# Patient Record
Sex: FEMALE | Race: WHITE | NOT HISPANIC OR LATINO | Employment: FULL TIME | URBAN - METROPOLITAN AREA
[De-identification: names, ages, dates, MRNs, and addresses within clinical notes are randomized per-mention and may not be internally consistent; named-entity substitution may affect disease eponyms.]

---

## 2017-07-19 ENCOUNTER — ALLSCRIPTS OFFICE VISIT (OUTPATIENT)
Dept: OTHER | Facility: OTHER | Age: 42
End: 2017-07-19

## 2017-07-19 DIAGNOSIS — Z12.31 ENCOUNTER FOR SCREENING MAMMOGRAM FOR MALIGNANT NEOPLASM OF BREAST: ICD-10-CM

## 2017-07-19 DIAGNOSIS — K21.9 GASTRO-ESOPHAGEAL REFLUX DISEASE WITHOUT ESOPHAGITIS: ICD-10-CM

## 2017-07-19 DIAGNOSIS — R53.83 OTHER FATIGUE: ICD-10-CM

## 2017-07-19 DIAGNOSIS — Z13.220 ENCOUNTER FOR SCREENING FOR LIPOID DISORDERS: ICD-10-CM

## 2017-07-19 DIAGNOSIS — Z00.00 ENCOUNTER FOR GENERAL ADULT MEDICAL EXAMINATION WITHOUT ABNORMAL FINDINGS: ICD-10-CM

## 2017-07-19 LAB
A/G RATIO (HISTORICAL): 1.9 (CALC) (ref 1–2.5)
ALBUMIN SERPL BCP-MCNC: 4.3 G/DL (ref 3.6–5.1)
ALP SERPL-CCNC: 54 U/L (ref 33–115)
ALT SERPL W P-5'-P-CCNC: 9 U/L (ref 6–29)
AST SERPL W P-5'-P-CCNC: 12 U/L (ref 10–30)
BASOPHILS # BLD AUTO: 1.3 %
BASOPHILS # BLD AUTO: 81 CELLS/UL (ref 0–200)
BILIRUB SERPL-MCNC: 0.6 MG/DL (ref 0.2–1.2)
BUN SERPL-MCNC: 13 MG/DL (ref 7–25)
BUN/CREA RATIO (HISTORICAL): NORMAL (CALC) (ref 6–22)
CALCIUM SERPL-MCNC: 9.8 MG/DL (ref 8.6–10.2)
CHLORIDE SERPL-SCNC: 104 MMOL/L (ref 98–110)
CHOLEST SERPL-MCNC: 226 MG/DL (ref 125–200)
CHOLEST/HDLC SERPL: 2.7 (CALC)
CO2 SERPL-SCNC: 27 MMOL/L (ref 20–31)
CREAT SERPL-MCNC: 0.89 MG/DL (ref 0.5–1.1)
DEPRECATED RDW RBC AUTO: 12 % (ref 11–15)
EGFR AFRICAN AMERICAN (HISTORICAL): 91 ML/MIN/1.73M2
EGFR-AMERICAN CALC (HISTORICAL): 79 ML/MIN/1.73M2
EOSINOPHIL # BLD AUTO: 353 CELLS/UL (ref 15–500)
EOSINOPHIL # BLD AUTO: 5.7 %
GAMMA GLOBULIN (HISTORICAL): 2.3 G/DL (CALC) (ref 1.9–3.7)
GLUCOSE (HISTORICAL): 83 MG/DL (ref 65–99)
HCT VFR BLD AUTO: 41 % (ref 35–45)
HDLC SERPL-MCNC: 85 MG/DL
HGB BLD-MCNC: 13.6 G/DL (ref 11.7–15.5)
LDL CHOLESTEROL (HISTORICAL): 120 MG/DL (CALC)
LYMPHOCYTES # BLD AUTO: 2375 CELLS/UL (ref 850–3900)
LYMPHOCYTES # BLD AUTO: 38.3 %
MCH RBC QN AUTO: 31.4 PG (ref 27–33)
MCHC RBC AUTO-ENTMCNC: 33.2 G/DL (ref 32–36)
MCV RBC AUTO: 94.7 FL (ref 80–100)
MONOCYTES # BLD AUTO: 440 CELLS/UL (ref 200–950)
MONOCYTES (HISTORICAL): 7.1 %
NEUTROPHILS # BLD AUTO: 2951 CELLS/UL (ref 1500–7800)
NEUTROPHILS # BLD AUTO: 47.6 %
NON-HDL-CHOL (CHOL-HDL) (HISTORICAL): 141 MG/DL (CALC)
PLATELET # BLD AUTO: 306 THOUSAND/UL (ref 140–400)
PMV BLD AUTO: 9.7 FL (ref 7.5–12.5)
POTASSIUM SERPL-SCNC: 4.4 MMOL/L (ref 3.5–5.3)
RBC # BLD AUTO: 4.33 MILLION/UL (ref 3.8–5.1)
SODIUM SERPL-SCNC: 139 MMOL/L (ref 135–146)
TOTAL PROTEIN (HISTORICAL): 6.6 G/DL (ref 6.1–8.1)
TRIGL SERPL-MCNC: 104 MG/DL
WBC # BLD AUTO: 6.2 THOUSAND/UL (ref 3.8–10.8)

## 2017-07-20 ENCOUNTER — LAB CONVERSION - ENCOUNTER (OUTPATIENT)
Dept: OTHER | Facility: OTHER | Age: 42
End: 2017-07-20

## 2017-07-20 LAB — TSH SERPL DL<=0.05 MIU/L-ACNC: 0.92 MIU/L

## 2017-08-01 ENCOUNTER — ALLSCRIPTS OFFICE VISIT (OUTPATIENT)
Dept: OTHER | Facility: OTHER | Age: 42
End: 2017-08-01

## 2017-08-09 ENCOUNTER — LAB CONVERSION - ENCOUNTER (OUTPATIENT)
Dept: OTHER | Facility: OTHER | Age: 42
End: 2017-08-09

## 2017-08-09 LAB
ADDITIONAL INFORMATION (HISTORICAL): NORMAL
ADEQUACY: (HISTORICAL): NORMAL
COMMENT (HISTORICAL): NORMAL
CYTOTECHNOLOGIST: (HISTORICAL): NORMAL
HPV MRNA E6/E7 (HISTORICAL): NOT DETECTED
INTERPRETATION (HISTORICAL): NORMAL
LMP (HISTORICAL): NORMAL
PREV. BX: (HISTORICAL): NORMAL
PREV. PAP (HISTORICAL): NORMAL
SOURCE (HISTORICAL): NORMAL

## 2017-10-04 ENCOUNTER — HOSPITAL ENCOUNTER (OUTPATIENT)
Dept: RADIOLOGY | Age: 42
Discharge: HOME | End: 2017-10-04
Payer: COMMERCIAL

## 2017-10-04 DIAGNOSIS — Z12.31 ENCOUNTER FOR SCREENING MAMMOGRAM FOR MALIGNANT NEOPLASM OF BREAST: ICD-10-CM

## 2017-10-04 PROCEDURE — G0202 SCR MAMMO BI INCL CAD: HCPCS

## 2017-10-04 PROCEDURE — 77063 BREAST TOMOSYNTHESIS BI: CPT

## 2018-01-12 VITALS
BODY MASS INDEX: 22.71 KG/M2 | DIASTOLIC BLOOD PRESSURE: 70 MMHG | WEIGHT: 133 LBS | HEIGHT: 64 IN | SYSTOLIC BLOOD PRESSURE: 110 MMHG

## 2018-01-16 NOTE — PROGRESS NOTES
Assessment    1  Gastroesophageal reflux disease (530 81) (K21 9)   2  Encounter to establish care (V65 8) (Z76 89)   3  Encounter for preventive health examination (V70 0) (Z00 00)    Plan   Fatigue, Gastroesophageal reflux disease    · (1) TSH WITH FT4 REFLEX; Status:Active; Requested for:77Kpp9117;   Gastroesophageal reflux disease    · Pantoprazole Sodium 40 MG Oral Tablet Delayed Release (Protonix); TAKE 1  TABLET DAILY  Health Maintenance    · Begin or continue regular aerobic exercise  Gradually work up to at least 3 sessions of 30  minutes of exercise a week ; Status:Complete;   Done: 19TNP9432   · Eat a low fat and low cholesterol diet ; Status:Complete;   Done: 32VWC7924    (1) LIPID PANEL, FASTING; Status:Resulted - Requires Verification;   Done: 45HSY6065 12:00AM  Due:96Bqx1954; Ordered;    For:Health Maintenance, Gastroesophageal reflux disease, Lipid screening; Ordered By:Sushma Martinez;   (1) COMPREHENSIVE METABOLIC PANEL; Status:Resulted - Requires Verification;   Done: 08SMT5708 12:00AM  Due:85Lsn9954; Ordered; For:Gastroesophageal reflux disease; Ordered By:Sushma Martinez;   (1) CBC/PLT/DIFF; Status:Resulted - Requires Verification;   Done: 05KTR1160 12:00AM  Due:23Mih5877; Ordered; For:Gastroesophageal reflux disease; Ordered By:Sushma Martinez;      Discussion/Summary  health maintenance visit Currently, she eats a healthy diet and has an adequate exercise regimen  cervical cancer screening is current Breast cancer screening: mammogram is current  Colorectal cancer screening: colorectal cancer screening is not indicated  Osteoporosis screening: bone mineral density testing is not indicated  Screening lab work includes hemoglobin, glucose, lipid profile and thyroid function testing  The TdaP 2013 with last pregnancy, due 2023 Td  Advice and education were given regarding nutrition, aerobic exercise and weight bearing exercise  Patient discussion: discussed with the patient       HM see above  GERD: d/c otc prilosec prn use and trial pantoprazole 40 mg po q day for 8-12 weeks then plan to wean  Education: reflux, DDX, diet, TLC and meds  Follow up 6-12 weeks  Possible side effects of new medications were reviewed with the patient/guardian today  The treatment plan was reviewed with the patient/guardian  The patient/guardian understands and agrees with the treatment plan      Chief Complaint  here to establish PCP and HM physical      History of Present Illness  HM, Adult Female: The patient is being seen for a health maintenance evaluation  The last health maintenance visit was >2 year(s) ago  Social History: Household members include spouse, 1 daughter(s) and 2 son(s)  She is   Work status: working part-time and [employer] [facility] [address]  The patient is a former cigarette smoker  She quit age 25  She reports drinking 7 drinks per week  The patient has no concerns about alcohol abuse  She has never used illicit drugs  General Health:   Reproductive health: the patient is premenopausal   she reports abnormal menses  Menstrual history: Menstrual Problems: dysmenorrhea, primary  she is sexually active  pregnancy history: G 3P 3, 3  Screening: Cervical cancer screening includes a pap smear performed 2016  Breast cancer screening includes a mammogram performed 2016  She hasn't been previously screened for colorectal cancer  HPI: HM see above  c/o reflux for past month, occurring several times per/week  OFten occurs after breakfast with symptoms of fullness gastric and throat symptoms  Using otc prilosec with improvement  non smoker  Denies wt loss/abdominal pain, change in stool/bloody stool, difficulty swallowing  Review of Systems    Constitutional: no fever, not feeling poorly, no chills and not feeling tired  Eyes: no eye pain, no eyesight problems, eyes not red and no purulent discharge from the eyes  ENT: no hearing loss and no nasal discharge     Cardiovascular: no chest pain, no intermittent leg claudication, no palpitations and no lower extremity edema  Respiratory: no shortness of breath, no cough and no wheezing  Gastrointestinal: no abdominal pain, no nausea, no constipation and no diarrhea  Musculoskeletal: no arthralgias, no limb pain and no myalgias  Integumentary: no rashes and no itching  Neurological: no headache, no numbness, no tingling, no dizziness and no fainting  Psychiatric: no anxiety, no sleep disturbances and no depression  Endocrine: no muscle weakness  Hematologic/Lymphatic: no swollen glands and no tendency for easy bleeding  Active Problems    1  Contraception (V25 9) (Z30 9)   2  Encounter to establish care (V65 8) (Z76 89)   3  Fatigue (780 79) (R53 83)   4  Gastroesophageal reflux disease (530 81) (K21 9)   5  Lipid screening (V77 91) (Z13 220)    Past Medical History    · History of Screening for colon cancer (V76 51) (Z12 11)    Surgical History    · History of Cervix Cryosurgery   · History of Cholecystectomy   · History of Oral Surgery Tooth Extraction [facility]    Family History  Mother    · Family history of malignant neoplasm (V16 9) (Z80 9)   · Family history of neuropathy (V17 2) (Z82 0)   · Family history of seizure disorder (V17 2) (Z82 0)  Father    · Family history of atrial fibrillation (V17 49) (Z82 49)   · Family history of cerebrovascular accident (CVA) (V17 1) (Z82 3)  Sister    · Family history of thyroid disease (V18 19) (Z83 49)    Social History    · Does not use illicit drugs (G91 90) (Z78 9)   · Employed   · Former smoker (V15 82) (Y65 839)   · Social alcohol use (Z78 9)    Current Meds   1  Tri-Sprintec 0 18/0 215/0 25 MG-35 MCG Oral Tablet; Take 1 tablet daily; Therapy: 19Apr2017 to (Last Rx:19Apr2017)  Requested for: 19Apr2017 Ordered    Allergies    1   No Known Drug Allergies    Vitals   Recorded: 59VZU0581 10:15AM   Temperature 98 5 F   Heart Rate 76   Respiration 18   Systolic 227 Diastolic 80   Height 5 ft 3 5 in   Weight 131 lb    BMI Calculated 22 84   BSA Calculated 1 62     Physical Exam    Constitutional   General appearance: No acute distress, well appearing and well nourished  Head and Face   Head and face: Normal     Palpation of the face and sinuses: No sinus tenderness  Eyes   Conjunctiva and lids: No swelling, erythema or discharge  Pupils and irises: Equal, round, reactive to light  Ears, Nose, Mouth, and Throat   External inspection of ears and nose: Normal     Otoscopic examination: Tympanic membranes translucent with normal light reflex  Canals patent without erythema  Hearing: Normal     Nasal mucosa, septum, and turbinates: Normal without edema or erythema  Lips, teeth, and gums: Normal, good dentition  Oropharynx: Normal with no erythema, edema, exudate or lesions  Neck   Neck: Supple, symmetric, trachea midline, no masses  Thyroid: Normal, no thyromegaly  Pulmonary   Respiratory effort: No increased work of breathing or signs of respiratory distress  Auscultation of lungs: Clear to auscultation  Cardiovascular   Palpation of heart: Normal PMI, no thrills  Auscultation of heart: Normal rate and rhythm, normal S1 and S2, no murmurs  Pedal pulses: 2+ bilaterally  Peripheral vascular exam: Normal     Examination of extremities for edema and/or varicosities: Normal     Abdomen   Abdomen: Non-tender, no masses  Liver and spleen: No hepatomegaly or splenomegaly  Lymphatic   Palpation of lymph nodes in neck: No lymphadenopathy  Musculoskeletal   Gait and station: Normal     Digits and nails: Normal without clubbing or cyanosis  Joints, bones, and muscles: Normal     Range of motion: Normal     Stability: Normal     Muscle strength/tone: Normal     Skin   Skin and subcutaneous tissue: Normal without rashes or lesions  Neurologic   Cortical function: Normal mental status  Reflexes: 2+ and symmetric      Sensation: No sensory loss  Psychiatric   Orientation to person, place, and time: Normal     Mood and affect: Normal        Results/Data  PHQ-2 Adult Depression Screening 20LAU8199 02:14PM User, Ahs     Test Name Result Flag Reference   PHQ-2 Adult Depression Score 0     Over the last two weeks, how often have you been bothered by any of the following problems? Little interest or pleasure in doing things: Not at all - 0  Feeling down, depressed, or hopeless: Not at all - 0   PHQ-2 Adult Depression Screening Negative         Attending Note  Collaborating Physician Note: Collaborating Physician: I discussed the case with the Advanced Practitioner and reviewed the note, I supervised the Advanced Practitioner and I agree with the Advanced Practitioner note        Future Appointments    Date/Time Provider Specialty Site   08/01/2017 02:30 PM Zach Crespo DO Obstetrics/Gynecology [facility]     Signatures   Electronically signed by : Meenu Britton ; Jul 23 2017  5:34PM EST                       (Author)    Electronically signed by : ISMA Walker ; Jul 23 2017  7:59PM EST                       (Author)

## 2018-01-22 VITALS
RESPIRATION RATE: 18 BRPM | DIASTOLIC BLOOD PRESSURE: 80 MMHG | BODY MASS INDEX: 22.36 KG/M2 | WEIGHT: 131 LBS | HEIGHT: 64 IN | HEART RATE: 76 BPM | TEMPERATURE: 98.5 F | SYSTOLIC BLOOD PRESSURE: 110 MMHG

## 2018-08-08 ENCOUNTER — ANNUAL EXAM (OUTPATIENT)
Dept: OBGYN CLINIC | Facility: CLINIC | Age: 43
End: 2018-08-08
Payer: COMMERCIAL

## 2018-08-08 ENCOUNTER — TELEPHONE (OUTPATIENT)
Dept: OBGYN CLINIC | Facility: CLINIC | Age: 43
End: 2018-08-08

## 2018-08-08 VITALS
BODY MASS INDEX: 23.25 KG/M2 | HEIGHT: 64 IN | WEIGHT: 136.2 LBS | DIASTOLIC BLOOD PRESSURE: 76 MMHG | SYSTOLIC BLOOD PRESSURE: 114 MMHG

## 2018-08-08 DIAGNOSIS — Z12.39 BREAST CANCER SCREENING: ICD-10-CM

## 2018-08-08 DIAGNOSIS — Z30.41 ENCOUNTER FOR SURVEILLANCE OF CONTRACEPTIVE PILLS: ICD-10-CM

## 2018-08-08 DIAGNOSIS — Z01.419 ENCOUNTER FOR ANNUAL ROUTINE GYNECOLOGICAL EXAMINATION: Primary | ICD-10-CM

## 2018-08-08 PROCEDURE — S0612 ANNUAL GYNECOLOGICAL EXAMINA: HCPCS | Performed by: OBSTETRICS & GYNECOLOGY

## 2018-08-08 RX ORDER — NORGESTIMATE AND ETHINYL ESTRADIOL 7DAYSX3 28
1 KIT ORAL DAILY
Qty: 28 TABLET | Refills: 11 | Status: SHIPPED | OUTPATIENT
Start: 2018-08-08 | End: 2019-07-10 | Stop reason: SDUPTHER

## 2018-08-08 RX ORDER — TRIAMCINOLONE ACETONIDE 1 MG/G
CREAM TOPICAL 2 TIMES DAILY
Qty: 30 G | Refills: 0 | Status: SHIPPED | OUTPATIENT
Start: 2018-08-08 | End: 2018-08-08 | Stop reason: CLARIF

## 2018-08-08 RX ORDER — NORGESTIMATE AND ETHINYL ESTRADIOL 7DAYSX3 28
KIT ORAL
COMMUNITY
Start: 2018-07-11 | End: 2018-08-08 | Stop reason: CLARIF

## 2018-08-08 NOTE — PROGRESS NOTES
Assessment/Plan:    Pap smear deferred due to low risk status  Encouraged self-breast examination as well as calcium supplementation  Continue annual mammogram, discussed 3D mammography  She will check see if this is covered by her insurance  Continue Ortho-Tri-Cyclen x1 year  Return to office in 1 year  She is instructed to keep a menstrual diary and call if her cycles are less than 21 day cycle  No problem-specific Assessment & Plan notes found for this encounter  Diagnoses and all orders for this visit:    Encounter for annual routine gynecological examination    Breast cancer screening    Encounter for surveillance of contraceptive pills  -     norgestimate-ethinyl estradiol (ORTHO TRI-CYCLEN,TRINESSA) 0 18/0 215/0 25 MG-35 MCG per tablet; Take 1 tablet by mouth daily    Other orders  -     Cancel: Mammo screening bilateral w 3d & cad; Future  -     Discontinue: TRI-LINYAH 0 18/0 215/0 25 MG-35 MCG per tablet;   -     Discontinue: triamcinolone (KENALOG) 0 1 % cream; Apply topically 2 (two) times a day          Subjective:      Patient ID: Merle Jerez is a 37 y o  female  HPI     This is a 22-year-old female  ( x3, age 21, 10, 4) presents for her annual gyn exam   She states her menstrual cycles are fairly regular every 4 weeks lasting 3-4 days  She does state that she had breakthrough bleeding on her last cycle  She denies any hot flashes or night sweats  She does diet exercise regularly however has noticed a 5 lb weight gain in the course of the year  She denies any changes in bowel or bladder function  She is sexually active and has been in a monogamous relationship for over 9 years  All her Pap smears have been normal   Last Pap smear 2017 within normal limits      The following portions of the patient's history were reviewed and updated as appropriate: allergies, current medications, past family history, past medical history, past social history, past surgical history and problem list     Review of Systems   Constitutional: Negative for fatigue, fever and unexpected weight change  Respiratory: Negative for cough, chest tightness, shortness of breath and wheezing  Cardiovascular: Negative  Negative for chest pain and palpitations  Gastrointestinal: Negative  Negative for abdominal distention, abdominal pain, blood in stool, constipation, diarrhea, nausea and vomiting  Genitourinary: Negative  Negative for difficulty urinating, dyspareunia, dysuria, flank pain, frequency, genital sores, hematuria, pelvic pain, urgency, vaginal bleeding, vaginal discharge and vaginal pain  Skin: Negative for rash  Objective:      /76   Ht 5' 3 5" (1 613 m)   Wt 61 8 kg (136 lb 3 2 oz)   LMP 07/08/2018 (Approximate)   Breastfeeding? No   BMI 23 75 kg/m²          Physical Exam   Constitutional: She appears well-developed and well-nourished  Cardiovascular: Normal rate and regular rhythm  Pulmonary/Chest: Effort normal and breath sounds normal  Right breast exhibits no inverted nipple, no mass, no nipple discharge, no skin change and no tenderness  Left breast exhibits no inverted nipple, no mass, no nipple discharge, no skin change and no tenderness  Abdominal: Soft  Bowel sounds are normal  She exhibits no distension  There is no tenderness  There is no rebound and no guarding  Genitourinary: Vagina normal and uterus normal  There is no lesion on the right labia  There is no lesion on the left labia  Cervix exhibits no discharge and no friability  Right adnexum displays no mass, no tenderness and no fullness  Left adnexum displays no mass, no tenderness and no fullness  No vaginal discharge found

## 2018-10-10 ENCOUNTER — HOSPITAL ENCOUNTER (OUTPATIENT)
Dept: RADIOLOGY | Age: 43
Discharge: HOME | End: 2018-10-10
Payer: COMMERCIAL

## 2018-10-10 ENCOUNTER — TRANSCRIBE ORDERS (OUTPATIENT)
Dept: ADMINISTRATIVE | Age: 43
End: 2018-10-10

## 2018-10-10 DIAGNOSIS — Z12.39 SCREENING FOR BREAST CANCER: Primary | ICD-10-CM

## 2018-10-10 DIAGNOSIS — Z12.39 SCREENING FOR BREAST CANCER: ICD-10-CM

## 2018-10-10 PROCEDURE — 77067 SCR MAMMO BI INCL CAD: CPT

## 2018-10-10 PROCEDURE — 77063 BREAST TOMOSYNTHESIS BI: CPT

## 2019-07-10 DIAGNOSIS — Z30.41 ENCOUNTER FOR SURVEILLANCE OF CONTRACEPTIVE PILLS: ICD-10-CM

## 2019-07-10 RX ORDER — NORGESTIMATE AND ETHINYL ESTRADIOL 7DAYSX3 28
1 KIT ORAL DAILY
Qty: 28 TABLET | Refills: 1 | Status: SHIPPED | OUTPATIENT
Start: 2019-07-10 | End: 2019-08-20 | Stop reason: SDUPTHER

## 2019-07-10 NOTE — TELEPHONE ENCOUNTER
rec'd rf req for ortho triCyclen from [facility]) - yearly due 8/2019 - called pt & sched yearly appt    Please sign off on presc to [facility])

## 2019-08-20 ENCOUNTER — ANNUAL EXAM (OUTPATIENT)
Dept: OBGYN CLINIC | Facility: CLINIC | Age: 44
End: 2019-08-20
Payer: COMMERCIAL

## 2019-08-20 VITALS
BODY MASS INDEX: 23.22 KG/M2 | HEIGHT: 64 IN | DIASTOLIC BLOOD PRESSURE: 62 MMHG | SYSTOLIC BLOOD PRESSURE: 114 MMHG | WEIGHT: 136 LBS

## 2019-08-20 DIAGNOSIS — Z30.41 ENCOUNTER FOR SURVEILLANCE OF CONTRACEPTIVE PILLS: ICD-10-CM

## 2019-08-20 DIAGNOSIS — Z01.419 ENCOUNTER FOR ANNUAL ROUTINE GYNECOLOGICAL EXAMINATION: Primary | ICD-10-CM

## 2019-08-20 DIAGNOSIS — Z12.39 BREAST CANCER SCREENING: ICD-10-CM

## 2019-08-20 PROCEDURE — S0612 ANNUAL GYNECOLOGICAL EXAMINA: HCPCS | Performed by: OBSTETRICS & GYNECOLOGY

## 2019-08-20 RX ORDER — NORGESTIMATE AND ETHINYL ESTRADIOL 7DAYSX3 28
1 KIT ORAL DAILY
Qty: 28 TABLET | Refills: 11 | Status: SHIPPED | OUTPATIENT
Start: 2019-08-20 | End: 2020-08-11 | Stop reason: SDUPTHER

## 2019-08-20 NOTE — PROGRESS NOTES
Assessment/Plan:  Pap smear done as well as annual   Encouraged self-breast examination as well as calcium supplementation  Continue annual mammogram   Continue Ortho-Tri-Cyclen x1 year  Return to office in 1 year or p r n  No problem-specific Assessment & Plan notes found for this encounter  Diagnoses and all orders for this visit:    Encounter for annual routine gynecological examination  -     Thinprep Tis and HPV mRNA E6/E7    Breast cancer screening  -     Mammo screening bilateral w 3d & cad; Future    Encounter for surveillance of contraceptive pills  -     norgestimate-ethinyl estradiol (ORTHO TRI-CYCLEN,TRINESSA) 0 18/0 215/0 25 MG-35 MCG per tablet; Take 1 tablet by mouth daily          Subjective:      Patient ID: Lior Johansen is a 53 y o  female  HPI     This is a 64-year-old female  ( x3, age 19, 10, 3) presents for her annual gyn exam   Patient offers no complaints today  Her menstrual cycles are regular every 4 weeks lasting 4-5 days with no breakthrough bleeding  She denies any changes in bowel or bladder function  Patient has been in a monogamous relationship for over 10 years  Her Pap smears have been normal     The following portions of the patient's history were reviewed and updated as appropriate: allergies, current medications, past family history, past medical history, past social history, past surgical history and problem list     Review of Systems   Constitutional: Negative for fatigue, fever and unexpected weight change  Respiratory: Negative for cough, chest tightness, shortness of breath and wheezing  Cardiovascular: Negative  Negative for chest pain and palpitations  Gastrointestinal: Negative  Negative for abdominal distention, abdominal pain, blood in stool, constipation, diarrhea, nausea and vomiting  Genitourinary: Negative    Negative for difficulty urinating, dyspareunia, dysuria, flank pain, frequency, genital sores, hematuria, pelvic pain, urgency, vaginal bleeding, vaginal discharge and vaginal pain  Skin: Negative for rash  Objective:      /62   Ht 5' 3 5" (1 613 m)   Wt 61 7 kg (136 lb)   LMP 08/08/2019 (Exact Date)   Breastfeeding? No   BMI 23 71 kg/m²          Physical Exam   Constitutional: She appears well-developed and well-nourished  Cardiovascular: Normal rate and regular rhythm  Pulmonary/Chest: Effort normal and breath sounds normal  Right breast exhibits no inverted nipple, no mass, no nipple discharge, no skin change and no tenderness  Left breast exhibits no inverted nipple, no mass, no nipple discharge, no skin change and no tenderness  Abdominal: Soft  Bowel sounds are normal  She exhibits no distension  There is no tenderness  There is no rebound and no guarding  Genitourinary: Vagina normal and uterus normal  There is no lesion on the right labia  There is no lesion on the left labia  Cervix exhibits no discharge and no friability  Right adnexum displays no mass, no tenderness and no fullness  Left adnexum displays no mass, no tenderness and no fullness  No vaginal discharge found

## 2019-08-25 LAB
CLINICAL INFO: NORMAL
CYTO CVX: NORMAL
CYTOLOGY CMNT CVX/VAG CYTO-IMP: NORMAL
DATE PREVIOUS BX: NORMAL
HPV E6+E7 MRNA CVX QL NAA+PROBE: NOT DETECTED
LMP START DATE: NORMAL
SL AMB PREV. PAP:: NORMAL
SPECIMEN SOURCE CVX/VAG CYTO: NORMAL

## 2019-10-16 ENCOUNTER — HOSPITAL ENCOUNTER (OUTPATIENT)
Dept: RADIOLOGY | Age: 44
Discharge: HOME | End: 2019-10-16
Payer: COMMERCIAL

## 2019-10-16 VITALS — BODY MASS INDEX: 23.22 KG/M2 | HEIGHT: 64 IN | WEIGHT: 136 LBS

## 2019-10-16 DIAGNOSIS — Z12.31 ENCOUNTER FOR SCREENING MAMMOGRAM FOR MALIGNANT NEOPLASM OF BREAST: ICD-10-CM

## 2019-10-16 DIAGNOSIS — Z12.39 BREAST CANCER SCREENING: ICD-10-CM

## 2019-10-16 PROCEDURE — 77067 SCR MAMMO BI INCL CAD: CPT

## 2019-10-16 PROCEDURE — 77063 BREAST TOMOSYNTHESIS BI: CPT

## 2020-02-13 ENCOUNTER — OFFICE VISIT (OUTPATIENT)
Dept: FAMILY MEDICINE CLINIC | Facility: CLINIC | Age: 45
End: 2020-02-13

## 2020-02-13 VITALS
TEMPERATURE: 98 F | HEART RATE: 76 BPM | BODY MASS INDEX: 24.59 KG/M2 | RESPIRATION RATE: 16 BRPM | SYSTOLIC BLOOD PRESSURE: 120 MMHG | DIASTOLIC BLOOD PRESSURE: 70 MMHG | WEIGHT: 144 LBS | HEIGHT: 64 IN

## 2020-02-13 DIAGNOSIS — H10.33 ACUTE BACTERIAL CONJUNCTIVITIS OF BOTH EYES: ICD-10-CM

## 2020-02-13 DIAGNOSIS — J01.10 ACUTE NON-RECURRENT FRONTAL SINUSITIS: Primary | ICD-10-CM

## 2020-02-13 PROCEDURE — 99213 OFFICE O/P EST LOW 20 MIN: CPT | Performed by: FAMILY MEDICINE

## 2020-02-13 PROCEDURE — 1036F TOBACCO NON-USER: CPT | Performed by: FAMILY MEDICINE

## 2020-02-13 PROCEDURE — 3008F BODY MASS INDEX DOCD: CPT | Performed by: FAMILY MEDICINE

## 2020-02-13 RX ORDER — TOBRAMYCIN 3 MG/ML
1 SOLUTION/ DROPS OPHTHALMIC
Qty: 5 ML | Refills: 0 | Status: SHIPPED | OUTPATIENT
Start: 2020-02-13 | End: 2021-01-28

## 2020-02-13 RX ORDER — AMOXICILLIN 500 MG/1
500 TABLET, FILM COATED ORAL 2 TIMES DAILY
Qty: 14 TABLET | Refills: 0 | Status: SHIPPED | OUTPATIENT
Start: 2020-02-13 | End: 2020-02-20

## 2020-02-13 NOTE — ASSESSMENT & PLAN NOTE
In the past 2 days she started having conjunctivitis in her eyes with pus and crusting, after her daughter had been diagnosed with bacterial conjunctivitis a couple days prior  On physical exam she did have bilateral conjunctivitis  Will prescribe tobramycin eye drops

## 2020-02-13 NOTE — PROGRESS NOTES
Assessment/Plan:    Acute non-recurrent frontal sinusitis  Patient has been experiencing sinus pain/pressure, chills, chest congestion, cough, and sore throat that has not improved at all for over a week  On physical exam she has wwollen lymph nodes in her neck and sinus tenderness  Since a week has passed and she has not had any improvement in her symptoms, there is reasonable concern for bacterial infection  Will prescribe amoxicillin for 7 days  She may supplement with DayQuil/NyQuil as needed  Also discussed some ways to relieve sinus pressure  Acute bacterial conjunctivitis of both eyes   In the past 2 days she started having conjunctivitis in her eyes with pus and crusting, after her daughter had been diagnosed with bacterial conjunctivitis a couple days prior  On physical exam she did have bilateral conjunctivitis  Will prescribe tobramycin eye drops  Diagnoses and all orders for this visit:    Acute non-recurrent frontal sinusitis  -     amoxicillin (AMOXIL) 500 MG tablet; Take 1 tablet (500 mg total) by mouth 2 (two) times a day for 7 days    Acute bacterial conjunctivitis of both eyes  -     tobramycin (TOBREX) 0 3 % SOLN; Administer 1 drop to both eyes every 4 (four) hours while awake          Subjective:      Patient ID: Shay Huynh is a 53 y o  female  Patient had a URI about 3 weeks ago and she said it cleaned up in a week, however 2 weeks later she got sick again  Last Wednesday night she got a sore throat and felt achy  She is also having sinus pressure, chest congestion, cough, and chills  No fevers  Her left eye felt like it was "pinching" 2 days ago, but it was not painful like when she had pink eye in the past   Today her right eye became affected and had pus this morning  She notes her daughter had a cold and bacterial conjunctivitis which was diagnosed last Monday and she was given Tobramycin Dexamethophth eye drops   She tried to use her daughter's eye drops yesterday in her left eye  Her throat has been sore non-stop for the past week  She hasn't been able to sleep well for the past week  Yesterday she took Advil and it did not help her sore throat at all  She has never gotten a flu shot  No recent travel  Review of Systems   Constitutional: Positive for chills and fatigue  Negative for activity change, appetite change, fever and unexpected weight change  HENT: Positive for congestion, ear pain, postnasal drip, rhinorrhea, sinus pressure, sinus pain and sore throat  Negative for ear discharge and sneezing  Eyes: Positive for pain, discharge and redness  Negative for itching and visual disturbance  Pus and crusting, eye "pinching" and soreness   Respiratory: Positive for cough and chest tightness  Cardiovascular: Positive for palpitations  Negative for chest pain  Gastrointestinal: Negative for abdominal pain, blood in stool, constipation, diarrhea, nausea and vomiting  Genitourinary: Negative for difficulty urinating, dysuria and urgency  Musculoskeletal: Negative for arthralgias, back pain, joint swelling, myalgias and neck pain  Skin: Negative for rash  Allergic/Immunologic: Negative for environmental allergies and food allergies  Neurological: Positive for light-headedness and headaches  Negative for dizziness, syncope and weakness  Psychiatric/Behavioral: Positive for sleep disturbance  Objective:      /70   Pulse 76   Temp 98 °F (36 7 °C)   Resp 16   Ht 5' 3 5" (1 613 m)   Wt 65 3 kg (144 lb)   BMI 25 11 kg/m²          Physical Exam   Constitutional: She is oriented to person, place, and time  She appears well-developed and well-nourished  No distress  HENT:   Head: Normocephalic and atraumatic  Right Ear: Tympanic membrane, external ear and ear canal normal  No drainage  Tympanic membrane is not bulging  Left Ear: Tympanic membrane, external ear and ear canal normal  No drainage  Tympanic membrane is not bulging     Nose: Right sinus exhibits maxillary sinus tenderness and frontal sinus tenderness  Left sinus exhibits maxillary sinus tenderness and frontal sinus tenderness  Mouth/Throat: No oropharyngeal exudate  Ulcers in the back of the mouth   Eyes: Pupils are equal, round, and reactive to light  Right eye exhibits no discharge  Left eye exhibits no discharge  Right conjunctiva is injected  Left conjunctiva is injected  Neck: Normal range of motion  Neck supple  Cardiovascular: Normal rate, regular rhythm and normal heart sounds  Pulmonary/Chest: Effort normal and breath sounds normal    Abdominal: Soft  Bowel sounds are normal  She exhibits no distension  There is no tenderness  Lymphadenopathy:     She has cervical adenopathy  Neurological: She is alert and oriented to person, place, and time  Skin: Skin is warm and dry  No rash noted  Psychiatric: She has a normal mood and affect  Her behavior is normal  Judgment and thought content normal    Nursing note and vitals reviewed

## 2020-02-13 NOTE — ASSESSMENT & PLAN NOTE
Patient has been experiencing sinus pain/pressure, chills, chest congestion, cough, and sore throat that has not improved at all for over a week  On physical exam she has wwollen lymph nodes in her neck and sinus tenderness  Since a week has passed and she has not had any improvement in her symptoms, there is reasonable concern for bacterial infection  Will prescribe amoxicillin for 7 days  She may supplement with DayQuil/NyQuil as needed  Also discussed some ways to relieve sinus pressure

## 2020-05-26 ENCOUNTER — TELEPHONE (OUTPATIENT)
Dept: FAMILY MEDICINE CLINIC | Facility: CLINIC | Age: 45
End: 2020-05-26

## 2020-05-27 ENCOUNTER — TELEMEDICINE (OUTPATIENT)
Dept: FAMILY MEDICINE CLINIC | Facility: CLINIC | Age: 45
End: 2020-05-27

## 2020-05-27 DIAGNOSIS — Z20.828 EXPOSURE TO SARS-ASSOCIATED CORONAVIRUS: Primary | ICD-10-CM

## 2020-05-27 PROCEDURE — 99214 OFFICE O/P EST MOD 30 MIN: CPT | Performed by: FAMILY MEDICINE

## 2020-05-28 ENCOUNTER — APPOINTMENT (OUTPATIENT)
Dept: LAB | Age: 45
End: 2020-05-28
Payer: COMMERCIAL

## 2020-05-28 DIAGNOSIS — Z20.828 EXPOSURE TO SARS-ASSOCIATED CORONAVIRUS: ICD-10-CM

## 2020-05-28 PROCEDURE — 86769 SARS-COV-2 COVID-19 ANTIBODY: CPT

## 2020-05-28 PROCEDURE — 36415 COLL VENOUS BLD VENIPUNCTURE: CPT

## 2020-05-30 LAB — SARS-COV-2 IGG SERPL QL IA: NEGATIVE

## 2020-08-11 DIAGNOSIS — Z30.41 ENCOUNTER FOR SURVEILLANCE OF CONTRACEPTIVE PILLS: ICD-10-CM

## 2020-08-11 RX ORDER — NORGESTIMATE AND ETHINYL ESTRADIOL 7DAYSX3 28
1 KIT ORAL DAILY
Qty: 28 TABLET | Refills: 2 | Status: SHIPPED | OUTPATIENT
Start: 2020-08-11 | End: 2021-01-12 | Stop reason: HOSPADM

## 2020-10-08 ENCOUNTER — ANNUAL EXAM (OUTPATIENT)
Dept: OBGYN CLINIC | Facility: CLINIC | Age: 45
End: 2020-10-08
Payer: COMMERCIAL

## 2020-10-08 VITALS
HEIGHT: 63 IN | WEIGHT: 136 LBS | BODY MASS INDEX: 24.1 KG/M2 | DIASTOLIC BLOOD PRESSURE: 78 MMHG | TEMPERATURE: 97 F | SYSTOLIC BLOOD PRESSURE: 122 MMHG

## 2020-10-08 DIAGNOSIS — N92.6 IRREGULAR MENSES: ICD-10-CM

## 2020-10-08 DIAGNOSIS — Z12.31 ENCOUNTER FOR SCREENING MAMMOGRAM FOR MALIGNANT NEOPLASM OF BREAST: ICD-10-CM

## 2020-10-08 DIAGNOSIS — Z01.419 ENCOUNTER FOR ANNUAL ROUTINE GYNECOLOGICAL EXAMINATION: Primary | ICD-10-CM

## 2020-10-08 PROCEDURE — S0612 ANNUAL GYNECOLOGICAL EXAMINA: HCPCS | Performed by: OBSTETRICS & GYNECOLOGY

## 2020-10-08 PROCEDURE — 87624 HPV HI-RISK TYP POOLED RSLT: CPT | Performed by: OBSTETRICS & GYNECOLOGY

## 2020-10-08 PROCEDURE — G0124 SCREEN C/V THIN LAYER BY MD: HCPCS | Performed by: PATHOLOGY

## 2020-10-08 PROCEDURE — G0145 SCR C/V CYTO,THINLAYER,RESCR: HCPCS | Performed by: PATHOLOGY

## 2020-10-12 LAB
HPV HR 12 DNA CVX QL NAA+PROBE: NEGATIVE
HPV16 DNA CVX QL NAA+PROBE: NEGATIVE
HPV18 DNA CVX QL NAA+PROBE: NEGATIVE

## 2020-10-14 ENCOUNTER — HOSPITAL ENCOUNTER (OUTPATIENT)
Dept: RADIOLOGY | Facility: HOSPITAL | Age: 45
Discharge: HOME | End: 2020-10-14
Attending: OBSTETRICS & GYNECOLOGY
Payer: COMMERCIAL

## 2020-10-14 DIAGNOSIS — N92.6 IRREGULAR MENSES: ICD-10-CM

## 2020-10-14 PROCEDURE — 76830 TRANSVAGINAL US NON-OB: CPT

## 2020-10-14 PROCEDURE — 76856 US EXAM PELVIC COMPLETE: CPT

## 2020-10-16 LAB
LAB AP GYN PRIMARY INTERPRETATION: ABNORMAL
Lab: ABNORMAL
PATH INTERP SPEC-IMP: ABNORMAL

## 2020-10-19 ENCOUNTER — TELEPHONE (OUTPATIENT)
Dept: OBGYN CLINIC | Facility: CLINIC | Age: 45
End: 2020-10-19

## 2020-10-19 DIAGNOSIS — B96.89 BV (BACTERIAL VAGINOSIS): Primary | ICD-10-CM

## 2020-10-19 DIAGNOSIS — N83.201 CYSTS OF BOTH OVARIES: ICD-10-CM

## 2020-10-19 DIAGNOSIS — N76.0 BV (BACTERIAL VAGINOSIS): Primary | ICD-10-CM

## 2020-10-19 DIAGNOSIS — N83.202 CYSTS OF BOTH OVARIES: ICD-10-CM

## 2020-10-20 RX ORDER — METRONIDAZOLE 500 MG/1
500 TABLET ORAL EVERY 12 HOURS SCHEDULED
Qty: 14 TABLET | Refills: 0 | Status: SHIPPED | OUTPATIENT
Start: 2020-10-20 | End: 2020-10-27

## 2020-10-21 ENCOUNTER — HOSPITAL ENCOUNTER (OUTPATIENT)
Dept: RADIOLOGY | Age: 45
Discharge: HOME | End: 2020-10-21
Payer: COMMERCIAL

## 2020-10-21 VITALS — BODY MASS INDEX: 23.74 KG/M2 | HEIGHT: 63 IN | WEIGHT: 134 LBS

## 2020-10-21 DIAGNOSIS — Z12.31 ENCOUNTER FOR SCREENING MAMMOGRAM FOR MALIGNANT NEOPLASM OF BREAST: ICD-10-CM

## 2020-10-21 PROCEDURE — 77067 SCR MAMMO BI INCL CAD: CPT

## 2020-10-21 PROCEDURE — 77063 BREAST TOMOSYNTHESIS BI: CPT

## 2020-12-21 ENCOUNTER — TRANSCRIBE ORDERS (OUTPATIENT)
Dept: RADIOLOGY | Facility: HOSPITAL | Age: 45
End: 2020-12-21

## 2020-12-21 ENCOUNTER — HOSPITAL ENCOUNTER (OUTPATIENT)
Dept: RADIOLOGY | Facility: HOSPITAL | Age: 45
Discharge: HOME | End: 2020-12-21
Attending: OBSTETRICS & GYNECOLOGY
Payer: COMMERCIAL

## 2020-12-21 DIAGNOSIS — N83.201 CYSTS OF BOTH OVARIES: ICD-10-CM

## 2020-12-21 DIAGNOSIS — N83.202 CYSTS OF BOTH OVARIES: ICD-10-CM

## 2020-12-21 PROCEDURE — 76830 TRANSVAGINAL US NON-OB: CPT

## 2020-12-21 PROCEDURE — 76856 US EXAM PELVIC COMPLETE: CPT

## 2020-12-23 ENCOUNTER — TELEPHONE (OUTPATIENT)
Dept: OBGYN CLINIC | Facility: CLINIC | Age: 45
End: 2020-12-23

## 2020-12-23 DIAGNOSIS — N83.201 CYST OF RIGHT OVARY: Primary | ICD-10-CM

## 2021-01-05 LAB — CANCER AG125 SERPL-ACNC: 13 U/ML

## 2021-01-08 ENCOUNTER — TELEPHONE (OUTPATIENT)
Dept: OBGYN CLINIC | Facility: CLINIC | Age: 46
End: 2021-01-08

## 2021-01-08 NOTE — TELEPHONE ENCOUNTER
Pt calling for pelvic MRI results done 1/7/2021 - results in chart &  = 13 (1/4/2021)  Pt informed  wnl  Please review MRI results & informed pt would recall her on 1/11/2021

## 2021-01-11 ENCOUNTER — TELEPHONE (OUTPATIENT)
Dept: HEMATOLOGY ONCOLOGY | Facility: CLINIC | Age: 46
End: 2021-01-11

## 2021-01-11 DIAGNOSIS — D49.59 OVARIAN NEOPLASM: Primary | ICD-10-CM

## 2021-01-11 NOTE — TELEPHONE ENCOUNTER
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

## 2021-01-11 NOTE — TELEPHONE ENCOUNTER
New Patient Encounter    New Patient Intake Form   Patient Details: Milady Quezada  1975  [id number]    Background Information:  [address] starts by opening a telephone encounter and gathering the following information   Who is calling to schedule? If not self, relationship to patient? 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 Yes   When was the diagnosis? 12/2020   Is there a confirmed diagnosis from a biopsy/tissue reviewed by pathology? Were outside slides requested? NA   Is patient aware of diagnosis? Yes   Is there a personal history and what kind? No  (had pre cancer cervix at age 23)   Is there a family history and what kind? H&N ca   Reason for visit? New Diagnosis   Have you had any imaging or labs done? If so: when, where? yes  [facility] and open MRI   Are records in [facility]? yes   If patient has a prior history of breast cancer were old records obtained? NA   Was the patient told to bring a disk? yes   Does the patient smoke or Vape? no   If yes, how many packs or cartridges per day? Scheduling Information:   Preferred [place]:  [place]     Are there any dates/time the patient cannot be seen? Miscellaneous: ok to schedule per Bucky Rich   After completing the above information, please route to Financial Counselor and the appropriate Nurse Navigator for review

## 2021-01-12 ENCOUNTER — TELEPHONE (OUTPATIENT)
Dept: OBGYN CLINIC | Facility: CLINIC | Age: 46
End: 2021-01-12

## 2021-01-12 ENCOUNTER — CONSULT (OUTPATIENT)
Dept: GYNECOLOGIC ONCOLOGY | Facility: CLINIC | Age: 46
End: 2021-01-12
Payer: COMMERCIAL

## 2021-01-12 VITALS
HEIGHT: 63 IN | SYSTOLIC BLOOD PRESSURE: 112 MMHG | WEIGHT: 141 LBS | HEART RATE: 68 BPM | RESPIRATION RATE: 16 BRPM | BODY MASS INDEX: 24.98 KG/M2 | DIASTOLIC BLOOD PRESSURE: 82 MMHG | TEMPERATURE: 100 F

## 2021-01-12 DIAGNOSIS — D49.59 OVARIAN NEOPLASM: ICD-10-CM

## 2021-01-12 DIAGNOSIS — N83.201 BILATERAL OVARIAN CYSTS: Primary | ICD-10-CM

## 2021-01-12 DIAGNOSIS — N83.202 BILATERAL OVARIAN CYSTS: Primary | ICD-10-CM

## 2021-01-12 DIAGNOSIS — N94.6 DYSMENORRHEA: ICD-10-CM

## 2021-01-12 PROCEDURE — 3008F BODY MASS INDEX DOCD: CPT | Performed by: OBSTETRICS & GYNECOLOGY

## 2021-01-12 PROCEDURE — 1036F TOBACCO NON-USER: CPT | Performed by: OBSTETRICS & GYNECOLOGY

## 2021-01-12 PROCEDURE — 99243 OFF/OP CNSLTJ NEW/EST LOW 30: CPT | Performed by: OBSTETRICS & GYNECOLOGY

## 2021-01-12 RX ORDER — ACETAMINOPHEN 325 MG/1
975 TABLET ORAL ONCE
Status: CANCELLED | OUTPATIENT
Start: 2021-01-12 | End: 2021-01-12

## 2021-01-12 RX ORDER — GABAPENTIN 100 MG/1
100 CAPSULE ORAL ONCE
Status: CANCELLED | OUTPATIENT
Start: 2021-01-12 | End: 2021-01-12

## 2021-01-12 RX ORDER — LORATADINE 10 MG/1
10 TABLET ORAL
COMMUNITY

## 2021-01-12 RX ORDER — GUAIFENESIN, PSEUDOEPHEDRINE HYDROCHLORIDE 600; 60 MG/1; MG/1
1 TABLET, EXTENDED RELEASE ORAL AS NEEDED
COMMUNITY

## 2021-01-12 RX ORDER — CEFAZOLIN SODIUM 1 G/50ML
1000 SOLUTION INTRAVENOUS ONCE
Status: CANCELLED | OUTPATIENT
Start: 2021-01-12 | End: 2021-01-12

## 2021-01-12 RX ORDER — HEPARIN SODIUM 5000 [USP'U]/ML
5000 INJECTION, SOLUTION INTRAVENOUS; SUBCUTANEOUS
Status: CANCELLED | OUTPATIENT
Start: 2021-01-12 | End: 2021-01-13

## 2021-01-12 NOTE — H&P (VIEW-ONLY)
Assessment/Plan:    Problem List Items Addressed This Visit        Endocrine    Bilateral ovarian cysts - Primary     Healthy 43VC presents with imaging findings of bilateral ovarian cysts for consutlation  Pelvic us 112/21/2020  RIGHT ovary:  Redemonstrated within the right ovary is a cystic lesion containing low-level internal echoes measuring 6 1 x 4 8 x 5 1 cm, previously 6 8 x 4 9 x 5 3 cm  There is possible mural nodularity  Ovarian size:  6 1 x 4 8 x 5 1 cm = 77 3 mL   Doppler flow within normal limits  LEFT ovary:  Redemonstrated in the left ovary is an anechoic cyst measuring 4 7 x 4 0 x 4 1 cm, previously 4 7 x 3 8 x 3 4 cm  I have discussed in detail with the patient the results of her diagnostic testing, her differential diagnosis and treatment options, and the benefits and risks of these  She has a good understanding and has agreed to proceed  I have personally reviewed her records and imaging  I have reviewed differential diagnosis of a complex ovarian cyst including benign ovarian neoplasm such as mucinous or serous cystadenoma, hemorrhagic cyst; borderline tumor; or overt ovarian malignancy  The left ovarian cyst appears simple but MRI showing some concern forh emorrhagic  I have further discussed options of approaching laparoscopically vs open laparotomy  We also discussed the differences between laparotomy and laparoscopic methods  We discussed the advantage of a laparoscopic method in terms of length of hospitalization and overall recovery being reduced by a factor of 2 or 3 and the reduced amount of post-operative incision pain  She understands that the risks of major complications are approximately 5% and include but are not limited to hemorrhage requiring transfusion, intestinal/urinary tract injury, wound healing impairment, infection, thrombo-embolic complications, cardio-pulmonary and renal complications   [facility] pathology would determine whether further staging procedure necessary  Peritoneal biopsies, lymphadenectomy, omentectomy, staging was discussed  We discussed at length removal of both ovaries vs cystectomy  It would be reasonable to perform RSO and left cystectomy given imaging findings understanding that the cancer risk is not zero  Pt is reluctant to undergo cystectomy as she reports h/o many ovarian cysts and is afraid this will persist as a problem when she is post menopausal  We discussed BSO with post op HRT vs leaving one ovary in situ if feasible  This would require combo estrogen/progesterone for uterine protection and increases her risk of breast cancer  Given the recurrent nature as well as her ongoing pelvic pain/menorrhagia/dysmenorrhia, it is also reasonable to proceed with TLH/BSO with HRT estrogen alone  This would not increase risk of breast cancer and treat all of her symptoms  Pt agrees to proceed with RA-TLH/BSO at this time   Trend:  Lab Results   Component Value Date     13 01/04/2021       PATs  No clearances                Relevant Orders    Case request operating room: HYSTERECTOMY LAPAROSCOPIC TOTAL ([address]) W/ ROBOTICS (Completed)    Type and screen    Comprehensive metabolic panel    CBC and differential    HEMOGLOBIN A1C W/ EAG ESTIMATION      Other Visit Diagnoses     Ovarian neoplasm        Dysmenorrhea        Relevant Orders    Case request operating room: HYSTERECTOMY LAPAROSCOPIC TOTAL ([address]) W/ ROBOTICS (Completed)              CHIEF COMPLAINT: ovarian cyst    Oncology Problem:  Cancer Staging  No matching staging information was found for the patient  Previous therapy:  Oncology History    No history exists  Most recent imaging:  US pelvis complete w transvaginal  Narrative: PELVIC ULTRASOUND, COMPLETE    INDICATION:  50years old  N83 201: Unspecified ovarian cyst, right side  N83 202: Unspecified ovarian cyst, left side      COMPARISON:  Pelvic ultrasound 10/14/2020    TECHNIQUE:  Transabdominal pelvic ultrasound was performed in sagittal and transverse planes with a curvilinear transducer  Additional transvaginal imaging was performed to better evaluate the endometrium and ovaries  Imaging included volumetric sweeps   as well as traditional still imaging technique  FINDINGS:    UTERUS:   The uterus is anteverted in position, measuring 9 0 x 3 6 x 5 1 cm  The myometrium is again noted to be mildly heterogeneous which may be due to adenomyosis  3 mm anechoic focus may be due to a subendometrial cyst     ENDOMETRIUM:    Normal caliber of 4 mm  Homogenous and normal in appearance  OVARIES/ADNEXA:  RIGHT ovary:  Redemonstrated within the right ovary is a cystic lesion containing low-level internal echoes measuring 6 1 x 4 8 x 5 1 cm, previously 6 8 x 4 9 x 5 3 cm  There is possible mural nodularity  Ovarian size:  6 1 x 4 8 x 5 1 cm = 77 3 mL   Doppler flow within normal limits  LEFT ovary:  Redemonstrated in the left ovary is an anechoic cyst measuring 4 7 x 4 0 x 4 1 cm, previously 4 7 x 3 8 x 3 4 cm  Ovarian size:  6 0 x 4 6 x 5 1 cm = 73 8 mL   Doppler flow within normal limits  There is a trace volume of free fluid in the pelvis, likely physiologic  OTHER: On image 4, there appears to be an intraluminal echogenic focus along the right bladder wall measuring 1 7 cm  Impression:    1  Similar appearance of 6 1 cm right ovarian cystic lesion with low-level internal echoes and possible mural nodularity  Contrast-enhanced pelvic MR is recommended for further characterization  Simple-appearing left ovarian cyst measuring 4 7 cm,   similar to the prior study  2   Redemonstrated mild myometrial heterogeneity which may be due to adenomyosis  3   Apparent 1 7 cm echogenic focus within the bladder, which can be reassessed on follow-up MR or dedicated bladder ultrasound to evaluate for bladder lesion  The study was marked in EPIC for significant notification      Workstation performed: [id number]        Patient ID: Solitario Newton is a 50 y o  female  Healthy 53yo presents with ongoing/enlarging bilateral ovarian cysts for consultation  Pt reports long standing h/o ovarian cysts  Most recently iomaged in October after reported intermenstrual bleedinga nd worsening dysmenorrhia  She then had f/u ultrasound last month that showed growth of the lesions  She reports intermittent bloating/discomfort relieved by tylenol/motrin  She reports improvement in her menses since stopping OCPs  No changes in BM/urination  No early satiety/Appetite adequate  Denies significant weight loss/weight gain  Review of Systems   Constitutional: Negative for appetite change, chills, fatigue and fever  Respiratory: Negative for chest tightness and shortness of breath  Cardiovascular: Negative for chest pain  Gastrointestinal: Positive for abdominal distention  Negative for abdominal pain, constipation, diarrhea and nausea  Genitourinary: Positive for menstrual problem and pelvic pain  Negative for difficulty urinating, flank pain, frequency, urgency, vaginal bleeding, vaginal discharge and vaginal pain  Musculoskeletal: Negative for back pain, joint swelling and myalgias  Skin: Negative for rash  Neurological: Negative for dizziness, light-headedness, numbness and headaches  Hematological: Negative for adenopathy  Psychiatric/Behavioral: The patient is not nervous/anxious  Current Outpatient Medications   Medication Sig Dispense Refill    loratadine (CLARITIN) 10 mg tablet Take 10 mg by mouth daily      pseudoephedrine-guaifenesin (MUCINEX D)  MG per tablet Take 1 tablet by mouth every 12 (twelve) hours      tobramycin (TOBREX) 0 3 % SOLN Administer 1 drop to both eyes every 4 (four) hours while awake (Patient not taking: Reported on 10/8/2020) 5 mL 0     No current facility-administered medications for this visit          Allergies   Allergen Reactions    Soybean Oil GI Intolerance       History reviewed  No pertinent past medical history  Past Surgical History:   Procedure Laterality Date    LAPAROSCOPIC CHOLECYSTECTOMY      WISDOM TOOTH EXTRACTION         OB History        3    Para   3    Term   0            AB        Living   3       SAB        TAB        Ectopic        Multiple        Live Births   3                 Family History   Problem Relation Age of Onset    Tongue cancer Mother 76    No Known Problems Father     No Known Problems Sister     No Known Problems Daughter     No Known Problems Maternal Grandmother     No Known Problems Maternal Grandfather     No Known Problems Paternal Grandmother     No Known Problems Paternal Grandfather     Ovarian cancer Cousin 48    No Known Problems Son     No Known Problems Son     No Known Problems Sister     No Known Problems Paternal Aunt     No Known Problems Paternal Aunt     No Known Problems Paternal Aunt     No Known Problems Paternal Aunt     No Known Problems Paternal Aunt        The following portions of the patient's history were reviewed and updated as appropriate: allergies, current medications, past family history, past medical history, past social history, past surgical history and problem list       Objective:    Blood pressure 112/82, pulse 68, temperature 100 °F (37 8 °C), resp  rate 16, height 5' 3" (1 6 m), weight 64 kg (141 lb), not currently breastfeeding  Body mass index is 24 98 kg/m²  Physical Exam  HENT:      Head: Normocephalic and atraumatic  Neck:      Musculoskeletal: Normal range of motion  Cardiovascular:      Rate and Rhythm: Normal rate and regular rhythm  Pulmonary:      Effort: Pulmonary effort is normal    Abdominal:      Palpations: Abdomen is soft  Genitourinary:     Comments: The external female genitalia is normal  The bartholin's, uretheral and skenes glands are normal  The urethral meatus is normal (midline with no lesions)   Anus without fissure or lesion  Speculum exam reveals vagina without lesion or discharge  Cervix is normal appearing without visible lesions  No bleeding  No significant cystocele or rectocele noted  Bimanual exam notes a uterus with normal contour, mobility  Rigth adnexa anteriorly with posteriorly displaced uterus present  +mild tenderness  Bladder is without fullness, mass or tenderness  Musculoskeletal: Normal range of motion  Skin:     General: Skin is warm and dry  Neurological:      Mental Status: She is alert and oriented to person, place, and time             Lab Results   Component Value Date     13 01/04/2021     Lab Results   Component Value Date    WBC 6 2 07/19/2017    HGB 13 6 07/19/2017    HCT 41 0 07/19/2017    MCV 94 7 07/19/2017     07/19/2017     Lab Results   Component Value Date     07/19/2017    K 4 4 07/19/2017     07/19/2017    CO2 27 07/19/2017    BUN 13 07/19/2017    CREATININE 0 89 07/19/2017    CALCIUM 9 8 07/19/2017    AST 12 07/19/2017    ALT 9 07/19/2017    ALKPHOS 54 07/19/2017    PROT 6 6 07/19/2017    BILITOT 0 6 07/19/2017        Trend:  Lab Results   Component Value Date     13 01/04/2021

## 2021-01-12 NOTE — ASSESSMENT & PLAN NOTE
Healthy 46yo presents with imaging findings of bilateral ovarian cysts for consutlation  Pelvic us 112/21/2020  RIGHT ovary:  Redemonstrated within the right ovary is a cystic lesion containing low-level internal echoes measuring 6 1 x 4 8 x 5 1 cm, previously 6 8 x 4 9 x 5 3 cm  There is possible mural nodularity  Ovarian size:  6 1 x 4 8 x 5 1 cm = 77 3 mL   Doppler flow within normal limits  LEFT ovary:  Redemonstrated in the left ovary is an anechoic cyst measuring 4 7 x 4 0 x 4 1 cm, previously 4 7 x 3 8 x 3 4 cm  I have discussed in detail with the patient the results of her diagnostic testing, her differential diagnosis and treatment options, and the benefits and risks of these  She has a good understanding and has agreed to proceed  I have personally reviewed her records and imaging  I have reviewed differential diagnosis of a complex ovarian cyst including benign ovarian neoplasm such as mucinous or serous cystadenoma, hemorrhagic cyst; borderline tumor; or overt ovarian malignancy  The left ovarian cyst appears simple but MRI showing some concern forh emorrhagic  I have further discussed options of approaching laparoscopically vs open laparotomy  We also discussed the differences between laparotomy and laparoscopic methods  We discussed the advantage of a laparoscopic method in terms of length of hospitalization and overall recovery being reduced by a factor of 2 or 3 and the reduced amount of post-operative incision pain  She understands that the risks of major complications are approximately 5% and include but are not limited to hemorrhage requiring transfusion, intestinal/urinary tract injury, wound healing impairment, infection, thrombo-embolic complications, cardio-pulmonary and renal complications  [facility] pathology would determine whether further staging procedure necessary  Peritoneal biopsies, lymphadenectomy, omentectomy, staging was discussed    We discussed at length removal of both ovaries vs cystectomy  It would be reasonable to perform RSO and left cystectomy given imaging findings understanding that the cancer risk is not zero  Pt is reluctant to undergo cystectomy as she reports h/o many ovarian cysts and is afraid this will persist as a problem when she is post menopausal  We discussed BSO with post op HRT vs leaving one ovary in situ if feasible  This would require combo estrogen/progesterone for uterine protection and increases her risk of breast cancer  Given the recurrent nature as well as her ongoing pelvic pain/menorrhagia/dysmenorrhia, it is also reasonable to proceed with TLH/BSO with HRT estrogen alone  This would not increase risk of breast cancer and treat all of her symptoms  Pt agrees to proceed with RA-TLH/BSO at this time     Trend:  Lab Results   Component Value Date     13 01/04/2021       PATs  No clearances

## 2021-01-12 NOTE — PROGRESS NOTES
Assessment/Plan:    Problem List Items Addressed This Visit        Endocrine    Bilateral ovarian cysts - Primary     Healthy 46YN presents with imaging findings of bilateral ovarian cysts for consutlation  Pelvic us 112/21/2020  RIGHT ovary:  Redemonstrated within the right ovary is a cystic lesion containing low-level internal echoes measuring 6 1 x 4 8 x 5 1 cm, previously 6 8 x 4 9 x 5 3 cm  There is possible mural nodularity  Ovarian size:  6 1 x 4 8 x 5 1 cm = 77 3 mL   Doppler flow within normal limits  LEFT ovary:  Redemonstrated in the left ovary is an anechoic cyst measuring 4 7 x 4 0 x 4 1 cm, previously 4 7 x 3 8 x 3 4 cm  I have discussed in detail with the patient the results of her diagnostic testing, her differential diagnosis and treatment options, and the benefits and risks of these  She has a good understanding and has agreed to proceed  I have personally reviewed her records and imaging  I have reviewed differential diagnosis of a complex ovarian cyst including benign ovarian neoplasm such as mucinous or serous cystadenoma, hemorrhagic cyst; borderline tumor; or overt ovarian malignancy  The left ovarian cyst appears simple but MRI showing some concern forh emorrhagic  I have further discussed options of approaching laparoscopically vs open laparotomy  We also discussed the differences between laparotomy and laparoscopic methods  We discussed the advantage of a laparoscopic method in terms of length of hospitalization and overall recovery being reduced by a factor of 2 or 3 and the reduced amount of post-operative incision pain  She understands that the risks of major complications are approximately 5% and include but are not limited to hemorrhage requiring transfusion, intestinal/urinary tract injury, wound healing impairment, infection, thrombo-embolic complications, cardio-pulmonary and renal complications   Frozen pathology would determine whether further staging procedure necessary  Peritoneal biopsies, lymphadenectomy, omentectomy, staging was discussed  We discussed at length removal of both ovaries vs cystectomy  It would be reasonable to perform RSO and left cystectomy given imaging findings understanding that the cancer risk is not zero  Pt is reluctant to undergo cystectomy as she reports h/o many ovarian cysts and is afraid this will persist as a problem when she is post menopausal  We discussed BSO with post op HRT vs leaving one ovary in situ if feasible  This would require combo estrogen/progesterone for uterine protection and increases her risk of breast cancer  Given the recurrent nature as well as her ongoing pelvic pain/menorrhagia/dysmenorrhia, it is also reasonable to proceed with TLH/BSO with HRT estrogen alone  This would not increase risk of breast cancer and treat all of her symptoms  Pt agrees to proceed with RA-TLH/BSO at this time   Trend:  Lab Results   Component Value Date     13 01/04/2021       PATs  No clearances                Relevant Orders    Case request operating room: HYSTERECTOMY LAPAROSCOPIC TOTAL ([address]) W/ ROBOTICS (Completed)    Type and screen    Comprehensive metabolic panel    CBC and differential    HEMOGLOBIN A1C W/ EAG ESTIMATION      Other Visit Diagnoses     Ovarian neoplasm        Dysmenorrhea        Relevant Orders    Case request operating room: HYSTERECTOMY LAPAROSCOPIC TOTAL ([address]) W/ ROBOTICS (Completed)              CHIEF COMPLAINT: ovarian cyst    Oncology Problem:  Cancer Staging  No matching staging information was found for the patient  Previous therapy:  Oncology History    No history exists  Most recent imaging:  US pelvis complete w transvaginal  Narrative: PELVIC ULTRASOUND, COMPLETE    INDICATION:  50years old  N83 201: Unspecified ovarian cyst, right side  N83 202: Unspecified ovarian cyst, left side      COMPARISON:  Pelvic ultrasound 10/14/2020    TECHNIQUE:  Transabdominal pelvic ultrasound was performed in sagittal and transverse planes with a curvilinear transducer  Additional transvaginal imaging was performed to better evaluate the endometrium and ovaries  Imaging included volumetric sweeps   as well as traditional still imaging technique  FINDINGS:    UTERUS:   The uterus is anteverted in position, measuring 9 0 x 3 6 x 5 1 cm  The myometrium is again noted to be mildly heterogeneous which may be due to adenomyosis  3 mm anechoic focus may be due to a subendometrial cyst     ENDOMETRIUM:    Normal caliber of 4 mm  Homogenous and normal in appearance  OVARIES/ADNEXA:  RIGHT ovary:  Redemonstrated within the right ovary is a cystic lesion containing low-level internal echoes measuring 6 1 x 4 8 x 5 1 cm, previously 6 8 x 4 9 x 5 3 cm  There is possible mural nodularity  Ovarian size:  6 1 x 4 8 x 5 1 cm = 77 3 mL   Doppler flow within normal limits  LEFT ovary:  Redemonstrated in the left ovary is an anechoic cyst measuring 4 7 x 4 0 x 4 1 cm, previously 4 7 x 3 8 x 3 4 cm  Ovarian size:  6 0 x 4 6 x 5 1 cm = 73 8 mL   Doppler flow within normal limits  There is a trace volume of free fluid in the pelvis, likely physiologic  OTHER: On image 4, there appears to be an intraluminal echogenic focus along the right bladder wall measuring 1 7 cm  Impression:    1  Similar appearance of 6 1 cm right ovarian cystic lesion with low-level internal echoes and possible mural nodularity  Contrast-enhanced pelvic MR is recommended for further characterization  Simple-appearing left ovarian cyst measuring 4 7 cm,   similar to the prior study  2   Redemonstrated mild myometrial heterogeneity which may be due to adenomyosis  3   Apparent 1 7 cm echogenic focus within the bladder, which can be reassessed on follow-up MR or dedicated bladder ultrasound to evaluate for bladder lesion  The study was marked in EPIC for significant notification      Workstation performed: [id number]        Patient ID: Milady Quezada is a 50 y o  female  Healthy 53yo presents with ongoing/enlarging bilateral ovarian cysts for consultation  Pt reports long standing h/o ovarian cysts  Most recently iomaged in October after reported intermenstrual bleedinga nd worsening dysmenorrhia  She then had f/u ultrasound last month that showed growth of the lesions  She reports intermittent bloating/discomfort relieved by tylenol/motrin  She reports improvement in her menses since stopping OCPs  No changes in BM/urination  No early satiety/Appetite adequate  Denies significant weight loss/weight gain  Review of Systems   Constitutional: Negative for appetite change, chills, fatigue and fever  Respiratory: Negative for chest tightness and shortness of breath  Cardiovascular: Negative for chest pain  Gastrointestinal: Positive for abdominal distention  Negative for abdominal pain, constipation, diarrhea and nausea  Genitourinary: Positive for menstrual problem and pelvic pain  Negative for difficulty urinating, flank pain, frequency, urgency, vaginal bleeding, vaginal discharge and vaginal pain  Musculoskeletal: Negative for back pain, joint swelling and myalgias  Skin: Negative for rash  Neurological: Negative for dizziness, light-headedness, numbness and headaches  Hematological: Negative for adenopathy  Psychiatric/Behavioral: The patient is not nervous/anxious  Current Outpatient Medications   Medication Sig Dispense Refill    loratadine (CLARITIN) 10 mg tablet Take 10 mg by mouth daily      pseudoephedrine-guaifenesin (MUCINEX D)  MG per tablet Take 1 tablet by mouth every 12 (twelve) hours      tobramycin (TOBREX) 0 3 % SOLN Administer 1 drop to both eyes every 4 (four) hours while awake (Patient not taking: Reported on 10/8/2020) 5 mL 0     No current facility-administered medications for this visit          Allergies   Allergen Reactions    Soybean Oil GI Intolerance       History reviewed  No pertinent past medical history  Past Surgical History:   Procedure Laterality Date    LAPAROSCOPIC CHOLECYSTECTOMY      WISDOM TOOTH EXTRACTION         OB History        3    Para   3    Term   0            AB        Living   3       SAB        TAB        Ectopic        Multiple        Live Births   3                 Family History   Problem Relation Age of Onset    Tongue cancer Mother 76    No Known Problems Father     No Known Problems Sister     No Known Problems Daughter     No Known Problems Maternal Grandmother     No Known Problems Maternal Grandfather     No Known Problems Paternal Grandmother     No Known Problems Paternal Grandfather     Ovarian cancer Cousin 48    No Known Problems Son     No Known Problems Son     No Known Problems Sister     No Known Problems Paternal Aunt     No Known Problems Paternal Aunt     No Known Problems Paternal Aunt     No Known Problems Paternal Aunt     No Known Problems Paternal Aunt        The following portions of the patient's history were reviewed and updated as appropriate: allergies, current medications, past family history, past medical history, past social history, past surgical history and problem list       Objective:    Blood pressure 112/82, pulse 68, temperature 100 °F (37 8 °C), resp  rate 16, height 5' 3" (1 6 m), weight 64 kg (141 lb), not currently breastfeeding  Body mass index is 24 98 kg/m²  Physical Exam  HENT:      Head: Normocephalic and atraumatic  Neck:      Musculoskeletal: Normal range of motion  Cardiovascular:      Rate and Rhythm: Normal rate and regular rhythm  Pulmonary:      Effort: Pulmonary effort is normal    Abdominal:      Palpations: Abdomen is soft  Genitourinary:     Comments: The external female genitalia is normal  The bartholin's, uretheral and skenes glands are normal  The urethral meatus is normal (midline with no lesions)   Anus without fissure or lesion  Speculum exam reveals vagina without lesion or discharge  Cervix is normal appearing without visible lesions  No bleeding  No significant cystocele or rectocele noted  Bimanual exam notes a uterus with normal contour, mobility  Rigth adnexa anteriorly with posteriorly displaced uterus present  +mild tenderness  Bladder is without fullness, mass or tenderness  Musculoskeletal: Normal range of motion  Skin:     General: Skin is warm and dry  Neurological:      Mental Status: She is alert and oriented to person, place, and time             Lab Results   Component Value Date     13 01/04/2021     Lab Results   Component Value Date    WBC 6 2 07/19/2017    HGB 13 6 07/19/2017    HCT 41 0 07/19/2017    MCV 94 7 07/19/2017     07/19/2017     Lab Results   Component Value Date     07/19/2017    K 4 4 07/19/2017     07/19/2017    CO2 27 07/19/2017    BUN 13 07/19/2017    CREATININE 0 89 07/19/2017    CALCIUM 9 8 07/19/2017    AST 12 07/19/2017    ALT 9 07/19/2017    ALKPHOS 54 07/19/2017    PROT 6 6 07/19/2017    BILITOT 0 6 07/19/2017        Trend:  Lab Results   Component Value Date     13 01/04/2021

## 2021-01-13 DIAGNOSIS — Z11.59 SCREENING FOR VIRAL DISEASE: Primary | ICD-10-CM

## 2021-01-27 ENCOUNTER — LAB (OUTPATIENT)
Dept: LAB | Age: 46
End: 2021-01-27
Payer: COMMERCIAL

## 2021-01-27 DIAGNOSIS — N83.201 BILATERAL OVARIAN CYSTS: Primary | ICD-10-CM

## 2021-01-27 DIAGNOSIS — Z11.59 SCREENING FOR VIRAL DISEASE: ICD-10-CM

## 2021-01-27 DIAGNOSIS — N83.202 BILATERAL OVARIAN CYSTS: Primary | ICD-10-CM

## 2021-01-27 LAB
ABO GROUP BLD: NORMAL
ALBUMIN SERPL BCP-MCNC: 4.1 G/DL (ref 3.5–5)
ALP SERPL-CCNC: 55 U/L (ref 46–116)
ALT SERPL W P-5'-P-CCNC: 22 U/L (ref 12–78)
ANION GAP SERPL CALCULATED.3IONS-SCNC: 3 MMOL/L (ref 4–13)
AST SERPL W P-5'-P-CCNC: 18 U/L (ref 5–45)
BASOPHILS # BLD AUTO: 0.08 THOUSANDS/ΜL (ref 0–0.1)
BASOPHILS NFR BLD AUTO: 1 % (ref 0–1)
BILIRUB SERPL-MCNC: 0.52 MG/DL (ref 0.2–1)
BLD GP AB SCN SERPL QL: NEGATIVE
BUN SERPL-MCNC: 13 MG/DL (ref 5–25)
CALCIUM SERPL-MCNC: 9.6 MG/DL (ref 8.3–10.1)
CHLORIDE SERPL-SCNC: 105 MMOL/L (ref 100–108)
CO2 SERPL-SCNC: 29 MMOL/L (ref 21–32)
CREAT SERPL-MCNC: 0.86 MG/DL (ref 0.6–1.3)
EOSINOPHIL # BLD AUTO: 0.43 THOUSAND/ΜL (ref 0–0.61)
EOSINOPHIL NFR BLD AUTO: 7 % (ref 0–6)
ERYTHROCYTE [DISTWIDTH] IN BLOOD BY AUTOMATED COUNT: 12.5 % (ref 11.6–15.1)
EST. AVERAGE GLUCOSE BLD GHB EST-MCNC: 88 MG/DL
GFR SERPL CREATININE-BSD FRML MDRD: 81 ML/MIN/1.73SQ M
GLUCOSE P FAST SERPL-MCNC: 84 MG/DL (ref 65–99)
HBA1C MFR BLD: 4.7 %
HCT VFR BLD AUTO: 41.8 % (ref 34.8–46.1)
HGB BLD-MCNC: 13.9 G/DL (ref 11.5–15.4)
IMM GRANULOCYTES # BLD AUTO: 0 THOUSAND/UL (ref 0–0.2)
IMM GRANULOCYTES NFR BLD AUTO: 0 % (ref 0–2)
LYMPHOCYTES # BLD AUTO: 2.31 THOUSANDS/ΜL (ref 0.6–4.47)
LYMPHOCYTES NFR BLD AUTO: 38 % (ref 14–44)
MCH RBC QN AUTO: 31.8 PG (ref 26.8–34.3)
MCHC RBC AUTO-ENTMCNC: 33.3 G/DL (ref 31.4–37.4)
MCV RBC AUTO: 96 FL (ref 82–98)
MONOCYTES # BLD AUTO: 0.53 THOUSAND/ΜL (ref 0.17–1.22)
MONOCYTES NFR BLD AUTO: 9 % (ref 4–12)
NEUTROPHILS # BLD AUTO: 2.69 THOUSANDS/ΜL (ref 1.85–7.62)
NEUTS SEG NFR BLD AUTO: 45 % (ref 43–75)
NRBC BLD AUTO-RTO: 0 /100 WBCS
PLATELET # BLD AUTO: 265 THOUSANDS/UL (ref 149–390)
PMV BLD AUTO: 9.5 FL (ref 8.9–12.7)
POTASSIUM SERPL-SCNC: 4.4 MMOL/L (ref 3.5–5.3)
PROT SERPL-MCNC: 7.1 G/DL (ref 6.4–8.2)
RBC # BLD AUTO: 4.37 MILLION/UL (ref 3.81–5.12)
RH BLD: POSITIVE
SARS-COV-2 N GENE RESP QL NAA+PROBE: NEGATIVE
SODIUM SERPL-SCNC: 137 MMOL/L (ref 136–145)
SPECIMEN EXPIRATION DATE: NORMAL
WBC # BLD AUTO: 6.04 THOUSAND/UL (ref 4.31–10.16)

## 2021-01-27 PROCEDURE — 86850 RBC ANTIBODY SCREEN: CPT

## 2021-01-27 PROCEDURE — 36415 COLL VENOUS BLD VENIPUNCTURE: CPT

## 2021-01-27 PROCEDURE — 86901 BLOOD TYPING SEROLOGIC RH(D): CPT

## 2021-01-27 PROCEDURE — 83036 HEMOGLOBIN GLYCOSYLATED A1C: CPT

## 2021-01-27 PROCEDURE — 85025 COMPLETE CBC W/AUTO DIFF WBC: CPT

## 2021-01-27 PROCEDURE — 86900 BLOOD TYPING SEROLOGIC ABO: CPT

## 2021-01-27 PROCEDURE — 80053 COMPREHEN METABOLIC PANEL: CPT

## 2021-01-27 PROCEDURE — U0003 INFECTIOUS AGENT DETECTION BY NUCLEIC ACID (DNA OR RNA); SEVERE ACUTE RESPIRATORY SYNDROME CORONAVIRUS 2 (SARS-COV-2) (CORONAVIRUS DISEASE [COVID-19]), AMPLIFIED PROBE TECHNIQUE, MAKING USE OF HIGH THROUGHPUT TECHNOLOGIES AS DESCRIBED BY CMS-2020-01-R: HCPCS

## 2021-01-27 PROCEDURE — U0005 INFEC AGEN DETEC AMPLI PROBE: HCPCS

## 2021-01-28 NOTE — PRE-PROCEDURE INSTRUCTIONS
Pre-Surgery Instructions:   Medication Instructions    BIOTIN PO Instructed patient per Anesthesia Guidelines   Cholecalciferol (VITAMIN D3 PO) Instructed patient per Anesthesia Guidelines   FOLIC ACID PO Instructed patient per Anesthesia Guidelines   loratadine (CLARITIN) 10 mg tablet Instructed to take per normal schedule including DOS with sips water    MILK THISTLE PO Instructed patient per Anesthesia Guidelines   pseudoephedrine-guaifenesin (MUCINEX D)  MG per tablet Instructed to take as needed including DOS with sips water    Pre op instructions per [facility] protocol,medications per anesthesia guidelines and showering instructions per [facility] protocol reviewed-Patient has hibiclens  Pt  Verbalized understanding of current visitor restrictions  Instructed to avoid all ASA and OTC Vit/Supp 1 week prior to surgery and to avoid NSAIDs 3 days prior to surgery  Tylenol ok to take prn  Reviewed Carb Drink Ensure) Instructions per Surgeon/Anes  Guidelines   Pt  Verbalized an understanding of all instructions reviewed and offers no concerns at this time

## 2021-02-04 ENCOUNTER — ANESTHESIA EVENT (OUTPATIENT)
Dept: PERIOP | Facility: HOSPITAL | Age: 46
End: 2021-02-04
Payer: COMMERCIAL

## 2021-02-04 ENCOUNTER — ANESTHESIA (OUTPATIENT)
Dept: PERIOP | Facility: HOSPITAL | Age: 46
End: 2021-02-04
Payer: COMMERCIAL

## 2021-02-04 ENCOUNTER — HOSPITAL ENCOUNTER (OUTPATIENT)
Facility: HOSPITAL | Age: 46
Setting detail: OUTPATIENT SURGERY
Discharge: HOME | End: 2021-02-04
Attending: OBSTETRICS & GYNECOLOGY | Admitting: OBSTETRICS & GYNECOLOGY
Payer: COMMERCIAL

## 2021-02-04 VITALS
WEIGHT: 138 LBS | HEIGHT: 63 IN | SYSTOLIC BLOOD PRESSURE: 103 MMHG | OXYGEN SATURATION: 100 % | BODY MASS INDEX: 24.45 KG/M2 | RESPIRATION RATE: 15 BRPM | HEART RATE: 58 BPM | TEMPERATURE: 97.9 F | DIASTOLIC BLOOD PRESSURE: 59 MMHG

## 2021-02-04 VITALS — HEART RATE: 81 BPM

## 2021-02-04 DIAGNOSIS — N83.201 BILATERAL OVARIAN CYSTS: Primary | ICD-10-CM

## 2021-02-04 DIAGNOSIS — N94.6 DYSMENORRHEA: ICD-10-CM

## 2021-02-04 DIAGNOSIS — N83.202 BILATERAL OVARIAN CYSTS: Primary | ICD-10-CM

## 2021-02-04 LAB
EXT PREGNANCY TEST URINE: NEGATIVE
EXT. CONTROL: NORMAL

## 2021-02-04 PROCEDURE — 88332 PATH CONSLTJ SURG EA ADD BLK: CPT | Performed by: PATHOLOGY

## 2021-02-04 PROCEDURE — 88309 TISSUE EXAM BY PATHOLOGIST: CPT | Performed by: PATHOLOGY

## 2021-02-04 PROCEDURE — 88331 PATH CONSLTJ SURG 1 BLK 1SPC: CPT | Performed by: PATHOLOGY

## 2021-02-04 PROCEDURE — 88112 CYTOPATH CELL ENHANCE TECH: CPT | Performed by: PATHOLOGY

## 2021-02-04 PROCEDURE — NC001 PR NO CHARGE: Performed by: OBSTETRICS & GYNECOLOGY

## 2021-02-04 PROCEDURE — 81025 URINE PREGNANCY TEST: CPT | Performed by: OBSTETRICS & GYNECOLOGY

## 2021-02-04 PROCEDURE — 58573 TLH W/T/O UTERUS OVER 250 G: CPT | Performed by: OBSTETRICS & GYNECOLOGY

## 2021-02-04 RX ORDER — MAGNESIUM HYDROXIDE 1200 MG/15ML
LIQUID ORAL AS NEEDED
Status: DISCONTINUED | OUTPATIENT
Start: 2021-02-04 | End: 2021-02-04 | Stop reason: HOSPADM

## 2021-02-04 RX ORDER — BUPIVACAINE HYDROCHLORIDE 5 MG/ML
INJECTION, SOLUTION PERINEURAL AS NEEDED
Status: DISCONTINUED | OUTPATIENT
Start: 2021-02-04 | End: 2021-02-04 | Stop reason: HOSPADM

## 2021-02-04 RX ORDER — EPHEDRINE SULFATE 50 MG/ML
INJECTION INTRAVENOUS AS NEEDED
Status: DISCONTINUED | OUTPATIENT
Start: 2021-02-04 | End: 2021-02-04

## 2021-02-04 RX ORDER — PROPOFOL 10 MG/ML
INJECTION, EMULSION INTRAVENOUS CONTINUOUS PRN
Status: DISCONTINUED | OUTPATIENT
Start: 2021-02-04 | End: 2021-02-04

## 2021-02-04 RX ORDER — NEOSTIGMINE METHYLSULFATE 1 MG/ML
INJECTION INTRAVENOUS AS NEEDED
Status: DISCONTINUED | OUTPATIENT
Start: 2021-02-04 | End: 2021-02-04

## 2021-02-04 RX ORDER — CEFAZOLIN SODIUM 1 G/50ML
1000 SOLUTION INTRAVENOUS ONCE
Status: COMPLETED | OUTPATIENT
Start: 2021-02-04 | End: 2021-02-04

## 2021-02-04 RX ORDER — OXYCODONE HYDROCHLORIDE 5 MG/1
5 TABLET ORAL EVERY 4 HOURS PRN
Status: DISCONTINUED | OUTPATIENT
Start: 2021-02-04 | End: 2021-02-04 | Stop reason: HOSPADM

## 2021-02-04 RX ORDER — HEPARIN SODIUM 5000 [USP'U]/ML
5000 INJECTION, SOLUTION INTRAVENOUS; SUBCUTANEOUS
Status: COMPLETED | OUTPATIENT
Start: 2021-02-04 | End: 2021-02-04

## 2021-02-04 RX ORDER — FENTANYL CITRATE 50 UG/ML
INJECTION, SOLUTION INTRAMUSCULAR; INTRAVENOUS AS NEEDED
Status: DISCONTINUED | OUTPATIENT
Start: 2021-02-04 | End: 2021-02-04

## 2021-02-04 RX ORDER — KETOROLAC TROMETHAMINE 30 MG/ML
INJECTION, SOLUTION INTRAMUSCULAR; INTRAVENOUS AS NEEDED
Status: DISCONTINUED | OUTPATIENT
Start: 2021-02-04 | End: 2021-02-04

## 2021-02-04 RX ORDER — HYDROMORPHONE HCL/PF 1 MG/ML
0.4 SYRINGE (ML) INJECTION
Status: DISCONTINUED | OUTPATIENT
Start: 2021-02-04 | End: 2021-02-04 | Stop reason: HOSPADM

## 2021-02-04 RX ORDER — ONDANSETRON 2 MG/ML
4 INJECTION INTRAMUSCULAR; INTRAVENOUS ONCE AS NEEDED
Status: DISCONTINUED | OUTPATIENT
Start: 2021-02-04 | End: 2021-02-04 | Stop reason: HOSPADM

## 2021-02-04 RX ORDER — FENTANYL CITRATE/PF 50 MCG/ML
25 SYRINGE (ML) INJECTION
Status: DISCONTINUED | OUTPATIENT
Start: 2021-02-04 | End: 2021-02-04 | Stop reason: HOSPADM

## 2021-02-04 RX ORDER — GLYCOPYRROLATE 0.2 MG/ML
INJECTION INTRAMUSCULAR; INTRAVENOUS AS NEEDED
Status: DISCONTINUED | OUTPATIENT
Start: 2021-02-04 | End: 2021-02-04

## 2021-02-04 RX ORDER — MIDAZOLAM HYDROCHLORIDE 2 MG/2ML
INJECTION, SOLUTION INTRAMUSCULAR; INTRAVENOUS AS NEEDED
Status: DISCONTINUED | OUTPATIENT
Start: 2021-02-04 | End: 2021-02-04

## 2021-02-04 RX ORDER — SODIUM CHLORIDE 9 MG/ML
INJECTION, SOLUTION INTRAVENOUS CONTINUOUS PRN
Status: DISCONTINUED | OUTPATIENT
Start: 2021-02-04 | End: 2021-02-04

## 2021-02-04 RX ORDER — ROCURONIUM BROMIDE 10 MG/ML
INJECTION, SOLUTION INTRAVENOUS AS NEEDED
Status: DISCONTINUED | OUTPATIENT
Start: 2021-02-04 | End: 2021-02-04

## 2021-02-04 RX ORDER — ONDANSETRON 2 MG/ML
4 INJECTION INTRAMUSCULAR; INTRAVENOUS EVERY 6 HOURS PRN
Status: CANCELLED | OUTPATIENT
Start: 2021-02-04

## 2021-02-04 RX ORDER — DEXMEDETOMIDINE HYDROCHLORIDE 100 UG/ML
INJECTION, SOLUTION INTRAVENOUS AS NEEDED
Status: DISCONTINUED | OUTPATIENT
Start: 2021-02-04 | End: 2021-02-04

## 2021-02-04 RX ORDER — IBUPROFEN 600 MG/1
600 TABLET ORAL EVERY 6 HOURS PRN
Status: DISCONTINUED | OUTPATIENT
Start: 2021-02-04 | End: 2021-02-04 | Stop reason: HOSPADM

## 2021-02-04 RX ORDER — SODIUM CHLORIDE 9 MG/ML
INJECTION, SOLUTION INTRAVENOUS AS NEEDED
Status: DISCONTINUED | OUTPATIENT
Start: 2021-02-04 | End: 2021-02-04 | Stop reason: HOSPADM

## 2021-02-04 RX ORDER — DEXAMETHASONE SODIUM PHOSPHATE 4 MG/ML
INJECTION, SOLUTION INTRA-ARTICULAR; INTRALESIONAL; INTRAMUSCULAR; INTRAVENOUS; SOFT TISSUE AS NEEDED
Status: DISCONTINUED | OUTPATIENT
Start: 2021-02-04 | End: 2021-02-04

## 2021-02-04 RX ORDER — PROPOFOL 10 MG/ML
INJECTION, EMULSION INTRAVENOUS AS NEEDED
Status: DISCONTINUED | OUTPATIENT
Start: 2021-02-04 | End: 2021-02-04

## 2021-02-04 RX ORDER — ONDANSETRON 2 MG/ML
INJECTION INTRAMUSCULAR; INTRAVENOUS AS NEEDED
Status: DISCONTINUED | OUTPATIENT
Start: 2021-02-04 | End: 2021-02-04

## 2021-02-04 RX ORDER — GABAPENTIN 100 MG/1
100 CAPSULE ORAL ONCE
Status: COMPLETED | OUTPATIENT
Start: 2021-02-04 | End: 2021-02-04

## 2021-02-04 RX ORDER — ESTRADIOL 0.07 MG/D
1 FILM, EXTENDED RELEASE TRANSDERMAL 2 TIMES WEEKLY
Qty: 24 PATCH | Refills: 3 | Status: SHIPPED | OUTPATIENT
Start: 2021-02-04 | End: 2021-12-27 | Stop reason: SDUPTHER

## 2021-02-04 RX ORDER — MEPERIDINE HYDROCHLORIDE 25 MG/ML
12.5 INJECTION INTRAMUSCULAR; INTRAVENOUS; SUBCUTANEOUS
Status: DISCONTINUED | OUTPATIENT
Start: 2021-02-04 | End: 2021-02-04 | Stop reason: HOSPADM

## 2021-02-04 RX ORDER — OXYCODONE HYDROCHLORIDE 5 MG/1
10 TABLET ORAL EVERY 4 HOURS PRN
Status: DISCONTINUED | OUTPATIENT
Start: 2021-02-04 | End: 2021-02-04 | Stop reason: HOSPADM

## 2021-02-04 RX ORDER — SODIUM CHLORIDE, SODIUM LACTATE, POTASSIUM CHLORIDE, CALCIUM CHLORIDE 600; 310; 30; 20 MG/100ML; MG/100ML; MG/100ML; MG/100ML
INJECTION, SOLUTION INTRAVENOUS CONTINUOUS PRN
Status: DISCONTINUED | OUTPATIENT
Start: 2021-02-04 | End: 2021-02-04

## 2021-02-04 RX ORDER — OXYCODONE HYDROCHLORIDE 5 MG/1
5 TABLET ORAL EVERY 4 HOURS PRN
Qty: 15 TABLET | Refills: 0 | Status: SHIPPED | OUTPATIENT
Start: 2021-02-04 | End: 2021-02-14

## 2021-02-04 RX ORDER — ACETAMINOPHEN 325 MG/1
975 TABLET ORAL ONCE
Status: COMPLETED | OUTPATIENT
Start: 2021-02-04 | End: 2021-02-04

## 2021-02-04 RX ORDER — LIDOCAINE HYDROCHLORIDE 10 MG/ML
INJECTION, SOLUTION EPIDURAL; INFILTRATION; INTRACAUDAL; PERINEURAL AS NEEDED
Status: DISCONTINUED | OUTPATIENT
Start: 2021-02-04 | End: 2021-02-04

## 2021-02-04 RX ORDER — PROMETHAZINE HYDROCHLORIDE 25 MG/ML
25 INJECTION, SOLUTION INTRAMUSCULAR; INTRAVENOUS ONCE AS NEEDED
Status: DISCONTINUED | OUTPATIENT
Start: 2021-02-04 | End: 2021-02-04 | Stop reason: HOSPADM

## 2021-02-04 RX ADMIN — FENTANYL CITRATE 25 MCG: 50 INJECTION, SOLUTION INTRAMUSCULAR; INTRAVENOUS at 14:45

## 2021-02-04 RX ADMIN — GLYCOPYRROLATE 0.4 MG: 0.2 INJECTION, SOLUTION INTRAMUSCULAR; INTRAVENOUS at 14:32

## 2021-02-04 RX ADMIN — CEFAZOLIN SODIUM 1000 MG: 1 SOLUTION INTRAVENOUS at 12:00

## 2021-02-04 RX ADMIN — ROCURONIUM BROMIDE 10 MG: 10 INJECTION, SOLUTION INTRAVENOUS at 13:57

## 2021-02-04 RX ADMIN — PROPOFOL 200 MG: 10 INJECTION, EMULSION INTRAVENOUS at 12:09

## 2021-02-04 RX ADMIN — GABAPENTIN 100 MG: 100 CAPSULE ORAL at 11:41

## 2021-02-04 RX ADMIN — ROCURONIUM BROMIDE 20 MG: 10 INJECTION, SOLUTION INTRAVENOUS at 12:40

## 2021-02-04 RX ADMIN — SODIUM CHLORIDE, SODIUM LACTATE, POTASSIUM CHLORIDE, AND CALCIUM CHLORIDE: .6; .31; .03; .02 INJECTION, SOLUTION INTRAVENOUS at 12:07

## 2021-02-04 RX ADMIN — FENTANYL CITRATE 25 MCG: 50 INJECTION, SOLUTION INTRAMUSCULAR; INTRAVENOUS at 14:50

## 2021-02-04 RX ADMIN — LIDOCAINE HYDROCHLORIDE 50 MG: 10 INJECTION, SOLUTION EPIDURAL; INFILTRATION; INTRACAUDAL at 12:09

## 2021-02-04 RX ADMIN — DEXMEDETOMIDINE HYDROCHLORIDE 8 MCG: 100 INJECTION, SOLUTION INTRAVENOUS at 13:43

## 2021-02-04 RX ADMIN — FENTANYL CITRATE 25 MCG: 50 INJECTION, SOLUTION INTRAMUSCULAR; INTRAVENOUS at 14:41

## 2021-02-04 RX ADMIN — PROPOFOL 20 MCG/KG/MIN: 10 INJECTION, EMULSION INTRAVENOUS at 12:11

## 2021-02-04 RX ADMIN — EPHEDRINE SULFATE 5 MG: 50 INJECTION, SOLUTION INTRAVENOUS at 13:49

## 2021-02-04 RX ADMIN — PHENYLEPHRINE HYDROCHLORIDE 100 MCG: 10 INJECTION INTRAVENOUS at 13:49

## 2021-02-04 RX ADMIN — HEPARIN SODIUM 5000 UNITS: 5000 INJECTION INTRAVENOUS; SUBCUTANEOUS at 11:44

## 2021-02-04 RX ADMIN — DEXAMETHASONE SODIUM PHOSPHATE 4 MG: 4 INJECTION INTRA-ARTICULAR; INTRALESIONAL; INTRAMUSCULAR; INTRAVENOUS; SOFT TISSUE at 12:28

## 2021-02-04 RX ADMIN — KETOROLAC TROMETHAMINE 15 MG: 30 INJECTION, SOLUTION INTRAMUSCULAR at 14:18

## 2021-02-04 RX ADMIN — ACETAMINOPHEN 975 MG: 325 TABLET, FILM COATED ORAL at 11:41

## 2021-02-04 RX ADMIN — ONDANSETRON 4 MG: 2 INJECTION INTRAMUSCULAR; INTRAVENOUS at 14:09

## 2021-02-04 RX ADMIN — MIDAZOLAM HYDROCHLORIDE 1 MG: 1 INJECTION, SOLUTION INTRAMUSCULAR; INTRAVENOUS at 12:08

## 2021-02-04 RX ADMIN — FENTANYL CITRATE 100 MCG: 50 INJECTION, SOLUTION INTRAMUSCULAR; INTRAVENOUS at 12:09

## 2021-02-04 RX ADMIN — ROCURONIUM BROMIDE 50 MG: 10 INJECTION, SOLUTION INTRAVENOUS at 12:10

## 2021-02-04 RX ADMIN — DEXMEDETOMIDINE HYDROCHLORIDE 8 MCG: 100 INJECTION, SOLUTION INTRAVENOUS at 12:31

## 2021-02-04 RX ADMIN — EPHEDRINE SULFATE 5 MG: 50 INJECTION, SOLUTION INTRAVENOUS at 13:37

## 2021-02-04 RX ADMIN — MIDAZOLAM HYDROCHLORIDE 1 MG: 1 INJECTION, SOLUTION INTRAMUSCULAR; INTRAVENOUS at 12:02

## 2021-02-04 RX ADMIN — NEOSTIGMINE METHYLSULFATE 3 MG: 1 INJECTION INTRAVENOUS at 14:32

## 2021-02-04 RX ADMIN — ROCURONIUM BROMIDE 20 MG: 10 INJECTION, SOLUTION INTRAVENOUS at 13:13

## 2021-02-04 RX ADMIN — FENTANYL CITRATE 25 MCG: 50 INJECTION, SOLUTION INTRAMUSCULAR; INTRAVENOUS at 14:14

## 2021-02-04 RX ADMIN — PHENYLEPHRINE HYDROCHLORIDE 100 MCG: 10 INJECTION INTRAVENOUS at 13:37

## 2021-02-04 RX ADMIN — SODIUM CHLORIDE: 0.9 INJECTION, SOLUTION INTRAVENOUS at 12:13

## 2021-02-04 NOTE — INTERVAL H&P NOTE
H&P reviewed  After examining the patient I find no changes in the patients condition since the H&P had been written      Vitals:    02/04/21 1124   BP: 98/56   Pulse: 60   Resp: 20   Temp: 98 9 °F (37 2 °C)   SpO2: 100%

## 2021-02-04 NOTE — DISCHARGE INSTRUCTIONS
Robot Assisted Laparoscopic Hysterectomy   WHAT YOU SHOULD KNOW:   Robot-assisted laparoscopic hysterectomy (RH) is surgery to remove your uterus and cervix using a machine controlled by your surgeon  Your ovaries, fallopian tubes, supporting tissues, some lymph nodes, and the top of your vagina may also be removed  After RH, you will not be able to become pregnant  You will go through menopause if your ovaries are removed  AFTER YOU LEAVE:   Medicines:  · Medicines  may be given to decrease pain or prevent a bacterial infection  You may also need to take hormone medicine such as estrogen  Ask your healthcare provider how to take this medicine safely  · Take your medicine as directed  Call your healthcare provider if you think your medicine is not helping or if you have side effects  Tell him if you are allergic to any medicine  Keep a list of the medicines, vitamins, and herbs you take  Include the amounts, and when and why you take them  Bring the list or the pill bottles to follow-up visits  Carry your medicine list with you in case of an emergency  Activity guidelines:   · You may feel like resting more after surgery  Slowly start to do more each day  Rest when you feel it is needed  · Ask when you can start having sexual intercourse again  What to expect after surgery: It is normal to bleed from your vagina after your uterus and cervix are removed  Change the sanitary pad often to prevent infection  Ask your gynecologist or healthcare provider how much bleeding to expect  Contact your healthcare provider if:   · You have a fever  · Your pain is getting worse, even after you take medicine  · Your incisions look red and swollen, or they have bad-smelling drainage coming from them  · You see new or an increased amount of bright red blood coming from your vagina or your incisions  · You have yellow, green, or bad-smelling discharge coming from your vagina      · You feel pain when you urinate or you need to urinate more often than usual     · You have trouble having a bowel movement  · Your skin is itchy, swollen, or has a rash  · You have questions or concerns about your condition or care  Seek care immediately or call 911 if:   · You feel lightheaded, short of breath, and have chest pain  · You cough up blood  · Your arm or leg feels warm, tender, and painful  It may look swollen and red  · You have more bleeding from your vagina than you were told to expect  © 2014 [address] [employer] is for End User's use only and may not be sold, redistributed or otherwise used for commercial purposes  All illustrations and images included in CareNotes® are the copyrighted property of [employer] , [email or web address]  or Ashok Verma  The above information is an  only  It is not intended as medical advice for individual conditions or treatments  Talk to your doctor, nurse or pharmacist before following any medical regimen to see if it is safe and effective for you

## 2021-02-04 NOTE — ANESTHESIA POSTPROCEDURE EVALUATION
Post-Op Assessment Note    CV Status:  Stable  Pain Score: 4    Pain management: adequate     Mental Status:  Alert and awake   Hydration Status:  Euvolemic   PONV Controlled:  Controlled   Airway Patency:  Patent      Post Op Vitals Reviewed: Yes      Staff: CRNA         No complications documented      /60 (02/04/21 1442)    Temp (!) 97 4 °F (36 3 °C) (02/04/21 1442)    Pulse 62 (02/04/21 1442)   Resp 15 (02/04/21 1442)    SpO2 100 % (02/04/21 1442)

## 2021-02-04 NOTE — OP NOTE
OPERATIVE REPORT  PATIENT NAME: Jarett Calvillo    :  1975  MRN: [id number]  Pt Location: AN OR ROOM 04    SURGERY DATE: 2021    Surgeon(s) and Role:     * Augusto Lewis MD - Primary     * Royer Holley MD - Assisting     * GREG Mora-C - Assisting    Preop Diagnosis:  Bilateral ovarian cysts [N83 201, N83 202]  Dysmenorrhea [N94 6]    Post-Op Diagnosis Codes:     * Bilateral ovarian cysts [N83 201, N83 202]     * Dysmenorrhea [N94 6]    Procedure(s) (LRB):  ROBOTIC TOTAL LAPAROSCOPIC HYSTERECTOMY, BILATERAL SALPINGO-OOPHORECTOMY (N/A)    Specimen(s):  ID Type Source Tests Collected by Time Destination   1 :  Washing Pelvic Washing NON-GYNECOLOGIC CYTOLOGY Augusto Lewis MD 2021 12:55 PM    2 :  Tissue Uterus w/Bilateral Ovaries and Fallopian Tubes TISSUE EXAM Augusto Lweis MD 2021  1:13 PM        Estimated Blood Loss:   Minimal    Drains:  Urethral Catheter Non-latex 16 Fr  (Active)   Number of days: 0       Anesthesia Type:   General    Operative Indications:  Bilateral ovarian cysts [N83 201, N83 202]  Dysmenorrhea [N94 6]  The patient is a 50y o  year-old with a history of chronic pelvic pain and recurrent ovarian cysts  The results of her diagnostic testing, her differential diagnosis and treatment options, and the benefits and risks of these were reviewed  She opted to proceed with surgical management  Operative Findings:  Normal bowel, omentum, liver  Normal appearing upper abdomen  Normal appearing uterus, bilateral fallopian tubes  Bialterally ovaries enlarged to aprpoxiamtely 4-5cm with smooth walled complex cysts  Frozen c/w benign cysts    Complications:   None    Procedure and Technique:  The patient was brought to the Operating Room where general anesthesia was induced  The abdomen, vagina and perineum were prepped and draped  Atraumatic vaginal retractors were placed to identify the cervix and complete the examination   The cervix was grasped with a single tooth tenaculum  The cervix was easily dilated and the uterus sounded to 8 cm  A medium Koh ring was placed around the cervix  Then an 8 cm REKHA cannula was placed in the uterine cavity without difficulty and the vaginal balloon inflated  A Song catheter was placed in a sterile fashion  A point incision was made in the umbilicus, and with the abdomen under anterior traction with two marleni-umbilical towel clamps, a Veress needle was inserted into the abdominal cavity with the carbon dioxide on low flow  Immediate pressure drop to 0 mm and diffuse abdominal distention indicated intraperitoneal placement  The peritoneal cavity was then insufflated with carbon dioxide on high flow to maintain a pressure of 15 mm Hg throughout the case  An incision was made 25 cm above the pubic symphysis, through which the robotic trocar was introduced into the abdominal cavity  The laparoscope was then inserted and the peritoneal cavity explored with the above findings  There was no evidence of visceral injury  Additional robotic ports and a 8mm assistant port were placed under direct visualization  With the patient in steep Trendelenburg, the robot was docked to the robotic ports and the instruments were placed under direct visualization  With the uterus on upward traction, the peritoneum was incised in planes lateral and parallel to the ovarian vessels on both sides  The ureters were then identified retroperitoneally, coursing well posterior to the ovarian vessels  The ovarian vessels were coagulated proximally using the bipolar current then divided using the unipolar scissors  The round ligaments were isolated, cauterized and cut  The posterior peritoneum was dropped to the level of the koh ring  The vesico-uterine peritoneum was incised, and the bladder dissected from the cervix and upper vagina in a meticulous fashion to the level of the koh ring   The uterine vessels were then skeletonized bilaterally and coagulated at the level of the koh ring using bipolar current, then divided  With the bladder mobilized anteriorally and the rectum well away posteriorally, using the monopolar device, an incision was made in the anterior upper vagina at the level of the cervical-vaginal junction  The incision was continued circumferentially around the upper vagina, controlling upper vaginal blood supply laterally using the biopolar  This allowed completely amputation of the specimen  The vaginal balloon was removed  The uterus, cervix, bilateral fallopian tubes and ovaries were then removed en bloc transvaginally  The upper vagina were then closed laparoscopically with 2-0 stratafix, taking care to avoid bladder injury  The upper vagina was intact and hemostatic  The vaginal balloon was removed  The robotic instruments were removed, and the robot undocked  The laparoscopic skin incisions were irrigated and made hemostatic using electrocoagulation  They were closed with subcuticular stitches of 4-0 monocryl  All sponge, needle and instrument counts were correct x2  Anesthesia was reversed and the patient was taken to the PACU in a stable condition      I was present for the entire procedure    Patient Disposition:  PACU     SIGNATURE: Tc Pierre MD  DATE: February 4, 2021  TIME: 1:47 PM

## 2021-02-08 NOTE — ANESTHESIA PREPROCEDURE EVALUATION
Procedure:  ROBOTIC TOTAL LAPAROSCOPIC HYSTERECTOMY, BILATERAL SALPINGO-OOPHORECTOMY (N/A Abdomen)    Relevant Problems   No relevant active problems             Anesthesia Plan  ASA Score- 2     Anesthesia Type-     Plan Factors-    Induction-     Postoperative Plan-     Informed Consent-

## 2021-02-17 ENCOUNTER — OFFICE VISIT (OUTPATIENT)
Dept: GYNECOLOGIC ONCOLOGY | Facility: CLINIC | Age: 46
End: 2021-02-17

## 2021-02-17 VITALS
HEIGHT: 63 IN | SYSTOLIC BLOOD PRESSURE: 110 MMHG | BODY MASS INDEX: 24.45 KG/M2 | RESPIRATION RATE: 16 BRPM | HEART RATE: 76 BPM | WEIGHT: 138 LBS | TEMPERATURE: 97.9 F | DIASTOLIC BLOOD PRESSURE: 64 MMHG | OXYGEN SATURATION: 100 %

## 2021-02-17 DIAGNOSIS — N83.201 BILATERAL OVARIAN CYSTS: Primary | ICD-10-CM

## 2021-02-17 DIAGNOSIS — E89.40 SURGICAL MENOPAUSE: ICD-10-CM

## 2021-02-17 DIAGNOSIS — N83.202 BILATERAL OVARIAN CYSTS: Primary | ICD-10-CM

## 2021-02-17 PROCEDURE — 99024 POSTOP FOLLOW-UP VISIT: CPT | Performed by: OBSTETRICS & GYNECOLOGY

## 2021-02-17 NOTE — ASSESSMENT & PLAN NOTE
53yo s/p RA-TLH/BSO for persistent ovarian cysts presents for post op  Pathology c/w benign findings  Recovering well post op  No need for further oncologic surveillance  Continue routine gyn exam/mammo

## 2021-02-17 NOTE — PROGRESS NOTES
Assessment/Plan:    Problem List Items Addressed This Visit        Endocrine    Bilateral ovarian cysts - Primary     51yo s/p RA-TLH/BSO for persistent ovarian cysts presents for post op  Pathology c/w benign findings  Recovering well post op  No need for further oncologic surveillance  Continue routine gyn exam/mammo  Other    Surgical menopause     Continue transdermal estrogen  CHIEF COMPLAINT: post op       Problem:  Cancer Staging  No matching staging information was found for the patient  Previous therapy:  Oncology History    No history exists  Patient ID: Weston Molina is a 50 y o  female  51yo with bilateral ovarian cysts s/p RA_TLH/BSO presents for post op visit  Recovering well post operative  Denies vaginal bleeding/discharge  Normal BM/urination  No pelvic pain/abdominal pain  Appetite adequate  Feels the patch is helping - by last day she feels "pablo" but otherwise without symptoms         The following portions of the patient's history were reviewed and updated as appropriate: allergies, current medications, past family history, past medical history, past social history, past surgical history and problem list     Review of Systems   Constitutional: Negative for appetite change, chills, fatigue and fever  Respiratory: Negative for chest tightness and shortness of breath  Gastrointestinal: Negative for abdominal distention, abdominal pain, constipation, diarrhea and nausea  Genitourinary: Negative for difficulty urinating, flank pain, frequency, urgency, vaginal bleeding, vaginal discharge and vaginal pain  Musculoskeletal: Negative for back pain, joint swelling and myalgias  Skin: Negative for rash  Neurological: Negative for dizziness, light-headedness, numbness and headaches           Current Outpatient Medications:     BIOTIN PO, Take by mouth daily, Disp: , Rfl:     Cholecalciferol (VITAMIN D3 PO), Take by mouth daily, Disp: , Rfl:     estradiol (VIVELLE-DOT) 0 075 MG/24HR, Place 1 patch on the skin 2 (two) times a week, Disp: 24 patch, Rfl: 3    FOLIC ACID PO, Take by mouth daily, Disp: , Rfl:     loratadine (CLARITIN) 10 mg tablet, Take 10 mg by mouth daily in the early morning , Disp: , Rfl:     MILK THISTLE PO, Take by mouth daily, Disp: , Rfl:     pseudoephedrine-guaifenesin (MUCINEX D)  MG per tablet, Take 1 tablet by mouth as needed , Disp: , Rfl:     Objective:    Blood pressure 110/64, pulse 76, temperature 97 9 °F (36 6 °C), temperature source Temporal, resp  rate 16, height 5' 3" (1 6 m), weight 62 6 kg (138 lb), SpO2 100 %, not currently breastfeeding  Body mass index is 24 45 kg/m²  Body surface area is 1 65 meters squared  Physical Exam  HENT:      Head: Normocephalic and atraumatic  Neck:      Musculoskeletal: Normal range of motion  Cardiovascular:      Rate and Rhythm: Normal rate and regular rhythm  Pulmonary:      Effort: Pulmonary effort is normal    Abdominal:      General: There is no distension  Palpations: Abdomen is soft  There is no mass  Comments: Incisions c/d/i   Genitourinary:     Comments: The external female genitalia is normal  The bartholin's, uretheral and skenes glands are normal  The urethral meatus is normal (midline with no lesions)  Anus without fissure or lesion  Speculum exam reveals a grossly normal vagina cuff that is healing well  No masses, lesions,discharge or bleeding  Bimanual exam notes a surgical absent cervix, uterus and adnexal structures  No masses or fullness  Bladder is without fullness, mass or tenderness  Musculoskeletal: Normal range of motion  Skin:     General: Skin is warm and dry  Neurological:      Mental Status: She is alert and oriented to person, place, and time

## 2021-04-08 DIAGNOSIS — Z23 ENCOUNTER FOR IMMUNIZATION: ICD-10-CM

## 2021-05-08 ENCOUNTER — OFFICE VISIT (OUTPATIENT)
Dept: URGENT CARE | Facility: CLINIC | Age: 46
End: 2021-05-08
Payer: COMMERCIAL

## 2021-05-08 VITALS
SYSTOLIC BLOOD PRESSURE: 108 MMHG | TEMPERATURE: 97.8 F | HEART RATE: 69 BPM | BODY MASS INDEX: 24.1 KG/M2 | WEIGHT: 136 LBS | DIASTOLIC BLOOD PRESSURE: 68 MMHG | RESPIRATION RATE: 18 BRPM | OXYGEN SATURATION: 100 % | HEIGHT: 63 IN

## 2021-05-08 DIAGNOSIS — N39.0 URINARY TRACT INFECTION WITH HEMATURIA, SITE UNSPECIFIED: Primary | ICD-10-CM

## 2021-05-08 DIAGNOSIS — R31.9 URINARY TRACT INFECTION WITH HEMATURIA, SITE UNSPECIFIED: Primary | ICD-10-CM

## 2021-05-08 LAB
SL AMB  POCT GLUCOSE, UA: NEGATIVE
SL AMB LEUKOCYTE ESTERASE,UA: ABNORMAL
SL AMB POCT BILIRUBIN,UA: NEGATIVE
SL AMB POCT BLOOD,UA: ABNORMAL
SL AMB POCT CLARITY,UA: CLEAR
SL AMB POCT COLOR,UA: YELLOW
SL AMB POCT KETONES,UA: ABNORMAL
SL AMB POCT NITRITE,UA: NEGATIVE
SL AMB POCT PH,UA: 7.5
SL AMB POCT SPECIFIC GRAVITY,UA: 1
SL AMB POCT URINE PROTEIN: NEGATIVE
SL AMB POCT UROBILINOGEN: 0.2

## 2021-05-08 PROCEDURE — 81002 URINALYSIS NONAUTO W/O SCOPE: CPT | Performed by: PHYSICIAN ASSISTANT

## 2021-05-08 PROCEDURE — 87186 SC STD MICRODIL/AGAR DIL: CPT | Performed by: PHYSICIAN ASSISTANT

## 2021-05-08 PROCEDURE — G0382 LEV 3 HOSP TYPE B ED VISIT: HCPCS | Performed by: PHYSICIAN ASSISTANT

## 2021-05-08 PROCEDURE — 87086 URINE CULTURE/COLONY COUNT: CPT | Performed by: PHYSICIAN ASSISTANT

## 2021-05-08 PROCEDURE — S9083 URGENT CARE CENTER GLOBAL: HCPCS | Performed by: PHYSICIAN ASSISTANT

## 2021-05-08 PROCEDURE — 87077 CULTURE AEROBIC IDENTIFY: CPT | Performed by: PHYSICIAN ASSISTANT

## 2021-05-08 RX ORDER — PHENAZOPYRIDINE HYDROCHLORIDE 100 MG/1
100 TABLET, FILM COATED ORAL 3 TIMES DAILY PRN
Qty: 15 TABLET | Refills: 0 | Status: SHIPPED | OUTPATIENT
Start: 2021-05-08 | End: 2021-09-21

## 2021-05-08 RX ORDER — SULFAMETHOXAZOLE AND TRIMETHOPRIM 800; 160 MG/1; MG/1
1 TABLET ORAL EVERY 12 HOURS SCHEDULED
Qty: 6 TABLET | Refills: 0 | Status: SHIPPED | OUTPATIENT
Start: 2021-05-08 | End: 2021-05-11

## 2021-05-08 NOTE — PROGRESS NOTES
[facility]        NAME: Boubacar Dang is a 48 y o  female  : 1975    MRN: [id number]  DATE: May 8, 2021  TIME: 11:01 AM    Assessment and Plan   Urinary tract infection with hematuria, site unspecified [N39 0, R31 9]  1  Urinary tract infection with hematuria, site unspecified  POCT urine dip    Urine culture    phenazopyridine (PYRIDIUM) 100 mg tablet    sulfamethoxazole-trimethoprim (BACTRIM DS) 800-160 mg per tablet         Patient Instructions     Urine dip positive, will culture   Take pyridium as directed   Take antibiotic as directed with food  Recommend probiotics for side effects  Continue with over-the-counter symptom relief  Monitor for worsening symptoms    Follow up with PCP in 3-5 days  Proceed to  ER if symptoms worsen  Chief Complaint     Chief Complaint   Patient presents with    Urinary Tract Infection     started 2 days ago, urinary frequency, burning with urination, abdominal pressure  no flank pain at this time, OTC urinary meds used yesterday (AZO), no fevers, no cold symptoms          History of Present Illness       Patient presents with complaint of dysuria since yesterday  She reports associated increased urinary urgency, frequency, and suprapubic pressure  She states that last night she had some general malaise, feeling somewhat feverish/chills  She denies known fever and states that she is otherwise well today apart from the urinary symptoms  She denies known antibiotic allergies and recent antibiotic use  She reports having UTIs in her 25s and tolerated Bactrim well then  She denies back pain, flank pain, nausea, vomiting, and diarrhea  Review of Systems   Review of Systems   Constitutional: Negative for chills and fever  HENT: Negative for ear pain and sore throat  Eyes: Negative for pain and visual disturbance  Respiratory: Negative for cough and shortness of breath  Cardiovascular: Negative for chest pain and palpitations  Gastrointestinal: Negative for abdominal pain and vomiting  Genitourinary: Positive for dysuria, frequency, pelvic pain and urgency  Negative for hematuria  Musculoskeletal: Negative for arthralgias and back pain  Skin: Negative for color change and rash  Neurological: Negative for seizures and syncope  All other systems reviewed and are negative          Current Medications       Current Outpatient Medications:     BIOTIN PO, Take by mouth daily, Disp: , Rfl:     Cholecalciferol (VITAMIN D3 PO), Take by mouth daily, Disp: , Rfl:     estradiol (VIVELLE-DOT) 0 075 MG/24HR, Place 1 patch on the skin 2 (two) times a week, Disp: 24 patch, Rfl: 3    FOLIC ACID PO, Take by mouth daily, Disp: , Rfl:     loratadine (CLARITIN) 10 mg tablet, Take 10 mg by mouth daily in the early morning , Disp: , Rfl:     MILK THISTLE PO, Take by mouth daily, Disp: , Rfl:     pseudoephedrine-guaifenesin (MUCINEX D)  MG per tablet, Take 1 tablet by mouth as needed , Disp: , Rfl:     phenazopyridine (PYRIDIUM) 100 mg tablet, Take 1 tablet (100 mg total) by mouth 3 (three) times a day as needed for bladder spasms, Disp: 15 tablet, Rfl: 0    sulfamethoxazole-trimethoprim (BACTRIM DS) 800-160 mg per tablet, Take 1 tablet by mouth every 12 (twelve) hours for 3 days, Disp: 6 tablet, Rfl: 0    Current Allergies     Allergies as of 05/08/2021 - Reviewed 05/08/2021   Allergen Reaction Noted    Soybean oil - food allergy GI Intolerance 01/12/2021            The following portions of the patient's history were reviewed and updated as appropriate: allergies, current medications, past family history, past medical history, past social history, past surgical history and problem list      Past Medical History:   Diagnosis Date    Gastritis     Age 19    GERD (gastroesophageal reflux disease)     Heart murmur        Past Surgical History:   Procedure Laterality Date    EGD      LAPAROSCOPIC CHOLECYSTECTOMY  1992    AL LAPAROSCOPY W TOT HYSTERECTUTERUS <=250 GRAM  W TUBE/OVARY N/A 2/4/2021    Procedure: ROBOTIC TOTAL LAPAROSCOPIC HYSTERECTOMY, BILATERAL SALPINGO-OOPHORECTOMY;  Surgeon: Cloyde Castleman, MD;  [facility];  Service: Gynecology Oncology    WISDOM TOOTH EXTRACTION  1991       Family History   Problem Relation Age of Onset    Tongue cancer Mother 76    No Known Problems Father     No Known Problems Sister     No Known Problems Daughter     No Known Problems Maternal Grandmother     No Known Problems Maternal Grandfather     No Known Problems Paternal Grandmother     No Known Problems Paternal Grandfather     Ovarian cancer Cousin 48    No Known Problems Son     No Known Problems Son     No Known Problems Sister     No Known Problems Paternal Aunt     No Known Problems Paternal Aunt     No Known Problems Paternal Aunt     No Known Problems Paternal Aunt     No Known Problems Paternal Aunt          Medications have been verified  Objective   /68   Pulse 69   Temp 97 8 °F (36 6 °C) (Tympanic)   Resp 18   Ht 5' 3" (1 6 m)   Wt 61 7 kg (136 lb)   SpO2 100%   BMI 24 09 kg/m²   No LMP recorded  Physical Exam     Physical Exam  Vitals signs and nursing note reviewed  Constitutional:       General: She is not in acute distress  Appearance: Normal appearance  She is well-developed  She is not ill-appearing or diaphoretic  HENT:      Head: Normocephalic and atraumatic  Eyes:      Conjunctiva/sclera: Conjunctivae normal       Pupils: Pupils are equal, round, and reactive to light  Neck:      Musculoskeletal: Normal range of motion and neck supple  Cardiovascular:      Rate and Rhythm: Normal rate and regular rhythm  Heart sounds: Normal heart sounds  Pulmonary:      Effort: Pulmonary effort is normal  No respiratory distress  Breath sounds: Normal breath sounds  No stridor  Abdominal:      Tenderness:  There is no right CVA tenderness or left CVA tenderness  Lymphadenopathy:      Cervical: No cervical adenopathy  Skin:     General: Skin is warm and dry  Capillary Refill: Capillary refill takes less than 2 seconds  Findings: No rash  Neurological:      Mental Status: She is alert and oriented to person, place, and time  Cranial Nerves: No cranial nerve deficit  Sensory: No sensory deficit  Psychiatric:         Behavior: Behavior normal          Thought Content:  Thought content normal

## 2021-05-11 LAB — BACTERIA UR CULT: ABNORMAL

## 2021-07-29 ENCOUNTER — OFFICE VISIT (OUTPATIENT)
Dept: URGENT CARE | Facility: CLINIC | Age: 46
End: 2021-07-29
Payer: COMMERCIAL

## 2021-07-29 VITALS
OXYGEN SATURATION: 98 % | HEART RATE: 61 BPM | TEMPERATURE: 96.8 F | DIASTOLIC BLOOD PRESSURE: 70 MMHG | BODY MASS INDEX: 23.9 KG/M2 | HEIGHT: 64 IN | SYSTOLIC BLOOD PRESSURE: 110 MMHG | WEIGHT: 140 LBS | RESPIRATION RATE: 20 BRPM

## 2021-07-29 DIAGNOSIS — R30.0 DYSURIA: Primary | ICD-10-CM

## 2021-07-29 LAB
SL AMB  POCT GLUCOSE, UA: ABNORMAL
SL AMB LEUKOCYTE ESTERASE,UA: ABNORMAL
SL AMB POCT BILIRUBIN,UA: ABNORMAL
SL AMB POCT BLOOD,UA: ABNORMAL
SL AMB POCT CLARITY,UA: ABNORMAL
SL AMB POCT COLOR,UA: ABNORMAL
SL AMB POCT KETONES,UA: ABNORMAL
SL AMB POCT NITRITE,UA: ABNORMAL
SL AMB POCT PH,UA: 5
SL AMB POCT SPECIFIC GRAVITY,UA: 1.01
SL AMB POCT URINE PROTEIN: ABNORMAL
SL AMB POCT UROBILINOGEN: 0.2

## 2021-07-29 PROCEDURE — 81002 URINALYSIS NONAUTO W/O SCOPE: CPT | Performed by: PHYSICIAN ASSISTANT

## 2021-07-29 PROCEDURE — G0382 LEV 3 HOSP TYPE B ED VISIT: HCPCS | Performed by: PHYSICIAN ASSISTANT

## 2021-07-29 PROCEDURE — S9083 URGENT CARE CENTER GLOBAL: HCPCS | Performed by: PHYSICIAN ASSISTANT

## 2021-07-29 PROCEDURE — 87086 URINE CULTURE/COLONY COUNT: CPT | Performed by: PHYSICIAN ASSISTANT

## 2021-07-29 RX ORDER — CIPROFLOXACIN 500 MG/1
500 TABLET, FILM COATED ORAL EVERY 12 HOURS SCHEDULED
Qty: 14 TABLET | Refills: 0 | Status: SHIPPED | OUTPATIENT
Start: 2021-07-29 | End: 2021-08-05

## 2021-07-29 NOTE — PROGRESS NOTES
[facility]        NAME: Idalia Johnson is a 48 y o  female  : 1975    MRN: [id number]  DATE:  2021  TIME: 9:29 AM    Assessment and Plan   Dysuria [R30 0]  1  Dysuria  POCT urine dip    Urine culture    ciprofloxacin (CIPRO) 500 mg tablet         Patient Instructions     Discussed symptoms and UA results with pt  I suspect an acute uncomplicated UTI  Will start pt on an oral abx and send out sample for culture to further evaluate  I rec increased hydration, rest, and observation  Should be re-evaluated if condition persists or worsens  Follow up with PCP in 3-5 days  Proceed to  ER if symptoms worsen  Chief Complaint     Chief Complaint   Patient presents with    Possible UTI     dysuria since last night, frequency          History of Present Illness         Patient presents with 1 day history of dysuria, frequency, urgency  Denies flank pain, fever, chills, N/ V, vaginal discharge  She has been hydrating  Review of Systems   Review of Systems   Constitutional: Negative  Respiratory: Negative  Cardiovascular: Negative  Gastrointestinal: Negative  Genitourinary: Positive for dysuria, frequency and urgency  Negative for flank pain, hematuria and vaginal discharge           Current Medications       Current Outpatient Medications:     BIOTIN PO, Take by mouth daily, Disp: , Rfl:     Cholecalciferol (VITAMIN D3 PO), Take by mouth daily, Disp: , Rfl:     Cranberry 1000 MG CAPS, Take by mouth daily, Disp: , Rfl:     FOLIC ACID PO, Take by mouth daily, Disp: , Rfl:     loratadine (CLARITIN) 10 mg tablet, Take 10 mg by mouth daily in the early morning , Disp: , Rfl:     MILK THISTLE PO, Take by mouth daily, Disp: , Rfl:     phenazopyridine (PYRIDIUM) 100 mg tablet, Take 1 tablet (100 mg total) by mouth 3 (three) times a day as needed for bladder spasms, Disp: 15 tablet, Rfl: 0    ciprofloxacin (CIPRO) 500 mg tablet, Take 1 tablet (500 mg total) by mouth every 12 (twelve) hours for 7 days, Disp: 14 tablet, Rfl: 0    estradiol (VIVELLE-DOT) 0 075 MG/24HR, Place 1 patch on the skin 2 (two) times a week, Disp: 24 patch, Rfl: 3    pseudoephedrine-guaifenesin (MUCINEX D)  MG per tablet, Take 1 tablet by mouth as needed  (Patient not taking: Reported on 7/29/2021), Disp: , Rfl:     Current Allergies     Allergies as of 07/29/2021 - Reviewed 07/29/2021   Allergen Reaction Noted    Soybean oil - food allergy GI Intolerance 01/12/2021            The following portions of the patient's history were reviewed and updated as appropriate: allergies, current medications, past family history, past medical history, past social history, past surgical history and problem list      Past Medical History:   Diagnosis Date    Gastritis     Age 19    GERD (gastroesophageal reflux disease)     Heart murmur        Past Surgical History:   Procedure Laterality Date    EGD      LAPAROSCOPIC CHOLECYSTECTOMY  1992    NC LAPAROSCOPY W TOT HYSTERECTUTERUS <=250 GRAM  W TUBE/OVARY N/A 2/4/2021    Procedure: ROBOTIC TOTAL LAPAROSCOPIC HYSTERECTOMY, BILATERAL SALPINGO-OOPHORECTOMY;  Surgeon: Usha Elena MD;  [facility];  Service: Gynecology Oncology    WISDOM TOOTH EXTRACTION  1991       Family History   Problem Relation Age of Onset    Tongue cancer Mother 76    No Known Problems Father     No Known Problems Sister     No Known Problems Daughter     No Known Problems Maternal Grandmother     No Known Problems Maternal Grandfather     No Known Problems Paternal Grandmother     No Known Problems Paternal Grandfather     Ovarian cancer Cousin 48    No Known Problems Son     No Known Problems Son     No Known Problems Sister     No Known Problems Paternal Aunt     No Known Problems Paternal Aunt     No Known Problems Paternal Aunt     No Known Problems Paternal Aunt     No Known Problems Paternal Aunt          Medications have been verified          Objective   BP 110/70   Pulse 61   Temp (!) 96 8 °F (36 °C) (Tympanic)   Resp 20   Ht 5' 4" (1 626 m)   Wt 63 5 kg (140 lb)   SpO2 98%   BMI 24 03 kg/m²   No LMP recorded  Physical Exam     Physical Exam  Vitals reviewed  Constitutional:       General: She is not in acute distress  Appearance: She is well-developed  HENT:      Mouth/Throat:      Mouth: Mucous membranes are moist       Pharynx: Oropharynx is clear  Cardiovascular:      Rate and Rhythm: Normal rate and regular rhythm  Pulses: Normal pulses  Heart sounds: Normal heart sounds  No murmur heard  Pulmonary:      Effort: Pulmonary effort is normal  No respiratory distress  Breath sounds: Normal breath sounds  Abdominal:      Tenderness: There is no right CVA tenderness or left CVA tenderness  Neurological:      Mental Status: She is alert and oriented to person, place, and time

## 2021-07-29 NOTE — PATIENT INSTRUCTIONS
Urinary Tract Infection in Women   WHAT YOU NEED TO KNOW:   A urinary tract infection (UTI) is caused by bacteria that get inside your urinary tract  Most bacteria that enter your urinary tract come out when you urinate  If the bacteria stay in your urinary tract, you may get an infection  Your urinary tract includes your kidneys, ureters, bladder, and urethra  Urine is made in your kidneys, and it flows from the ureters to the bladder  Urine leaves the bladder through the urethra  A UTI is more common in your lower urinary tract, which includes your bladder and urethra  DISCHARGE INSTRUCTIONS:   Return to the emergency department if:   · You are urinating very little or not at all  · You have a high fever with shaking chills  · You have side or back pain that gets worse  Call your doctor if:   · You have a fever  · You do not feel better after 2 days of taking antibiotics  · You are vomiting  · You have questions or concerns about your condition or care  Medicines:   · Antibiotics  help fight a bacterial infection  If you have UTIs often (called recurrent UTIs), you may be given antibiotics to take regularly  You will be given directions for when and how to use antibiotics  The goal is to prevent UTIs but not cause antibiotic resistance by using antibiotics too often  · Medicines  may be given to decrease pain and burning when you urinate  They will also help decrease the feeling that you need to urinate often  These medicines will make your urine orange or red  · Take your medicine as directed  Contact your healthcare provider if you think your medicine is not helping or if you have side effects  Tell him or her if you are allergic to any medicine  Keep a list of the medicines, vitamins, and herbs you take  Include the amounts, and when and why you take them  Bring the list or the pill bottles to follow-up visits   Carry your medicine list with you in case of an emergency  Follow up with your healthcare provider as directed:  Write down your questions so you remember to ask them during your visits  Prevent another UTI:   · Empty your bladder often  Urinate and empty your bladder as soon as you feel the need  Do not hold your urine for long periods of time  · Wipe from front to back after you urinate or have a bowel movement  This will help prevent germs from getting into your urinary tract through your urethra  · Drink liquids as directed  Ask how much liquid to drink each day and which liquids are best for you  You may need to drink more liquids than usual to help flush out the bacteria  Do not drink alcohol, caffeine, or citrus juices  These can irritate your bladder and increase your symptoms  Your healthcare provider may recommend cranberry juice to help prevent a UTI  · Urinate after you have sex  This can help flush out bacteria passed during sex  · Do not douche or use feminine deodorants  These can change the chemical balance in your vagina  · Change sanitary pads or tampons often  This will help prevent germs from getting into your urinary tract  · Talk to your healthcare provider about your birth control method  You may need to change your method if it is increasing your risk for UTIs  · Wear cotton underwear and clothes that are loose  Tight pants and nylon underwear can trap moisture and cause bacteria to grow  · Vaginal estrogen may be recommended  This medicine helps prevent UTIs in women who have gone through menopause or are in marleni-menopause  · Do pelvic muscle exercises often  Pelvic muscle exercises may help you start and stop urinating  Strong pelvic muscles may help you empty your bladder easier  Squeeze these muscles tightly for 5 seconds like you are trying to hold back urine  Then relax for 5 seconds  Gradually work up to squeezing for 10 seconds  Do 3 sets of 15 repetitions a day, or as directed      © [facility] 2021 Information is for End User's use only and may not be sold, redistributed or otherwise used for commercial purposes  All illustrations and images included in CareNotes® are the copyrighted property of A D A M , Inc  or [employer]   The above information is an  only  It is not intended as medical advice for individual conditions or treatments  Talk to your doctor, nurse or pharmacist before following any medical regimen to see if it is safe and effective for you

## 2021-07-31 ENCOUNTER — HOSPITAL ENCOUNTER (EMERGENCY)
Facility: HOSPITAL | Age: 46
Discharge: HOME | End: 2021-07-31
Attending: EMERGENCY MEDICINE | Admitting: EMERGENCY MEDICINE
Payer: COMMERCIAL

## 2021-07-31 ENCOUNTER — APPOINTMENT (EMERGENCY)
Dept: RADIOLOGY | Facility: HOSPITAL | Age: 46
End: 2021-07-31
Payer: COMMERCIAL

## 2021-07-31 VITALS
BODY MASS INDEX: 23.17 KG/M2 | RESPIRATION RATE: 20 BRPM | TEMPERATURE: 98.1 F | DIASTOLIC BLOOD PRESSURE: 73 MMHG | HEART RATE: 59 BPM | WEIGHT: 135 LBS | SYSTOLIC BLOOD PRESSURE: 132 MMHG | OXYGEN SATURATION: 100 %

## 2021-07-31 DIAGNOSIS — R00.2 PALPITATIONS: Primary | ICD-10-CM

## 2021-07-31 LAB
ALBUMIN SERPL BCP-MCNC: 3.7 G/DL (ref 3.5–5)
ALP SERPL-CCNC: 69 U/L (ref 46–116)
ALT SERPL W P-5'-P-CCNC: 23 U/L (ref 12–78)
ANION GAP SERPL CALCULATED.3IONS-SCNC: 4 MMOL/L (ref 4–13)
AST SERPL W P-5'-P-CCNC: 13 U/L (ref 5–45)
BACTERIA UR CULT: NORMAL
BASOPHILS # BLD AUTO: 0.06 THOUSANDS/ΜL (ref 0–0.1)
BASOPHILS NFR BLD AUTO: 1 % (ref 0–1)
BILIRUB SERPL-MCNC: 0.5 MG/DL (ref 0.2–1)
BUN SERPL-MCNC: 11 MG/DL (ref 5–25)
CALCIUM SERPL-MCNC: 9 MG/DL (ref 8.3–10.1)
CHLORIDE SERPL-SCNC: 108 MMOL/L (ref 100–108)
CO2 SERPL-SCNC: 26 MMOL/L (ref 21–32)
CREAT SERPL-MCNC: 0.67 MG/DL (ref 0.6–1.3)
EOSINOPHIL # BLD AUTO: 0.32 THOUSAND/ΜL (ref 0–0.61)
EOSINOPHIL NFR BLD AUTO: 4 % (ref 0–6)
ERYTHROCYTE [DISTWIDTH] IN BLOOD BY AUTOMATED COUNT: 12.5 % (ref 11.6–15.1)
GFR SERPL CREATININE-BSD FRML MDRD: 104 ML/MIN/1.73SQ M
GLUCOSE SERPL-MCNC: 88 MG/DL (ref 65–140)
HCT VFR BLD AUTO: 39.7 % (ref 34.8–46.1)
HGB BLD-MCNC: 13.4 G/DL (ref 11.5–15.4)
IMM GRANULOCYTES # BLD AUTO: 0.02 THOUSAND/UL (ref 0–0.2)
IMM GRANULOCYTES NFR BLD AUTO: 0 % (ref 0–2)
LYMPHOCYTES # BLD AUTO: 2.35 THOUSANDS/ΜL (ref 0.6–4.47)
LYMPHOCYTES NFR BLD AUTO: 31 % (ref 14–44)
MAGNESIUM SERPL-MCNC: 2.1 MG/DL (ref 1.6–2.6)
MCH RBC QN AUTO: 32.2 PG (ref 26.8–34.3)
MCHC RBC AUTO-ENTMCNC: 33.8 G/DL (ref 31.4–37.4)
MCV RBC AUTO: 95 FL (ref 82–98)
MONOCYTES # BLD AUTO: 0.64 THOUSAND/ΜL (ref 0.17–1.22)
MONOCYTES NFR BLD AUTO: 9 % (ref 4–12)
NEUTROPHILS # BLD AUTO: 4.12 THOUSANDS/ΜL (ref 1.85–7.62)
NEUTS SEG NFR BLD AUTO: 55 % (ref 43–75)
NRBC BLD AUTO-RTO: 0 /100 WBCS
PLATELET # BLD AUTO: 245 THOUSANDS/UL (ref 149–390)
PMV BLD AUTO: 9.6 FL (ref 8.9–12.7)
POTASSIUM SERPL-SCNC: 4.1 MMOL/L (ref 3.5–5.3)
PROT SERPL-MCNC: 7.2 G/DL (ref 6.4–8.2)
RBC # BLD AUTO: 4.16 MILLION/UL (ref 3.81–5.12)
SODIUM SERPL-SCNC: 138 MMOL/L (ref 136–145)
TROPONIN I SERPL-MCNC: <0.02 NG/ML
TSH SERPL DL<=0.05 MIU/L-ACNC: 1.4 UIU/ML (ref 0.36–3.74)
WBC # BLD AUTO: 7.51 THOUSAND/UL (ref 4.31–10.16)

## 2021-07-31 PROCEDURE — 84443 ASSAY THYROID STIM HORMONE: CPT | Performed by: EMERGENCY MEDICINE

## 2021-07-31 PROCEDURE — 36415 COLL VENOUS BLD VENIPUNCTURE: CPT | Performed by: EMERGENCY MEDICINE

## 2021-07-31 PROCEDURE — 99285 EMERGENCY DEPT VISIT HI MDM: CPT

## 2021-07-31 PROCEDURE — 93005 ELECTROCARDIOGRAM TRACING: CPT

## 2021-07-31 PROCEDURE — 84484 ASSAY OF TROPONIN QUANT: CPT | Performed by: EMERGENCY MEDICINE

## 2021-07-31 PROCEDURE — 83735 ASSAY OF MAGNESIUM: CPT | Performed by: EMERGENCY MEDICINE

## 2021-07-31 PROCEDURE — 71046 X-RAY EXAM CHEST 2 VIEWS: CPT

## 2021-07-31 PROCEDURE — 99285 EMERGENCY DEPT VISIT HI MDM: CPT | Performed by: EMERGENCY MEDICINE

## 2021-07-31 PROCEDURE — 80053 COMPREHEN METABOLIC PANEL: CPT | Performed by: EMERGENCY MEDICINE

## 2021-07-31 PROCEDURE — 85025 COMPLETE CBC W/AUTO DIFF WBC: CPT | Performed by: EMERGENCY MEDICINE

## 2021-07-31 NOTE — ED ATTENDING ATTESTATION
7/31/2021  Valdez Walsh DO, saw and evaluated the patient  I have discussed the patient with the resident/non-physician practitioner and agree with the resident's/non-physician practitioner's findings, Plan of Care, and MDM as documented in the resident's/non-physician practitioner's note, except where noted  All available labs and Radiology studies were reviewed  I was present for key portions of any procedure(s) performed by the resident/non-physician practitioner and I was immediately available to provide assistance  At this point I agree with the current assessment done in the Emergency Department  I have conducted an independent evaluation of this patient a history and physical is as follows:    ED Course     48 yof with intermittent palpitations for the last few weeks  Wakes her up sometimes  Patient reports being stressed from planning her vacation in the [facility]  Currently asymptomatic but she felt it again during the day a few days ago  Denies SOB, fever cough  Exam: NAD, normal WOB, RRR, abd non distended  Able to ambulate without difficulty  Grossly normal neuro exam     A/p: 48 yof with intermittent palpitations  Cardiac evaluation labs CXR  FU PCP         Critical Care Time  Procedures

## 2021-07-31 NOTE — Clinical Note
Apolol Richeysaul was seen and treated in our emergency department on 7/31/2021  Diagnosis:     Allison    She may return on this date: If you have any questions or concerns, please don't hesitate to call        Aminata Alston MD    ______________________________           _______________          _______________  Hospital Representative                              Date                                Time

## 2021-07-31 NOTE — Clinical Note
Mamadou Mohr was seen and treated in our emergency department on 7/31/2021  Diagnosis:     Allison    She may return on this date: If you have any questions or concerns, please don't hesitate to call        Poli Hadley MD    ______________________________           _______________          _______________  Hospital Representative                              Date                                Time

## 2021-07-31 NOTE — ED PROVIDER NOTES
History  Chief Complaint   Patient presents with    Irregular Heart Beat     for last 3 weeks at times has woken up at 0300 with pounding feeling in her chest, for the last year has had pain in left neck/shoulder pain  pt felt is was possible her nerves but wants to be eval  due to family hx of heart issues  49 yo F presenting for 3 weeks of "arrythmia", wakes her up from sleeping  Usually goes away but yesterday had it during the afternoon  Pounding and skipping beats  Pain in left side of her neck for year  At times feeling like she "cant breathe" and like it's chocking her  Never seen a doc about this before  Left lower breast/chest wall pain coincides with the palpitations  No SOB  Increased fatigue  Just one cup of coffee in the morning  No n/v/d  No abdominal pain  Dad had afib, son has SVT  No other pertinent FHx  History provided by:  Patient   used: No        Prior to Admission Medications   Prescriptions Last Dose Informant Patient Reported? Taking?    BIOTIN PO  Self Yes No   Sig: Take by mouth daily   Cholecalciferol (VITAMIN D3 PO)  Self Yes No   Sig: Take by mouth daily   Cranberry 1000 MG CAPS   Yes No   Sig: Take by mouth daily   FOLIC ACID PO  Self Yes No   Sig: Take by mouth daily   MILK THISTLE PO  Self Yes No   Sig: Take by mouth daily   ciprofloxacin (CIPRO) 500 mg tablet   No No   Sig: Take 1 tablet (500 mg total) by mouth every 12 (twelve) hours for 7 days   estradiol (VIVELLE-DOT) 0 075 MG/24HR  Self No No   Sig: Place 1 patch on the skin 2 (two) times a week   loratadine (CLARITIN) 10 mg tablet  Self Yes No   Sig: Take 10 mg by mouth daily in the early morning    phenazopyridine (PYRIDIUM) 100 mg tablet   No No   Sig: Take 1 tablet (100 mg total) by mouth 3 (three) times a day as needed for bladder spasms   pseudoephedrine-guaifenesin (MUCINEX D)  MG per tablet  Self Yes No   Sig: Take 1 tablet by mouth as needed    Patient not taking: Reported on 7/29/2021      Facility-Administered Medications: None       Past Medical History:   Diagnosis Date    Gastritis     Age 19    GERD (gastroesophageal reflux disease)     Heart murmur        Past Surgical History:   Procedure Laterality Date    EGD      LAPAROSCOPIC CHOLECYSTECTOMY  1992    IA LAPAROSCOPY W TOT HYSTERECTUTERUS <=250 GRAM  W TUBE/OVARY N/A 2/4/2021    Procedure: ROBOTIC TOTAL LAPAROSCOPIC HYSTERECTOMY, BILATERAL SALPINGO-OOPHORECTOMY;  Surgeon: Colonel Daria MD;  [facility];  Service: Gynecology Oncology    WISDOM TOOTH EXTRACTION  1991       Family History   Problem Relation Age of Onset    Tongue cancer Mother 76    No Known Problems Father     No Known Problems Sister     No Known Problems Daughter     No Known Problems Maternal Grandmother     No Known Problems Maternal Grandfather     No Known Problems Paternal Grandmother     No Known Problems Paternal Grandfather     Ovarian cancer Cousin 48    No Known Problems Son     No Known Problems Son     No Known Problems Sister     No Known Problems Paternal Aunt     No Known Problems Paternal Aunt     No Known Problems Paternal Aunt     No Known Problems Paternal Aunt     No Known Problems Paternal Aunt      I have reviewed and agree with the history as documented  E-Cigarette/Vaping    E-Cigarette Use Never User      E-Cigarette/Vaping Substances    Nicotine No     THC No     CBD No     Flavoring No     Other No     Unknown No      Social History     Tobacco Use    Smoking status: Former Smoker     Quit date: 2006     Years since quitting: 15 5    Smokeless tobacco: Never Used   Vaping Use    Vaping Use: Never used   Substance Use Topics    Alcohol use: Yes     Alcohol/week: 10 0 standard drinks     Types: 9 Glasses of wine, 1 Cans of beer per week     Comment: daily     Drug use: No        Review of Systems   Constitutional: Negative for chills and fever  HENT: Negative for ear pain and sore throat  Eyes: Negative for pain and visual disturbance  Respiratory: Negative for cough and shortness of breath  Cardiovascular: Positive for palpitations  Negative for chest pain  Gastrointestinal: Negative for abdominal pain, diarrhea and vomiting  Genitourinary: Negative for dysuria and hematuria  Musculoskeletal: Negative for arthralgias and back pain  Skin: Negative for color change and rash  Neurological: Negative for syncope and headaches  Psychiatric/Behavioral: The patient is nervous/anxious  All other systems reviewed and are negative  Physical Exam  ED Triage Vitals   Temperature Pulse Respirations Blood Pressure SpO2   07/31/21 1138 07/31/21 1138 07/31/21 1138 07/31/21 1138 07/31/21 1138   98 1 °F (36 7 °C) 75 18 125/95 100 %      Temp src Heart Rate Source Patient Position - Orthostatic VS BP Location FiO2 (%)   -- 07/31/21 1639 07/31/21 1639 07/31/21 1639 --    Monitor Sitting Right arm       Pain Score       07/31/21 1138       No Pain             Orthostatic Vital Signs  Vitals:    07/31/21 1138 07/31/21 1639   BP: 125/95 132/73   Pulse: 75 59   Patient Position - Orthostatic VS:  Sitting       Physical Exam  Vitals and nursing note reviewed  Constitutional:       General: She is not in acute distress  Appearance: Normal appearance  She is well-developed  She is not ill-appearing or diaphoretic  HENT:      Head: Normocephalic and atraumatic  Right Ear: External ear normal       Left Ear: External ear normal       Nose: Nose normal       Mouth/Throat:      Mouth: Mucous membranes are moist       Pharynx: Oropharynx is clear  Eyes:      Conjunctiva/sclera: Conjunctivae normal    Cardiovascular:      Rate and Rhythm: Normal rate and regular rhythm  Heart sounds: No murmur heard  Pulmonary:      Effort: Pulmonary effort is normal  No respiratory distress  Breath sounds: Normal breath sounds  No wheezing or rales  Abdominal:      General: Abdomen is flat  There is no distension  Palpations: Abdomen is soft  Tenderness: There is no abdominal tenderness  Musculoskeletal:         General: Normal range of motion  Cervical back: Normal range of motion and neck supple  Right lower leg: No edema  Left lower leg: No edema  Skin:     General: Skin is warm and dry  Neurological:      General: No focal deficit present  Mental Status: She is alert  Psychiatric:         Mood and Affect: Mood normal          ED Medications  Medications - No data to display    Diagnostic Studies  Results Reviewed     Procedure Component Value Units Date/Time    TSH, 3rd generation with Free T4 reflex [[id number]]  (Normal) Collected: 07/31/21 1544    Lab Status: Final result Specimen: Blood from Arm, Left Updated: 07/31/21 1622     TSH 3RD GENERATON 1 400 uIU/mL     Narrative:      Patients undergoing fluorescein dye angiography may retain small amounts of fluorescein in the body for 48-72 hours post procedure  Samples containing fluorescein can produce falsely depressed TSH values  If the patient had this procedure,a specimen should be resubmitted post fluorescein clearance        Comprehensive metabolic panel [[id number]] Collected: 07/31/21 1544    Lab Status: Final result Specimen: Blood from Arm, Left Updated: 07/31/21 1615     Sodium 138 mmol/L      Potassium 4 1 mmol/L      Chloride 108 mmol/L      CO2 26 mmol/L      ANION GAP 4 mmol/L      BUN 11 mg/dL      Creatinine 0 67 mg/dL      Glucose 88 mg/dL      Calcium 9 0 mg/dL      AST 13 U/L      ALT 23 U/L      Alkaline Phosphatase 69 U/L      Total Protein 7 2 g/dL      Albumin 3 7 g/dL      Total Bilirubin 0 50 mg/dL      eGFR 104 ml/min/1 73sq m     Narrative:      [place] guidelines for Chronic Kidney Disease (CKD):     Stage 1 with normal or high GFR (GFR > 90 mL/min/1 73 square meters)    Stage 2 Mild CKD (GFR = 60-89 mL/min/1 73 square meters)    Stage 3A Moderate CKD (GFR = 45-59 mL/min/1 73 square meters)    Stage 3B Moderate CKD (GFR = 30-44 mL/min/1 73 square meters)    Stage 4 Severe CKD (GFR = 15-29 mL/min/1 73 square meters)    Stage 5 End Stage CKD (GFR <15 mL/min/1 73 square meters)  Note: GFR calculation is accurate only with a steady state creatinine    Magnesium [[id number]]  (Normal) Collected: 07/31/21 1544    Lab Status: Final result Specimen: Blood from Arm, Left Updated: 07/31/21 1615     Magnesium 2 1 mg/dL     Troponin I [[id number]]  (Normal) Collected: 07/31/21 1544    Lab Status: Final result Specimen: Blood from Arm, Left Updated: 07/31/21 1613     Troponin I <0 02 ng/mL     CBC and differential [[id number]] Collected: 07/31/21 1544    Lab Status: Final result Specimen: Blood from Arm, Left Updated: 07/31/21 1551     WBC 7 51 Thousand/uL      RBC 4 16 Million/uL      Hemoglobin 13 4 g/dL      Hematocrit 39 7 %      MCV 95 fL      MCH 32 2 pg      MCHC 33 8 g/dL      RDW 12 5 %      MPV 9 6 fL      Platelets 400 Thousands/uL      nRBC 0 /100 WBCs      Neutrophils Relative 55 %      Immat GRANS % 0 %      Lymphocytes Relative 31 %      Monocytes Relative 9 %      Eosinophils Relative 4 %      Basophils Relative 1 %      Neutrophils Absolute 4 12 Thousands/µL      Immature Grans Absolute 0 02 Thousand/uL      Lymphocytes Absolute 2 35 Thousands/µL      Monocytes Absolute 0 64 Thousand/µL      Eosinophils Absolute 0 32 Thousand/µL      Basophils Absolute 0 06 Thousands/µL                  XR chest 2 views   ED Interpretation by Jason Mariscal MD (07/31 1628)   No acute CP process       Final Result by Sara Waddell MD (08/01 1428)      No acute cardiopulmonary disease                    Workstation performed: [id number]               Procedures  ECG 12 Lead Documentation Only    Date/Time: 7/31/2021 3:30 PM  Performed by: Jason Mariscal MD  Authorized by: Jason Mariscal MD     Indications / Diagnosis:  Palpitations  Patient location:  ED  Previous ECG: Previous ECG:  Compared to current    Similarity:  No change  Interpretation:     Interpretation: normal    Rate:     ECG rate assessment: bradycardic    Rhythm:     Rhythm: sinus rhythm    Ectopy:     Ectopy: none    QRS:     QRS axis:  Normal  Conduction:     Conduction: normal    ST segments:     ST segments:  Normal  T waves:     T waves: normal    Comments:      Sinus emily          ED Course                                       MDM  Number of Diagnoses or Management Options  Palpitations  Diagnosis management comments: 47 yo F presenting for evaluation of palpitations and feelings of skipping beats, mostly at rest/night  Workup today unremarkable  No clear source of her symptoms, suspect sinus arrhythmia vs pvcs  Recommended f/u with her PCP for further workup and evaluation as well as symptom management  Will avoid caffeine and stressors at home  Discharged  Disposition  Final diagnoses:   Palpitations     Time reflects when diagnosis was documented in both MDM as applicable and the Disposition within this note     Time User Action Codes Description Comment    7/31/2021  4:32 PM Estrella Brambila Add [R00 2] Palpitations       ED Disposition     ED Disposition Condition Date/Time Comment    Discharge Good Sat Jul 31, 2021  4:32 PM Lior Johansen discharge to home/self care  Follow-up Information     Follow up With Specialties Details Why Contact Info Additional Raheel Naz[address] Family Medicine Schedule an appointment as soon as possible for a visit in 1 week For reevaluation as we discussed   [facility]  [phone]       [address] [place] [address] Emergency Department Emergency Medicine Go to  As needed Jannette [phone]  [address] [facility] Emergency Department, [address], [place], [place], [address] [facility] [address] Marisol Bose Cardiology Schedule an appointment as soon as possible for a visit  As needed Mark [phone]  [address] [facility] [address] [facility] [address] Kimberlee Bangura, [address] [place] [address], [id number] [phone]          Discharge Medication List as of 7/31/2021  4:33 PM      CONTINUE these medications which have NOT CHANGED    Details   BIOTIN PO Take by mouth daily, Historical Med      Cholecalciferol (VITAMIN D3 PO) Take by mouth daily, Historical Med      ciprofloxacin (CIPRO) 500 mg tablet Take 1 tablet (500 mg total) by mouth every 12 (twelve) hours for 7 days, Starting Thu 7/29/2021, Until Thu 8/5/2021, Normal      Cranberry 1000 MG CAPS Take by mouth daily, Historical Med      estradiol (VIVELLE-DOT) 0 075 MG/24HR Place 1 patch on the skin 2 (two) times a week, Starting Thu 5/9/0170, Normal      FOLIC ACID PO Take by mouth daily, Historical Med      loratadine (CLARITIN) 10 mg tablet Take 10 mg by mouth daily in the early morning , Historical Med      MILK THISTLE PO Take by mouth daily, Historical Med      phenazopyridine (PYRIDIUM) 100 mg tablet Take 1 tablet (100 mg total) by mouth 3 (three) times a day as needed for bladder spasms, Starting Sat 5/8/2021, Normal      pseudoephedrine-guaifenesin (MUCINEX D)  MG per tablet Take 1 tablet by mouth as needed , Historical Med           No discharge procedures on file  PDMP Review       Value Time User    PDMP Reviewed  Yes 2/4/2021 11:53 AM Michelle Grayson MD           ED Provider  Attending physically available and evaluated Twan Patten  I managed the patient along with the ED Attending      Electronically Signed by         Isabell Prince MD  08/03/21 1161

## 2021-08-01 LAB
ATRIAL RATE: 59 BPM
P AXIS: 74 DEGREES
PR INTERVAL: 164 MS
QRS AXIS: 80 DEGREES
QRSD INTERVAL: 80 MS
QT INTERVAL: 456 MS
QTC INTERVAL: 451 MS
T WAVE AXIS: 71 DEGREES
VENTRICULAR RATE: 59 BPM

## 2021-08-01 PROCEDURE — 93010 ELECTROCARDIOGRAM REPORT: CPT | Performed by: INTERNAL MEDICINE

## 2021-08-03 ENCOUNTER — TELEPHONE (OUTPATIENT)
Dept: OBGYN CLINIC | Facility: CLINIC | Age: 46
End: 2021-08-03

## 2021-08-03 NOTE — TELEPHONE ENCOUNTER
Recommend follow-up with cardiology as requested  Doubt low-dose estrogen is causing these symptoms  She could potentially discontinue the patch for 4 weeks and see if she notices a difference, likely will have hot flashes  Please let me know which she would like to do    Thank you

## 2021-08-03 NOTE — TELEPHONE ENCOUNTER
Patient recently seen in ED for heart palpitations, she is questioning if the dosage of estrogen she is taking could possibly be affecting her heart and causing an arrhythmia?

## 2021-08-03 NOTE — TELEPHONE ENCOUNTER
Pt has been experiencing palpitations past month @ night, waking her up, initially thought stress related then was still experiencing on vacation,  Noticed same scenario during day on 7/30/2021 & seen in ED 7/3/1/2021 - workup negative, told PVCs  Was placed on Vivelle Dot 0 075 mg/24 hr by Dr Yolanda Esteban in 2/2021 & did not experience sx at that time  States it has decreased & occas feels irregularity or pounding with heart rate but has slept well past 2 nights  recom by ED was for her to f/u with cardiology

## 2021-08-16 ENCOUNTER — OFFICE VISIT (OUTPATIENT)
Dept: CARDIOLOGY CLINIC | Facility: CLINIC | Age: 46
End: 2021-08-16
Payer: COMMERCIAL

## 2021-08-16 VITALS
HEART RATE: 89 BPM | BODY MASS INDEX: 23.76 KG/M2 | OXYGEN SATURATION: 98 % | DIASTOLIC BLOOD PRESSURE: 72 MMHG | HEIGHT: 64 IN | WEIGHT: 139.2 LBS | SYSTOLIC BLOOD PRESSURE: 108 MMHG

## 2021-08-16 DIAGNOSIS — R01.1 HEART MURMUR: ICD-10-CM

## 2021-08-16 DIAGNOSIS — R00.2 PALPITATION: Primary | ICD-10-CM

## 2021-08-16 PROCEDURE — 3008F BODY MASS INDEX DOCD: CPT | Performed by: INTERNAL MEDICINE

## 2021-08-16 PROCEDURE — 1036F TOBACCO NON-USER: CPT | Performed by: INTERNAL MEDICINE

## 2021-08-16 PROCEDURE — 99204 OFFICE O/P NEW MOD 45 MIN: CPT | Performed by: INTERNAL MEDICINE

## 2021-08-16 NOTE — PROGRESS NOTES
Assessment/Plan:    Palpitation    The patient has symptoms of palpitation of unknown etiology  This is not associated with any symptoms of the dizziness or syncope  I will be arranging for the patient to have a one-week extended monitor as well as an echocardiogram and I will arrange for a regular exercise stress test     Heart murmur    Patient has a faint systolic murmur  I will arrange for echocardiogram as mentioned above  Diagnoses and all orders for this visit:    Palpitation  -     Lipid Panel with Direct LDL reflex; Future  -     Stress test only, exercise; Future  -     Echo complete with contrast if indicated; Future  -     AMB extended holter monitor; Future    Heart murmur  -     Lipid Panel with Direct LDL reflex; Future  -     Stress test only, exercise; Future  -     Echo complete with contrast if indicated; Future  -     AMB extended holter monitor; Future          Subjective:   Symptoms of palpitation  Patient ID: Corey Miller is a 48 y o  female  Patient presented to this office for the purpose cardiac evaluation consultation  Approximately weeks patient was experiencing symptoms skipping heartbeats generally she was quiet  This apparently not associated any symptoms chest pain shortness of symptoms lightheadedness she never experienced syncopal episodes  The patient is physically active able walk experiencing discomfort  Patient denies any history hypertension, diabetes or hyperlipidemia  The patient was told as a child that she has a simple murmur  She was smoking past 16 she has 2-3 drinks daily  Did have hysterectomy oophorectomy has been estrogen patch  There was a question as to whether the symptoms correlated the initiation this therapy but that time relationship is not clear family history significant for her father dying of complication cerebral bleed at the age of 68  Her mother  of complication of cancer at the age of 76        The following portions of the patient's history were reviewed and updated as appropriate: allergies, current medications, past family history, past medical history, past social history, past surgical history and problem list     Review of Systems   Respiratory: Negative for apnea, cough, chest tightness, shortness of breath and wheezing  Cardiovascular: Positive for palpitations  Negative for chest pain and leg swelling  Gastrointestinal: Negative for abdominal pain  Neurological: Negative for dizziness and light-headedness  Psychiatric/Behavioral: Negative  Objective:  Stable cardiac-wise  /72 (BP Location: Left arm, Patient Position: Sitting, Cuff Size: Adult)   Pulse 89   Ht 5' 4" (1 626 m)   Wt 63 1 kg (139 lb 3 2 oz)   SpO2 98%   BMI 23 89 kg/m²          Physical Exam  Vitals reviewed  Constitutional:       General: She is not in acute distress  Appearance: She is well-developed  She is not diaphoretic  HENT:      Head: Normocephalic and atraumatic  Neck:      Thyroid: No thyromegaly  Vascular: No JVD  Cardiovascular:      Rate and Rhythm: Normal rate and regular rhythm  Heart sounds: S1 normal and S2 normal  Murmur heard  Systolic murmur is present with a grade of 1/6  No friction rub  No gallop  Pulmonary:      Effort: Pulmonary effort is normal  No respiratory distress  Breath sounds: No wheezing or rales  Chest:      Chest wall: No tenderness  Abdominal:      Palpations: Abdomen is soft  Musculoskeletal:      Cervical back: Normal range of motion and neck supple  Skin:     General: Skin is warm and dry  Neurological:      Mental Status: She is oriented to person, place, and time     Psychiatric:         Mood and Affect: Mood normal          Behavior: Behavior normal

## 2021-08-16 NOTE — LETTER
August 16, 2021     Camille Wills, [address]  [place] [address]    Patient: Alicia Sood   YOB: 1975   Date of Visit: 8/16/2021       Dear Dr Brando Wills: Thank you for referring Ramses Bee to me for evaluation  Below are my notes for this consultation  If you have questions, please do not hesitate to call me  I look forward to following your patient along with you  Sincerely,        Puma Tamayo MD        CC: No Recipients  Puma Tamayo MD  8/16/2021  3:41 PM  Sign when Signing Visit  Assessment/Plan:    Palpitation    The patient has symptoms of palpitation of unknown etiology  This is not associated with any symptoms of the dizziness or syncope  I will be arranging for the patient to have a one-week extended monitor as well as an echocardiogram and I will arrange for a regular exercise stress test     Heart murmur    Patient has a faint systolic murmur  I will arrange for echocardiogram as mentioned above  Diagnoses and all orders for this visit:    Palpitation  -     Lipid Panel with Direct LDL reflex; Future  -     Stress test only, exercise; Future  -     Echo complete with contrast if indicated; Future  -     AMB extended holter monitor; Future    Heart murmur  -     Lipid Panel with Direct LDL reflex; Future  -     Stress test only, exercise; Future  -     Echo complete with contrast if indicated; Future  -     AMB extended holter monitor; Future          Subjective:   Symptoms of palpitation  Patient ID: Alicia Sood is a 48 y o  female  Patient presented to this office for the purpose cardiac evaluation consultation  Approximately weeks patient was experiencing symptoms skipping heartbeats generally she was quiet  This apparently not associated any symptoms chest pain shortness of symptoms lightheadedness she never experienced syncopal episodes  The patient is physically active able walk experiencing discomfort    Patient denies any history hypertension, diabetes or hyperlipidemia  The patient was told as a child that she has a simple murmur  She was smoking past 16 she has 2-3 drinks daily  Did have hysterectomy oophorectomy has been estrogen patch  There was a question as to whether the symptoms correlated the initiation this therapy but that time relationship is not clear family history significant for her father dying of complication cerebral bleed at the age of 68  Her mother  of complication of cancer at the age of 76  The following portions of the patient's history were reviewed and updated as appropriate: allergies, current medications, past family history, past medical history, past social history, past surgical history and problem list     Review of Systems   Respiratory: Negative for apnea, cough, chest tightness, shortness of breath and wheezing  Cardiovascular: Positive for palpitations  Negative for chest pain and leg swelling  Gastrointestinal: Negative for abdominal pain  Neurological: Negative for dizziness and light-headedness  Psychiatric/Behavioral: Negative  Objective:  Stable cardiac-wise  /72 (BP Location: Left arm, Patient Position: Sitting, Cuff Size: Adult)   Pulse 89   Ht 5' 4" (1 626 m)   Wt 63 1 kg (139 lb 3 2 oz)   SpO2 98%   BMI 23 89 kg/m²          Physical Exam  Vitals reviewed  Constitutional:       General: She is not in acute distress  Appearance: She is well-developed  She is not diaphoretic  HENT:      Head: Normocephalic and atraumatic  Neck:      Thyroid: No thyromegaly  Vascular: No JVD  Cardiovascular:      Rate and Rhythm: Normal rate and regular rhythm  Heart sounds: S1 normal and S2 normal  Murmur heard  Systolic murmur is present with a grade of 1/6  No friction rub  No gallop  Pulmonary:      Effort: Pulmonary effort is normal  No respiratory distress  Breath sounds: No wheezing or rales     Chest:      Chest wall: No tenderness  Abdominal:      Palpations: Abdomen is soft  Musculoskeletal:      Cervical back: Normal range of motion and neck supple  Skin:     General: Skin is warm and dry  Neurological:      Mental Status: She is oriented to person, place, and time     Psychiatric:         Mood and Affect: Mood normal          Behavior: Behavior normal

## 2021-08-16 NOTE — ASSESSMENT & PLAN NOTE
The patient has symptoms of palpitation of unknown etiology  This is not associated with any symptoms of the dizziness or syncope    I will be arranging for the patient to have a one-week extended monitor as well as an echocardiogram and I will arrange for a regular exercise stress test

## 2021-08-16 NOTE — PATIENT INSTRUCTIONS
The patient will be scheduled for lipid profile, Holter monitor for 1 week, regular exercise stress test and echocardiogram   She will continue present medications

## 2021-09-08 ENCOUNTER — CLINICAL SUPPORT (OUTPATIENT)
Dept: CARDIOLOGY CLINIC | Facility: CLINIC | Age: 46
End: 2021-09-08
Payer: COMMERCIAL

## 2021-09-08 DIAGNOSIS — R00.2 PALPITATION: ICD-10-CM

## 2021-09-08 DIAGNOSIS — R01.1 HEART MURMUR: ICD-10-CM

## 2021-09-08 PROCEDURE — 93244 EXT ECG>48HR<7D REV&INTERPJ: CPT | Performed by: INTERNAL MEDICINE

## 2021-09-13 ENCOUNTER — TELEPHONE (OUTPATIENT)
Dept: CARDIOLOGY CLINIC | Facility: CLINIC | Age: 46
End: 2021-09-13

## 2021-09-16 LAB
CHOLEST SERPL-MCNC: 203 MG/DL
CHOLEST/HDLC SERPL: 2.2 (CALC)
HDLC SERPL-MCNC: 91 MG/DL
LDLC SERPL CALC-MCNC: 97 MG/DL (CALC)
NONHDLC SERPL-MCNC: 112 MG/DL (CALC)
TRIGL SERPL-MCNC: 65 MG/DL

## 2021-09-20 ENCOUNTER — HOSPITAL ENCOUNTER (OUTPATIENT)
Dept: NON INVASIVE DIAGNOSTICS | Facility: CLINIC | Age: 46
Discharge: HOME | End: 2021-09-20
Payer: COMMERCIAL

## 2021-09-20 ENCOUNTER — APPOINTMENT (OUTPATIENT)
Dept: NON INVASIVE DIAGNOSTICS | Facility: CLINIC | Age: 46
End: 2021-09-20
Payer: COMMERCIAL

## 2021-09-20 DIAGNOSIS — R01.1 HEART MURMUR: ICD-10-CM

## 2021-09-20 DIAGNOSIS — R00.2 PALPITATION: ICD-10-CM

## 2021-09-20 PROCEDURE — 93016 CV STRESS TEST SUPVJ ONLY: CPT | Performed by: INTERNAL MEDICINE

## 2021-09-20 PROCEDURE — 93018 CV STRESS TEST I&R ONLY: CPT | Performed by: INTERNAL MEDICINE

## 2021-09-20 PROCEDURE — 93017 CV STRESS TEST TRACING ONLY: CPT

## 2021-09-21 ENCOUNTER — OFFICE VISIT (OUTPATIENT)
Dept: CARDIOLOGY CLINIC | Facility: CLINIC | Age: 46
End: 2021-09-21
Payer: COMMERCIAL

## 2021-09-21 VITALS
BODY MASS INDEX: 23.32 KG/M2 | DIASTOLIC BLOOD PRESSURE: 66 MMHG | WEIGHT: 136.6 LBS | HEIGHT: 64 IN | HEART RATE: 64 BPM | SYSTOLIC BLOOD PRESSURE: 110 MMHG | OXYGEN SATURATION: 98 %

## 2021-09-21 DIAGNOSIS — R00.2 PALPITATION: Primary | ICD-10-CM

## 2021-09-21 DIAGNOSIS — R01.1 HEART MURMUR: ICD-10-CM

## 2021-09-21 LAB
ARRHY DURING EX: NORMAL
CHEST PAIN STATEMENT: NORMAL
MAX DIASTOLIC BP: 78 MMHG
MAX HEART RATE: 153 BPM
MAX PREDICTED HEART RATE: 172 BPM
MAX. SYSTOLIC BP: 150 MMHG
PROTOCOL NAME: NORMAL
REASON FOR TERMINATION: NORMAL
TARGET HR FORMULA: NORMAL
TEST INDICATION: NORMAL
TIME IN EXERCISE PHASE: NORMAL

## 2021-09-21 PROCEDURE — 1036F TOBACCO NON-USER: CPT | Performed by: INTERNAL MEDICINE

## 2021-09-21 PROCEDURE — 3008F BODY MASS INDEX DOCD: CPT | Performed by: INTERNAL MEDICINE

## 2021-09-21 PROCEDURE — 99213 OFFICE O/P EST LOW 20 MIN: CPT | Performed by: INTERNAL MEDICINE

## 2021-09-21 NOTE — LETTER
September 21, 2021     Referral [address] [place] [address]    Patient: Lissette Garces   YOB: 1975   Date of Visit: 9/21/2021       Dear Dr Ling Campos:    Thank you for referring Gil Vargas to me for evaluation  Below are my notes for this consultation  If you have questions, please do not hesitate to call me  I look forward to following your patient along with you  Sincerely,        Elroy Pope MD        CC: DO Elroy Vasquez MD  9/21/2021  2:57 PM  Sign when Signing Visit  Assessment/Plan:    Palpitation    Minimal symptoms of palpitation with only infrequent premature atrial and premature ventricular contractions and no evidence of any complex arrhythmia  The symptoms are for the most part resolved except for an occasional skipping sensation  We will continue observation  Heart murmur    Faint systolic murmur was noted without evidence to suggest any significant valvular abnormalities  Diagnoses and all orders for this visit:    Palpitation    Heart murmur          Subjective:  Feels well  Patient ID: Lissette Garces is a 48 y o  female  Patient presented to this office purpose cardiac follow-up  She been complaining of symptoms palpitation  She was seen in the and was subsequently discharged evidence of significant  The patient underwent exercise stress test showing no evidence ischemia any significant  An extended monitor also obtained showing rare PACs PVCs notice  At this point the patient is feeling rather further symptoms palpitation except for an occasional skipping sensation  She has no symptoms of dizziness or syncope  She has no chest pain or shortness of breath  She has no leg edema  An echocardiogram was ordered but was declined by the insurance company        The following portions of the patient's history were reviewed and updated as appropriate: allergies, current medications, past family history, past medical history, past social history, past surgical history and problem list     Review of Systems   Respiratory: Negative for apnea, cough, chest tightness, shortness of breath and wheezing  Cardiovascular: Positive for palpitations (  Minimal)  Negative for chest pain and leg swelling  Gastrointestinal: Negative for abdominal pain  Neurological: Negative for dizziness and light-headedness  Psychiatric/Behavioral: Negative  Objective:    Stable cardiac-wise  /66 (BP Location: Left arm, Patient Position: Sitting, Cuff Size: Adult)   Pulse 64   Ht 5' 4" (1 626 m)   Wt 62 kg (136 lb 9 6 oz)   SpO2 98%   BMI 23 45 kg/m²          Physical Exam  Vitals reviewed  Constitutional:       General: She is not in acute distress  Appearance: She is well-developed  She is not diaphoretic  HENT:      Head: Normocephalic and atraumatic  Neck:      Thyroid: No thyromegaly  Vascular: No JVD  Cardiovascular:      Rate and Rhythm: Normal rate and regular rhythm  Heart sounds: S1 normal and S2 normal  Murmur heard  Systolic murmur is present with a grade of 1/6  No friction rub  No gallop  Pulmonary:      Effort: Pulmonary effort is normal  No respiratory distress  Breath sounds: No wheezing or rales  Chest:      Chest wall: No tenderness  Abdominal:      Palpations: Abdomen is soft  Musculoskeletal:      Cervical back: Normal range of motion and neck supple  Right lower leg: No edema  Left lower leg: No edema  Skin:     General: Skin is warm and dry  Neurological:      Mental Status: She is oriented to person, place, and time     Psychiatric:         Mood and Affect: Mood normal          Behavior: Behavior normal

## 2021-09-21 NOTE — PROGRESS NOTES
Assessment/Plan:    Palpitation    Minimal symptoms of palpitation with only infrequent premature atrial and premature ventricular contractions and no evidence of any complex arrhythmia  The symptoms are for the most part resolved except for an occasional skipping sensation  We will continue observation  Heart murmur    Faint systolic murmur was noted without evidence to suggest any significant valvular abnormalities  Diagnoses and all orders for this visit:    Palpitation    Heart murmur          Subjective:  Feels well  Patient ID: Rachael Luz is a 48 y o  female  Patient presented to this office purpose cardiac follow-up  She been complaining of symptoms palpitation  She was seen in the and was subsequently discharged evidence of significant  The patient underwent exercise stress test showing no evidence ischemia any significant  An extended monitor also obtained showing rare PACs PVCs notice  At this point the patient is feeling rather further symptoms palpitation except for an occasional skipping sensation  She has no symptoms of dizziness or syncope  She has no chest pain or shortness of breath  She has no leg edema  An echocardiogram was ordered but was declined by the insurance company  The following portions of the patient's history were reviewed and updated as appropriate: allergies, current medications, past family history, past medical history, past social history, past surgical history and problem list     Review of Systems   Respiratory: Negative for apnea, cough, chest tightness, shortness of breath and wheezing  Cardiovascular: Positive for palpitations (  Minimal)  Negative for chest pain and leg swelling  Gastrointestinal: Negative for abdominal pain  Neurological: Negative for dizziness and light-headedness  Psychiatric/Behavioral: Negative  Objective:    Stable cardiac-wise      /66 (BP Location: Left arm, Patient Position: Sitting, Cuff Size: Adult)   Pulse 64   Ht 5' 4" (1 626 m)   Wt 62 kg (136 lb 9 6 oz)   SpO2 98%   BMI 23 45 kg/m²          Physical Exam  Vitals reviewed  Constitutional:       General: She is not in acute distress  Appearance: She is well-developed  She is not diaphoretic  HENT:      Head: Normocephalic and atraumatic  Neck:      Thyroid: No thyromegaly  Vascular: No JVD  Cardiovascular:      Rate and Rhythm: Normal rate and regular rhythm  Heart sounds: S1 normal and S2 normal  Murmur heard  Systolic murmur is present with a grade of 1/6  No friction rub  No gallop  Pulmonary:      Effort: Pulmonary effort is normal  No respiratory distress  Breath sounds: No wheezing or rales  Chest:      Chest wall: No tenderness  Abdominal:      Palpations: Abdomen is soft  Musculoskeletal:      Cervical back: Normal range of motion and neck supple  Right lower leg: No edema  Left lower leg: No edema  Skin:     General: Skin is warm and dry  Neurological:      Mental Status: She is oriented to person, place, and time     Psychiatric:         Mood and Affect: Mood normal          Behavior: Behavior normal

## 2021-09-21 NOTE — ASSESSMENT & PLAN NOTE
Minimal symptoms of palpitation with only infrequent premature atrial and premature ventricular contractions and no evidence of any complex arrhythmia  The symptoms are for the most part resolved except for an occasional skipping sensation  We will continue observation

## 2021-10-25 ENCOUNTER — HOSPITAL ENCOUNTER (OUTPATIENT)
Dept: RADIOLOGY | Age: 46
Discharge: HOME | End: 2021-10-25
Payer: COMMERCIAL

## 2021-10-25 VITALS — WEIGHT: 135 LBS | HEIGHT: 64 IN | BODY MASS INDEX: 23.05 KG/M2

## 2021-10-25 DIAGNOSIS — Z12.31 ENCOUNTER FOR SCREENING MAMMOGRAM FOR MALIGNANT NEOPLASM OF BREAST: ICD-10-CM

## 2021-10-25 PROCEDURE — 77063 BREAST TOMOSYNTHESIS BI: CPT

## 2021-10-25 PROCEDURE — 77067 SCR MAMMO BI INCL CAD: CPT

## 2021-11-12 ENCOUNTER — TELEMEDICINE (OUTPATIENT)
Dept: FAMILY MEDICINE CLINIC | Facility: CLINIC | Age: 46
End: 2021-11-12

## 2021-11-12 DIAGNOSIS — J30.9 ALLERGIC RHINITIS, UNSPECIFIED SEASONALITY, UNSPECIFIED TRIGGER: Primary | ICD-10-CM

## 2021-11-12 PROCEDURE — 99213 OFFICE O/P EST LOW 20 MIN: CPT | Performed by: FAMILY MEDICINE

## 2021-11-12 RX ORDER — FLUTICASONE PROPIONATE 50 MCG
1 SPRAY, SUSPENSION (ML) NASAL DAILY
Qty: 11.1 ML | Refills: 2 | Status: SHIPPED | OUTPATIENT
Start: 2021-11-12

## 2021-12-27 DIAGNOSIS — N83.201 BILATERAL OVARIAN CYSTS: ICD-10-CM

## 2021-12-27 DIAGNOSIS — N83.202 BILATERAL OVARIAN CYSTS: ICD-10-CM

## 2021-12-27 RX ORDER — ESTRADIOL 0.07 MG/D
1 FILM, EXTENDED RELEASE TRANSDERMAL 2 TIMES WEEKLY
Qty: 24 PATCH | Refills: 0 | Status: SHIPPED | OUTPATIENT
Start: 2021-12-27 | End: 2022-01-19 | Stop reason: SDUPTHER

## 2022-01-19 ENCOUNTER — ANNUAL EXAM (OUTPATIENT)
Dept: OBGYN CLINIC | Facility: CLINIC | Age: 47
End: 2022-01-19
Payer: COMMERCIAL

## 2022-01-19 VITALS
HEIGHT: 64 IN | DIASTOLIC BLOOD PRESSURE: 74 MMHG | BODY MASS INDEX: 22.71 KG/M2 | WEIGHT: 133 LBS | SYSTOLIC BLOOD PRESSURE: 108 MMHG

## 2022-01-19 DIAGNOSIS — Z12.31 ENCOUNTER FOR SCREENING MAMMOGRAM FOR BREAST CANCER: ICD-10-CM

## 2022-01-19 DIAGNOSIS — Z01.419 ENCOUNTER FOR ANNUAL ROUTINE GYNECOLOGICAL EXAMINATION: Primary | ICD-10-CM

## 2022-01-19 DIAGNOSIS — E89.40 SURGICAL MENOPAUSE: ICD-10-CM

## 2022-01-19 DIAGNOSIS — N83.201 BILATERAL OVARIAN CYSTS: ICD-10-CM

## 2022-01-19 DIAGNOSIS — N83.202 BILATERAL OVARIAN CYSTS: ICD-10-CM

## 2022-01-19 PROCEDURE — S0612 ANNUAL GYNECOLOGICAL EXAMINA: HCPCS | Performed by: OBSTETRICS & GYNECOLOGY

## 2022-01-19 RX ORDER — ESTRADIOL 0.07 MG/D
1 FILM, EXTENDED RELEASE TRANSDERMAL 2 TIMES WEEKLY
Qty: 24 PATCH | Refills: 4 | Status: SHIPPED | OUTPATIENT
Start: 2022-01-20

## 2022-01-19 RX ORDER — PROPRANOLOL/HYDROCHLOROTHIAZID 40 MG-25MG
TABLET ORAL
COMMUNITY

## 2022-01-19 NOTE — PROGRESS NOTES
Assessment/Plan:  Pap smear done as well as annual   Encouraged self-breast examination as well as calcium supplementation  Continue annual mammogram   She will continue estrogen replacement therapy x1 year  Return to office in 1 year or p r n  No problem-specific Assessment & Plan notes found for this encounter  Diagnoses and all orders for this visit:    Encounter for annual routine gynecological examination    Encounter for screening mammogram for breast cancer  -     Mammo screening bilateral w 3d & cad; Future    Surgical menopause    Bilateral ovarian cysts  -     estradiol (VIVELLE-DOT) 0 075 MG/24HR; Place 1 patch on the skin 2 (two) times a week    Other orders  -     Turmeric 500 MG CAPS; Take by mouth          Subjective:      Patient ID: Phil Mukherjee is a 48 y o  female  HPI     This is a very pleasant 49-year-old female  ( x3, age 23, 21, 4) presents for her gyn exam   Patient underwent RA-TLHBSO 2021 for complex bilateral ovarian mass, benign cyst adenoma  She was then placed on estrogen therapy, transdermal   She felt significantly better less mood swings irritability, libido improved  She denies any changes in bowel or bladder function  She is sexually active and has been  for over 12 years  Pap smears have been normal as well as hysterectomy/cervical specimen  She was evaluated by Cardiology due to palpitations, workup negative, palpitations resolved  She does eat healthy and exercises regularly  The following portions of the patient's history were reviewed and updated as appropriate: allergies, current medications, past family history, past medical history, past social history, past surgical history and problem list     Review of Systems   Constitutional: Negative for fatigue, fever and unexpected weight change  Respiratory: Negative for cough, chest tightness, shortness of breath and wheezing  Cardiovascular: Negative    Negative for chest pain and palpitations  Gastrointestinal: Negative  Negative for abdominal distention, abdominal pain, blood in stool, constipation, diarrhea, nausea and vomiting  Genitourinary: Negative  Negative for difficulty urinating, dyspareunia, dysuria, flank pain, frequency, genital sores, hematuria, pelvic pain, urgency, vaginal bleeding, vaginal discharge and vaginal pain  Skin: Negative for rash  Objective:      /74   Ht 5' 3 5" (1 613 m)   Wt 60 3 kg (133 lb)   LMP 10/04/2020 (Approximate)   BMI 23 19 kg/m²          Physical Exam  Constitutional:       Appearance: Normal appearance  She is well-developed  Cardiovascular:      Rate and Rhythm: Normal rate and regular rhythm  Pulmonary:      Effort: Pulmonary effort is normal       Breath sounds: Normal breath sounds  Chest:   Breasts:      Right: No inverted nipple, mass, nipple discharge, skin change or tenderness  Left: No inverted nipple, mass, nipple discharge, skin change or tenderness  Abdominal:      General: Bowel sounds are normal  There is no distension  Palpations: Abdomen is soft  Tenderness: There is no abdominal tenderness  There is no guarding or rebound  Genitourinary:     Labia:         Right: No rash, tenderness or lesion  Left: No rash, tenderness or lesion  Vagina: Normal  No signs of injury  No vaginal discharge or tenderness  Uterus: Absent  Adnexa:         Right: No mass, tenderness or fullness  Left: No mass, tenderness or fullness  Comments: External genitalia is within normal limits  The vagina is well estrogenized  Cervix is surgically absent  The cuff is well-supported  Neurological:      Mental Status: She is alert and oriented to person, place, and time     Psychiatric:         Behavior: Behavior normal

## 2022-03-10 ENCOUNTER — OFFICE VISIT (OUTPATIENT)
Dept: OBGYN CLINIC | Facility: CLINIC | Age: 47
End: 2022-03-10
Payer: COMMERCIAL

## 2022-03-10 VITALS
DIASTOLIC BLOOD PRESSURE: 60 MMHG | BODY MASS INDEX: 22.88 KG/M2 | HEIGHT: 64 IN | SYSTOLIC BLOOD PRESSURE: 110 MMHG | WEIGHT: 134 LBS

## 2022-03-10 DIAGNOSIS — N76.0 ACUTE VAGINITIS: Primary | ICD-10-CM

## 2022-03-10 PROCEDURE — 87660 TRICHOMONAS VAGIN DIR PROBE: CPT | Performed by: NURSE PRACTITIONER

## 2022-03-10 PROCEDURE — 87510 GARDNER VAG DNA DIR PROBE: CPT | Performed by: NURSE PRACTITIONER

## 2022-03-10 PROCEDURE — 87480 CANDIDA DNA DIR PROBE: CPT | Performed by: NURSE PRACTITIONER

## 2022-03-10 PROCEDURE — 99213 OFFICE O/P EST LOW 20 MIN: CPT | Performed by: NURSE PRACTITIONER

## 2022-03-10 RX ORDER — BACILLUS COAGULANS/INULIN 1B-250 MG
CAPSULE ORAL
COMMUNITY

## 2022-03-10 RX ORDER — FLUCONAZOLE 100 MG/1
100 TABLET ORAL DAILY
Qty: 2 TABLET | Refills: 0 | Status: SHIPPED | OUTPATIENT
Start: 2022-03-10 | End: 2022-03-12

## 2022-03-10 NOTE — PROGRESS NOTES
SUBJECTIVE:   50 y o  female complains of vaginal irritation, dryness for about 1 5 weeks  No discharge   did make mention of a slight odor  She used one OTC vaginal suppository 2 days ago  Denies abnormal vaginal bleeding or significant pelvic pain or  fever  No UTI symptoms  Denies history of known exposure to STD  Patient's last menstrual period was 10/04/2020 (approximate)  OBJECTIVE:   She appears well, afebrile  Abdomen: benign, soft, nontender, no masses  Pelvic Exam: VULVA: normal appearing vulva with no masses, tenderness or lesions, VAGINA: vaginal discharge - copious, curd-like and odorless - consistent with candida  Cervix is surgically absent  Culture obtained  ASSESSMENT AND PLAN:     Jayson [place] was seen today for vaginal pain  Diagnoses and all orders for this visit:    Acute vaginitis  -     fluconazole (DIFLUCAN) 100 mg tablet; Take 1 tablet (100 mg total) by mouth daily for 2 days      Treatment initiated for candida  Results will be released to [facility], if abnormal will call to review and discuss treatment plan  All questions have been answered to her satisfaction         Follow-up if symptoms persist or as needed

## 2022-03-12 LAB
CANDIDA RRNA VAG QL PROBE: NEGATIVE
G VAGINALIS RRNA GENITAL QL PROBE: NEGATIVE
T VAGINALIS RRNA GENITAL QL PROBE: NEGATIVE

## 2022-03-16 DIAGNOSIS — N76.0 ACUTE VAGINITIS: Primary | ICD-10-CM

## 2022-03-16 RX ORDER — FLUCONAZOLE 150 MG/1
150 TABLET ORAL ONCE
Qty: 1 TABLET | Refills: 0 | Status: SHIPPED | OUTPATIENT
Start: 2022-03-16 | End: 2022-03-16

## 2022-03-21 ENCOUNTER — OFFICE VISIT (OUTPATIENT)
Dept: CARDIOLOGY CLINIC | Facility: CLINIC | Age: 47
End: 2022-03-21
Payer: COMMERCIAL

## 2022-03-21 VITALS
OXYGEN SATURATION: 99 % | RESPIRATION RATE: 18 BRPM | BODY MASS INDEX: 22.2 KG/M2 | HEIGHT: 64 IN | SYSTOLIC BLOOD PRESSURE: 108 MMHG | HEART RATE: 70 BPM | WEIGHT: 130 LBS | DIASTOLIC BLOOD PRESSURE: 60 MMHG

## 2022-03-21 DIAGNOSIS — R01.1 HEART MURMUR: ICD-10-CM

## 2022-03-21 DIAGNOSIS — R00.2 PALPITATION: Primary | ICD-10-CM

## 2022-03-21 PROCEDURE — 1036F TOBACCO NON-USER: CPT | Performed by: INTERNAL MEDICINE

## 2022-03-21 PROCEDURE — 99213 OFFICE O/P EST LOW 20 MIN: CPT | Performed by: INTERNAL MEDICINE

## 2022-03-21 PROCEDURE — 3008F BODY MASS INDEX DOCD: CPT | Performed by: INTERNAL MEDICINE

## 2022-03-21 NOTE — ASSESSMENT & PLAN NOTE
Minimal symptoms palpitation related probably to premature atrial premature ventricular contractions that are rare  We will continue observation

## 2022-03-21 NOTE — LETTER
March 21, 2022     Brandan Krishnan [address] [address]    Patient: Julieta Brooke   YOB: 1975   Date of Visit: 3/21/2022       Dear Dr Izaiah Maier: Thank you for referring Darrius Johansen to me for evaluation  Below are my notes for this consultation  If you have questions, please do not hesitate to call me  I look forward to following your patient along with you  Sincerely,        Gavin Fontaine MD        CC: No Recipients  Gavin Fontaine MD  3/21/2022  9:45 AM  Sign when Signing Visit  Assessment/Plan:    Palpitation    Minimal symptoms palpitation related probably to premature atrial premature ventricular contractions that are rare  We will continue observation  Heart murmur    Faint systolic murmur with evidence of significant valvular abnormalities  Diagnoses and all orders for this visit:    Palpitation    Heart murmur          Subjective:   Minimal palpitation  Patient ID: Julieta Brooke is a 50 y o  female  The patient presented to this office for the purpose of cardiac follow-up  She is known to have history of palpitation and heart murmur  The patient has been feeling rather well  She denies any symptom of chest pain or shortness of  She has minimal symptoms of palpitation and no symptoms of any dizziness or syncope  She has no leg edema  The following portions of the patient's history were reviewed and updated as appropriate: allergies, current medications, past family history, past medical history, past social history, past surgical history and problem list     Review of Systems   Respiratory: Negative for apnea, cough, chest tightness, shortness of breath and wheezing  Cardiovascular: Negative for chest pain, palpitations and leg swelling  Gastrointestinal: Negative for abdominal pain  Neurological: Negative for dizziness and light-headedness  Psychiatric/Behavioral: Negative            Objective: Stable cardiac-wise  /60 (BP Location: Left arm, Patient Position: Sitting, Cuff Size: Adult)   Pulse 70   Resp 18   Ht 5' 4" (1 626 m)   Wt 59 kg (130 lb)   LMP 10/04/2020 (Approximate)   SpO2 99%   BMI 22 31 kg/m²          Physical Exam  Vitals reviewed  Constitutional:       General: She is not in acute distress  Appearance: She is well-developed  She is not diaphoretic  HENT:      Head: Normocephalic and atraumatic  Neck:      Thyroid: No thyromegaly  Vascular: No JVD  Cardiovascular:      Rate and Rhythm: Normal rate and regular rhythm  Heart sounds: S1 normal and S2 normal  Murmur heard  Systolic murmur is present with a grade of 1/6  No friction rub  No gallop  Pulmonary:      Effort: Pulmonary effort is normal  No respiratory distress  Breath sounds: No wheezing or rales  Chest:      Chest wall: No tenderness  Abdominal:      Palpations: Abdomen is soft  Musculoskeletal:      Cervical back: Normal range of motion and neck supple  Right lower leg: No edema  Left lower leg: No edema  Skin:     General: Skin is warm and dry  Neurological:      Mental Status: She is oriented to person, place, and time     Psychiatric:         Mood and Affect: Mood normal          Behavior: Behavior normal

## 2022-03-21 NOTE — PROGRESS NOTES
Assessment/Plan:    Palpitation    Minimal symptoms palpitation related probably to premature atrial premature ventricular contractions that are rare  We will continue observation  Heart murmur    Faint systolic murmur with evidence of significant valvular abnormalities  Diagnoses and all orders for this visit:    Palpitation    Heart murmur          Subjective:   Minimal palpitation  Patient ID: Danika Fuentes is a 50 y o  female  The patient presented to this office for the purpose of cardiac follow-up  She is known to have history of palpitation and heart murmur  The patient has been feeling rather well  She denies any symptom of chest pain or shortness of  She has minimal symptoms of palpitation and no symptoms of any dizziness or syncope  She has no leg edema  The following portions of the patient's history were reviewed and updated as appropriate: allergies, current medications, past family history, past medical history, past social history, past surgical history and problem list     Review of Systems   Respiratory: Negative for apnea, cough, chest tightness, shortness of breath and wheezing  Cardiovascular: Negative for chest pain, palpitations and leg swelling  Gastrointestinal: Negative for abdominal pain  Neurological: Negative for dizziness and light-headedness  Psychiatric/Behavioral: Negative  Objective:  Stable cardiac-wise  /60 (BP Location: Left arm, Patient Position: Sitting, Cuff Size: Adult)   Pulse 70   Resp 18   Ht 5' 4" (1 626 m)   Wt 59 kg (130 lb)   LMP 10/04/2020 (Approximate)   SpO2 99%   BMI 22 31 kg/m²          Physical Exam  Vitals reviewed  Constitutional:       General: She is not in acute distress  Appearance: She is well-developed  She is not diaphoretic  HENT:      Head: Normocephalic and atraumatic  Neck:      Thyroid: No thyromegaly  Vascular: No JVD     Cardiovascular:      Rate and Rhythm: Normal rate and regular rhythm  Heart sounds: S1 normal and S2 normal  Murmur heard  Systolic murmur is present with a grade of 1/6  No friction rub  No gallop  Pulmonary:      Effort: Pulmonary effort is normal  No respiratory distress  Breath sounds: No wheezing or rales  Chest:      Chest wall: No tenderness  Abdominal:      Palpations: Abdomen is soft  Musculoskeletal:      Cervical back: Normal range of motion and neck supple  Right lower leg: No edema  Left lower leg: No edema  Skin:     General: Skin is warm and dry  Neurological:      Mental Status: She is oriented to person, place, and time     Psychiatric:         Mood and Affect: Mood normal          Behavior: Behavior normal

## 2022-03-30 ENCOUNTER — APPOINTMENT (OUTPATIENT)
Dept: LAB | Facility: CLINIC | Age: 47
End: 2022-03-30
Payer: COMMERCIAL

## 2022-03-30 DIAGNOSIS — R39.9 LOWER URINARY TRACT SYMPTOMS (LUTS): Primary | ICD-10-CM

## 2022-03-30 DIAGNOSIS — R39.9 LOWER URINARY TRACT SYMPTOMS (LUTS): ICD-10-CM

## 2022-03-30 LAB
BACTERIA UR QL AUTO: NORMAL /HPF
BILIRUB UR QL STRIP: NEGATIVE
CLARITY UR: CLEAR
COLOR UR: COLORLESS
GLUCOSE UR STRIP-MCNC: NEGATIVE MG/DL
HGB UR QL STRIP.AUTO: NEGATIVE
KETONES UR STRIP-MCNC: NEGATIVE MG/DL
LEUKOCYTE ESTERASE UR QL STRIP: NEGATIVE
NITRITE UR QL STRIP: NEGATIVE
NON-SQ EPI CELLS URNS QL MICRO: NORMAL /HPF
PH UR STRIP.AUTO: 6.5 [PH]
PROT UR STRIP-MCNC: NEGATIVE MG/DL
RBC #/AREA URNS AUTO: NORMAL /HPF
SP GR UR STRIP.AUTO: 1 (ref 1–1.03)
UROBILINOGEN UR STRIP-ACNC: <2 MG/DL
WBC #/AREA URNS AUTO: NORMAL /HPF

## 2022-03-30 PROCEDURE — 87086 URINE CULTURE/COLONY COUNT: CPT

## 2022-03-30 PROCEDURE — 81001 URINALYSIS AUTO W/SCOPE: CPT

## 2022-03-31 LAB — BACTERIA UR CULT: NORMAL

## 2022-09-19 ENCOUNTER — OFFICE VISIT (OUTPATIENT)
Dept: FAMILY MEDICINE CLINIC | Facility: CLINIC | Age: 47
End: 2022-09-19

## 2022-09-19 VITALS
HEART RATE: 67 BPM | DIASTOLIC BLOOD PRESSURE: 80 MMHG | HEIGHT: 64 IN | BODY MASS INDEX: 22.61 KG/M2 | SYSTOLIC BLOOD PRESSURE: 108 MMHG | RESPIRATION RATE: 18 BRPM | OXYGEN SATURATION: 99 % | WEIGHT: 132.4 LBS | TEMPERATURE: 97.2 F

## 2022-09-19 DIAGNOSIS — Z11.59 ENCOUNTER FOR HEPATITIS C SCREENING TEST FOR LOW RISK PATIENT: ICD-10-CM

## 2022-09-19 DIAGNOSIS — Z11.4 SCREENING FOR HIV (HUMAN IMMUNODEFICIENCY VIRUS): ICD-10-CM

## 2022-09-19 DIAGNOSIS — M54.81 OCCIPITAL NEURALGIA OF LEFT SIDE: Primary | ICD-10-CM

## 2022-09-19 DIAGNOSIS — G62.9 NEUROPATHY: ICD-10-CM

## 2022-09-19 PROCEDURE — 99214 OFFICE O/P EST MOD 30 MIN: CPT | Performed by: FAMILY MEDICINE

## 2022-09-19 NOTE — PROGRESS NOTES
Assessment/Plan:    Occipital neuralgia of left side  -ongoing complains of pain, shooting sensation/tenderness in the left side of the neck (occipital area) as well as sensation of intermittent choking that feels like a spasm; symptoms present for few years, but became more frequent recently  -unclear etiology at this point (possibly occipital neuralgia, neuropathy)  -will start with blood work to r/u organic causes for neuropathy  -referred to neurology for evaluation  -patient is due for annual physical exam, advised to schedule       Diagnoses and all orders for this visit:    Occipital neuralgia of left side  -     Ambulatory Referral to Neurology; Future  -     CBC and differential; Future  -     TSH, 3rd generation with Free T4 reflex; Future  -     Folate; Future  -     Vitamin B12; Future  -     Comprehensive metabolic panel; Future  -     Magnesium; Future    Neuropathy  -     CBC and differential; Future  -     TSH, 3rd generation with Free T4 reflex; Future  -     Folate; Future  -     Vitamin B12; Future  -     Comprehensive metabolic panel; Future  -     Magnesium; Future    Screening for HIV (human immunodeficiency virus)  -     HIV 1/2 Antigen/Antibody (4th Generation) w Reflex [facility]; Future    Encounter for hepatitis C screening test for low risk patient  -     Hepatitis C antibody; Future          Subjective:      Patient ID: Phil Mukherjee is a 50 y o  female  Patient presented due to ongoing complains of pain, shooting sensation/tenderness in the left side of the neck (occipital area) as well as sensation of intermittent choking that feels like a spasm  She has been having those symptoms for over few years, but feels that hey became more frequent happening few times a week  She also has intermittent headache on the left side as well        The following portions of the patient's history were reviewed and updated as appropriate: allergies, current medications, past family history, past medical history, past social history, past surgical history and problem list     Review of Systems   Constitutional: Negative for chills, diaphoresis, fatigue and fever  HENT: Positive for trouble swallowing (intermittent choking sensation)  Negative for congestion, ear discharge, ear pain, mouth sores, rhinorrhea and sore throat  Eyes: Negative for photophobia, pain and discharge  Respiratory: Negative for cough, chest tightness, shortness of breath and wheezing  Cardiovascular: Negative for chest pain, palpitations and leg swelling  Gastrointestinal: Negative for abdominal distention, abdominal pain, blood in stool, constipation, diarrhea and nausea  Genitourinary: Negative for difficulty urinating and frequency  Musculoskeletal: Negative for joint swelling and neck stiffness  Pain and tenderness left side of the neck (specific point in the occipital area)   Skin: Negative for color change, pallor and rash  Neurological: Positive for headaches (left side)  Negative for dizziness, syncope and numbness  Objective:      /80 (BP Location: Left arm, Patient Position: Sitting, Cuff Size: Standard)   Pulse 67   Temp (!) 97 2 °F (36 2 °C) (Temporal)   Resp 18   Ht 5' 4" (1 626 m)   Wt 60 1 kg (132 lb 6 4 oz)   LMP 10/04/2020 (Approximate)   SpO2 99%   BMI 22 73 kg/m²          Physical Exam  Constitutional:       General: She is not in acute distress  Appearance: She is well-developed  HENT:      Head: Normocephalic and atraumatic  Eyes:      General: No scleral icterus  Pupils: Pupils are equal, round, and reactive to light  Neck:     Cardiovascular:      Rate and Rhythm: Normal rate and regular rhythm  Heart sounds: Normal heart sounds  No murmur heard  No friction rub  No gallop  Pulmonary:      Effort: Pulmonary effort is normal  No respiratory distress  Breath sounds: Normal breath sounds  No wheezing or rales     Chest:      Chest wall: No tenderness  Abdominal:      General: Bowel sounds are normal  There is no distension  Palpations: Abdomen is soft  Tenderness: There is no abdominal tenderness  There is no rebound  Musculoskeletal:         General: No deformity  Normal range of motion  Cervical back: Normal range of motion and neck supple  Tenderness (left occipital, pain worsens with turning head toward effected side) present  No rigidity, torticollis or crepitus  Pain with movement (worse toward turning to left side) present  Normal range of motion  Lymphadenopathy:      Cervical: No cervical adenopathy  Skin:     General: Skin is warm and dry  Coloration: Skin is not pale  Findings: No erythema or rash  Neurological:      Mental Status: She is alert and oriented to person, place, and time

## 2022-09-19 NOTE — ASSESSMENT & PLAN NOTE
-ongoing complains of pain, shooting sensation/tenderness in the left side of the neck (occipital area) as well as sensation of intermittent choking that feels like a spasm; symptoms present for few years, but became more frequent recently  -unclear etiology at this point (possibly occipital neuralgia, neuropathy)  -will start with blood work to r/u organic causes for neuropathy  -referred to neurology for evaluation  -patient is due for annual physical exam, advised to schedule

## 2022-10-06 ENCOUNTER — APPOINTMENT (OUTPATIENT)
Dept: LAB | Facility: CLINIC | Age: 47
End: 2022-10-06
Payer: COMMERCIAL

## 2022-10-06 DIAGNOSIS — M54.81 OCCIPITAL NEURALGIA OF LEFT SIDE: ICD-10-CM

## 2022-10-06 DIAGNOSIS — Z11.4 SCREENING FOR HIV (HUMAN IMMUNODEFICIENCY VIRUS): ICD-10-CM

## 2022-10-06 DIAGNOSIS — Z11.59 ENCOUNTER FOR HEPATITIS C SCREENING TEST FOR LOW RISK PATIENT: ICD-10-CM

## 2022-10-06 DIAGNOSIS — G62.9 NEUROPATHY: ICD-10-CM

## 2022-10-06 LAB
ALBUMIN SERPL BCP-MCNC: 3.9 G/DL (ref 3.5–5)
ALP SERPL-CCNC: 62 U/L (ref 46–116)
ALT SERPL W P-5'-P-CCNC: 26 U/L (ref 12–78)
ANION GAP SERPL CALCULATED.3IONS-SCNC: 7 MMOL/L (ref 4–13)
AST SERPL W P-5'-P-CCNC: 19 U/L (ref 5–45)
BASOPHILS # BLD AUTO: 0.07 THOUSANDS/ΜL (ref 0–0.1)
BASOPHILS NFR BLD AUTO: 1 % (ref 0–1)
BILIRUB SERPL-MCNC: 0.73 MG/DL (ref 0.2–1)
BUN SERPL-MCNC: 13 MG/DL (ref 5–25)
CALCIUM SERPL-MCNC: 9.3 MG/DL (ref 8.3–10.1)
CHLORIDE SERPL-SCNC: 106 MMOL/L (ref 96–108)
CO2 SERPL-SCNC: 24 MMOL/L (ref 21–32)
CREAT SERPL-MCNC: 0.82 MG/DL (ref 0.6–1.3)
EOSINOPHIL # BLD AUTO: 0.37 THOUSAND/ΜL (ref 0–0.61)
EOSINOPHIL NFR BLD AUTO: 6 % (ref 0–6)
ERYTHROCYTE [DISTWIDTH] IN BLOOD BY AUTOMATED COUNT: 12.4 % (ref 11.6–15.1)
FOLATE SERPL-MCNC: >20 NG/ML (ref 3.1–17.5)
GFR SERPL CREATININE-BSD FRML MDRD: 84 ML/MIN/1.73SQ M
GLUCOSE P FAST SERPL-MCNC: 81 MG/DL (ref 65–99)
HCT VFR BLD AUTO: 40.4 % (ref 34.8–46.1)
HCV AB SER QL: NORMAL
HGB BLD-MCNC: 13.3 G/DL (ref 11.5–15.4)
IMM GRANULOCYTES # BLD AUTO: 0.01 THOUSAND/UL (ref 0–0.2)
IMM GRANULOCYTES NFR BLD AUTO: 0 % (ref 0–2)
LYMPHOCYTES # BLD AUTO: 2.33 THOUSANDS/ΜL (ref 0.6–4.47)
LYMPHOCYTES NFR BLD AUTO: 38 % (ref 14–44)
MAGNESIUM SERPL-MCNC: 2 MG/DL (ref 1.6–2.6)
MCH RBC QN AUTO: 32.1 PG (ref 26.8–34.3)
MCHC RBC AUTO-ENTMCNC: 32.9 G/DL (ref 31.4–37.4)
MCV RBC AUTO: 98 FL (ref 82–98)
MONOCYTES # BLD AUTO: 0.51 THOUSAND/ΜL (ref 0.17–1.22)
MONOCYTES NFR BLD AUTO: 8 % (ref 4–12)
NEUTROPHILS # BLD AUTO: 2.88 THOUSANDS/ΜL (ref 1.85–7.62)
NEUTS SEG NFR BLD AUTO: 47 % (ref 43–75)
NRBC BLD AUTO-RTO: 0 /100 WBCS
PLATELET # BLD AUTO: 245 THOUSANDS/UL (ref 149–390)
PMV BLD AUTO: 9.7 FL (ref 8.9–12.7)
POTASSIUM SERPL-SCNC: 4.1 MMOL/L (ref 3.5–5.3)
PROT SERPL-MCNC: 6.9 G/DL (ref 6.4–8.4)
RBC # BLD AUTO: 4.14 MILLION/UL (ref 3.81–5.12)
SODIUM SERPL-SCNC: 137 MMOL/L (ref 135–147)
TSH SERPL DL<=0.05 MIU/L-ACNC: 2.35 UIU/ML (ref 0.45–4.5)
VIT B12 SERPL-MCNC: 431 PG/ML (ref 100–900)
WBC # BLD AUTO: 6.17 THOUSAND/UL (ref 4.31–10.16)

## 2022-10-06 PROCEDURE — 82746 ASSAY OF FOLIC ACID SERUM: CPT

## 2022-10-06 PROCEDURE — 87389 HIV-1 AG W/HIV-1&-2 AB AG IA: CPT

## 2022-10-06 PROCEDURE — 36415 COLL VENOUS BLD VENIPUNCTURE: CPT

## 2022-10-06 PROCEDURE — 80053 COMPREHEN METABOLIC PANEL: CPT

## 2022-10-06 PROCEDURE — 82607 VITAMIN B-12: CPT

## 2022-10-06 PROCEDURE — 84443 ASSAY THYROID STIM HORMONE: CPT

## 2022-10-06 PROCEDURE — 85025 COMPLETE CBC W/AUTO DIFF WBC: CPT

## 2022-10-06 PROCEDURE — 83735 ASSAY OF MAGNESIUM: CPT

## 2022-10-06 PROCEDURE — 86803 HEPATITIS C AB TEST: CPT

## 2022-10-07 LAB — HIV 1+2 AB+HIV1 P24 AG SERPL QL IA: NORMAL

## 2022-10-20 ENCOUNTER — OFFICE VISIT (OUTPATIENT)
Dept: FAMILY MEDICINE CLINIC | Facility: CLINIC | Age: 47
End: 2022-10-20

## 2022-10-20 VITALS
DIASTOLIC BLOOD PRESSURE: 73 MMHG | BODY MASS INDEX: 22.67 KG/M2 | RESPIRATION RATE: 18 BRPM | WEIGHT: 132.8 LBS | SYSTOLIC BLOOD PRESSURE: 112 MMHG | HEIGHT: 64 IN | TEMPERATURE: 98.6 F | HEART RATE: 66 BPM

## 2022-10-20 DIAGNOSIS — Z12.11 SCREEN FOR COLON CANCER: Primary | ICD-10-CM

## 2022-10-20 PROCEDURE — S0612 ANNUAL GYNECOLOGICAL EXAMINA: HCPCS | Performed by: FAMILY MEDICINE

## 2022-10-20 NOTE — PROGRESS NOTES
[address] [facility] SH[facility]    NAME: Regina Castillo  AGE: 50 y o  SEX: female  : 1975     DATE: 10/20/2022     Assessment and Plan:     Problem List Items Addressed This Visit    None     Visit Diagnoses     Screen for colon cancer    -  Primary    Relevant Orders    Ambulatory referral for colonoscopy          Immunizations and preventive care screenings were discussed with patient today  Appropriate education was printed on patient's after visit summary  Counseling:  Alcohol/drug use: discussed moderation in alcohol intake, the recommendations for healthy alcohol use, and avoidance of illicit drug use  Dental Health: discussed importance of regular tooth brushing, flossing, and dental visits  Injury prevention: discussed safety/seat belts, safety helmets, smoke detectors, carbon dioxide detectors, and smoking near bedding or upholstery  Sexual health: discussed sexually transmitted diseases, partner selection, use of condoms, avoidance of unintended pregnancy, and contraceptive alternatives  · Exercise: the importance of regular exercise/physical activity was discussed  Recommend exercise 3-5 times per week for at least 30 minutes  No follow-ups on file  Chief Complaint:     Chief Complaint   Patient presents with   • Physical Exam      History of Present Illness:     Adult Annual Physical   Patient here for a comprehensive physical exam  The patient reports no problems  Diet and Physical Activity  · Diet/Nutrition: well balanced diet  Has been working on diet/exercise  · Exercise: vigorous cardiovascular exercise  Depression Screening  PHQ-2/9 Depression Screening    Little interest or pleasure in doing things: 0 - not at all  Feeling down, depressed, or hopeless: 0 - not at all  PHQ-2 Score: 0  PHQ-2 Interpretation: Negative depression screen       General Health  · Sleep: sleeps well  · Hearing: normal - bilateral   · Vision: no vision problems and wears contacts  · Dental: no dental visits for >1 year and brushes teeth twice daily  /GYN Health  · Patient is: postmenopausal  · Last menstrual period: prior to hysterectomy  · Contraceptive method: hysterectomy  Review of Systems:     Review of Systems   Constitutional: Negative for activity change, chills, fever and unexpected weight change  HENT: Negative for congestion  Eyes: Negative for visual disturbance  Respiratory: Negative for cough, chest tightness, shortness of breath and wheezing  Cardiovascular: Negative for chest pain  Gastrointestinal: Negative for abdominal pain, diarrhea and nausea  Endocrine: Negative for cold intolerance and heat intolerance  Musculoskeletal: Negative for arthralgias and myalgias  Skin: Negative for color change  Neurological: Negative for dizziness  Psychiatric/Behavioral: Negative for agitation, dysphoric mood and sleep disturbance        Past Medical History:     Past Medical History:   Diagnosis Date   • Gastritis     Age 19   • GERD (gastroesophageal reflux disease)    • Heart murmur       Past Surgical History:     Past Surgical History:   Procedure Laterality Date   • EGD     • HYSTERECTOMY     • LAPAROSCOPIC CHOLECYSTECTOMY     • OOPHORECTOMY     • IA LAPAROSCOPY W TOT HYSTERECTUTERUS <=250 GRAM  W TUBE/OVARY N/A 2021    Procedure: ROBOTIC TOTAL LAPAROSCOPIC HYSTERECTOMY, BILATERAL SALPINGO-OOPHORECTOMY;  Surgeon: Phuc Vick MD;  [facility];  Service: Gynecology Oncology   • WISDOM TOOTH EXTRACTION        Social History:     Social History     Socioeconomic History   • Marital status: /Civil Union     Spouse name: None   • Number of children: None   • Years of education: None   • Highest education level: None   Occupational History   • None   Tobacco Use   • Smoking status: Former Smoker     Quit date:      Years since quittin 8 • Smokeless tobacco: Never Used   Vaping Use   • Vaping Use: Never used   Substance and Sexual Activity   • Alcohol use: Yes     Alcohol/week: 10 0 standard drinks     Types: 9 Glasses of wine, 1 Cans of beer per week     Comment: daily    • Drug use: No   • Sexual activity: Yes     Partners: Male     Birth control/protection: Female Sterilization   Other Topics Concern   • None   Social History Narrative   • None     Social Determinants of Health     Financial Resource Strain: Low Risk    • Difficulty of Paying Living Expenses: Not hard at all   Food Insecurity: No Food Insecurity   • Worried About Running Out of Food in the Last Year: Never true   • Ran Out of Food in the Last Year: Never true   Transportation Needs: No Transportation Needs   • Lack of Transportation (Medical): No   • Lack of Transportation (Non-Medical):  No   Physical Activity: Sufficiently Active   • Days of Exercise per Week: 5 days   • Minutes of Exercise per Session: 90 min   Stress: No Stress Concern Present   • Feeling of Stress : Not at all   Social Connections: Not on file   Intimate Partner Violence: Not At Risk   • Fear of Current or Ex-Partner: No   • Emotionally Abused: No   • Physically Abused: No   • Sexually Abused: No   Housing Stability: Low Risk    • Unable to Pay for Housing in the Last Year: No   • Number of Places Lived in the Last Year: 1   • Unstable Housing in the Last Year: No      Family History:     Family History   Problem Relation Age of Onset   • Tongue cancer Mother 76   • No Known Problems Father    • No Known Problems Sister    • No Known Problems Daughter    • No Known Problems Maternal Grandmother    • No Known Problems Maternal Grandfather    • No Known Problems Paternal Grandmother    • No Known Problems Paternal Grandfather    • Ovarian cancer Cousin 48   • No Known Problems Son    • No Known Problems Son    • No Known Problems Sister    • No Known Problems Paternal Aunt    • No Known Problems Paternal Aunt • No Known Problems Paternal Aunt    • No Known Problems Paternal Aunt    • No Known Problems Paternal Aunt       Current Medications:     Current Outpatient Medications   Medication Sig Dispense Refill   • Bacillus Coagulans-Inulin (Probiotic) 1-250 BILLION-MG CAPS Take by mouth     • BIOTIN PO Take by mouth daily     • Cholecalciferol (VITAMIN D3 PO) Take by mouth daily     • estradiol (VIVELLE-DOT) 0 075 MG/24HR Place 1 patch on the skin 2 (two) times a week 24 patch 4   • FOLIC ACID PO Take by mouth daily     • loratadine (CLARITIN) 10 mg tablet Take 10 mg by mouth daily in the early morning      • MILK THISTLE PO Take by mouth daily     • pseudoephedrine-guaifenesin (MUCINEX D)  MG per tablet Take 1 tablet by mouth as needed     • Turmeric 500 MG CAPS Take by mouth     • Cranberry 1000 MG CAPS Take by mouth daily     • fluticasone (FLONASE) 50 mcg/act nasal spray 1 spray into each nostril daily (Patient not taking: Reported on 1/19/2022 ) 11 1 mL 2     No current facility-administered medications for this visit  Allergies: Allergies   Allergen Reactions   • Soybean Oil - Food Allergy GI Intolerance      Physical Exam:     /73   Pulse 66   Temp 98 6 °F (37 °C)   Resp 18   Ht 5' 4" (1 626 m)   Wt 60 2 kg (132 lb 12 8 oz)   LMP 10/04/2020 (Approximate)   BMI 22 80 kg/m²     Physical Exam  Vitals and nursing note reviewed  Constitutional:       General: She is not in acute distress  Appearance: She is well-developed  HENT:      Head: Normocephalic and atraumatic  Eyes:      Conjunctiva/sclera: Conjunctivae normal    Cardiovascular:      Rate and Rhythm: Normal rate and regular rhythm  Heart sounds: No murmur heard  Pulmonary:      Effort: Pulmonary effort is normal  No respiratory distress  Breath sounds: Normal breath sounds  Abdominal:      Palpations: Abdomen is soft  Tenderness: There is no abdominal tenderness     Musculoskeletal:      Cervical back: Neck supple  Skin:     General: Skin is warm and dry  Neurological:      Mental Status: She is alert            Brandan Salmon

## 2022-10-26 ENCOUNTER — HOSPITAL ENCOUNTER (OUTPATIENT)
Dept: RADIOLOGY | Age: 47
Discharge: HOME | End: 2022-10-26
Payer: COMMERCIAL

## 2022-10-26 VITALS — WEIGHT: 132 LBS | HEIGHT: 63 IN | BODY MASS INDEX: 23.39 KG/M2

## 2022-10-26 DIAGNOSIS — Z12.31 ENCOUNTER FOR SCREENING MAMMOGRAM FOR MALIGNANT NEOPLASM OF BREAST: ICD-10-CM

## 2022-10-26 DIAGNOSIS — Z12.31 ENCOUNTER FOR SCREENING MAMMOGRAM FOR BREAST CANCER: ICD-10-CM

## 2022-10-26 PROCEDURE — 77063 BREAST TOMOSYNTHESIS BI: CPT

## 2022-10-26 PROCEDURE — 77067 SCR MAMMO BI INCL CAD: CPT

## 2022-11-10 ENCOUNTER — TELEPHONE (OUTPATIENT)
Dept: GASTROENTEROLOGY | Facility: CLINIC | Age: 47
End: 2022-11-10

## 2022-11-10 DIAGNOSIS — Z12.11 SCREEN FOR COLON CANCER: Primary | ICD-10-CM

## 2022-11-10 NOTE — TELEPHONE ENCOUNTER
Scheduled date of colonoscopy (as of today):03 09 23  Physician performing colonoscopy:DR DAVIS [facility]  Location of colonoscopy:[facility]  Bowel prep reviewed with patient:JAYCEE  Instructions reviewed with patient by:MAILED  Clearances: N/A      11/01/22  Screened by: Bre Vigil    Referring Provider Dr Anna Navarro: Body mass index is 23 38 kg/m²  Has patient been referred for a routine screening Colonoscopy? yes  Is the patient between 39-70 years old? yes      Previous Colonoscopy no   If yes:    Date:     Facility:     Reason:       SCHEDULING STAFF: If the patient is between 45yrs-49yrs, please advise patient to confirm benefits/coverage with their insurance company for a routine screening colonoscopy, some insurance carriers will only cover at [address] or older  If the patient is over 66years old, please schedule an office visit  Does the patient want to see a Gastroenterologist prior to their procedure OR are they having any GI symptoms? no    Has the patient been hospitalized or had abdominal surgery in the past 6 months? no    Does the patient use supplemental oxygen? no    Does the patient take Coumadin, Lovenox, Plavix, Elliquis, Xarelto, or other blood thinning medication? no    Has the patient had a stroke, cardiac event, or stent placed in the past year? no    SCHEDULING STAFF: If patient answers NO to above questions, then schedule procedure  If patient answers YES to above questions, then schedule office appointment  If patient is between 45yrs - 49yrs, please advise patient that we will have to confirm benefits & coverage with their insurance company for a routine screening colonoscopy

## 2023-01-05 ENCOUNTER — OFFICE VISIT (OUTPATIENT)
Dept: URGENT CARE | Facility: CLINIC | Age: 48
End: 2023-01-05

## 2023-01-05 VITALS
HEART RATE: 63 BPM | WEIGHT: 128 LBS | HEIGHT: 63 IN | OXYGEN SATURATION: 99 % | BODY MASS INDEX: 22.68 KG/M2 | RESPIRATION RATE: 18 BRPM | TEMPERATURE: 96 F

## 2023-01-05 DIAGNOSIS — S61.219A LACERATION WITHOUT FOREIGN BODY OF UNSPECIFIED FINGER WITHOUT DAMAGE TO NAIL, INITIAL ENCOUNTER: Primary | ICD-10-CM

## 2023-01-05 RX ORDER — CEPHALEXIN 500 MG/1
500 CAPSULE ORAL EVERY 8 HOURS SCHEDULED
Qty: 30 CAPSULE | Refills: 0 | Status: SHIPPED | OUTPATIENT
Start: 2023-01-05 | End: 2023-01-15

## 2023-01-05 NOTE — PROGRESS NOTES
[facility]        NAME: Caterina Hare is a 50 y o  female  : 1975    MRN: [id number]  DATE: 2023  TIME: 1:43 PM    Pulse 63   Temp (!) 96 °F (35 6 °C)   Resp 18   Ht 5' 3" (1 6 m)   Wt 58 1 kg (128 lb)   LMP 10/04/2020 (Approximate)   SpO2 99%   BMI 22 67 kg/m²     Assessment and Plan   Laceration without foreign body of unspecified finger without damage to nail, initial encounter [S61 [id number]]  1  Laceration without foreign body of unspecified finger without damage to nail, initial encounter  Tdap Vaccine greater than or equal to 8yo    Laceration repair    cephalexin (KEFLEX) 500 mg capsule            Patient Instructions       Follow up with PCP in 3-5 days  Proceed to  ER if symptoms worsen  Chief Complaint     Chief Complaint   Patient presents with   • Laceration     PT presents with laceration of the middle finger of the left hand that happened this morning while she was using an exacto knife at home  History of Present Illness       Pt with  laceration to left 3rd finger tip x 1 hour       Review of Systems   Review of Systems   Constitutional: Negative  HENT: Negative  Eyes: Negative  Respiratory: Negative  Cardiovascular: Negative  Gastrointestinal: Negative  Endocrine: Negative  Genitourinary: Negative  Musculoskeletal: Negative  Skin: Negative  Allergic/Immunologic: Negative  Neurological: Negative  Hematological: Negative  Psychiatric/Behavioral: Negative  All other systems reviewed and are negative          Current Medications       Current Outpatient Medications:   •  Bacillus Coagulans-Inulin (Probiotic) 1-250 BILLION-MG CAPS, Take by mouth, Disp: , Rfl:   •  cephalexin (KEFLEX) 500 mg capsule, Take 1 capsule (500 mg total) by mouth every 8 (eight) hours for 10 days, Disp: 30 capsule, Rfl: 0  •  Cholecalciferol (VITAMIN D3 PO), Take by mouth daily, Disp: , Rfl:   •  estradiol (VIVELLE-DOT) 0 075 MG/24HR, Place 1 patch on the skin 2 (two) times a week, Disp: 24 patch, Rfl: 4  •  FOLIC ACID PO, Take by mouth daily, Disp: , Rfl:   •  loratadine (CLARITIN) 10 mg tablet, Take 10 mg by mouth daily in the early morning , Disp: , Rfl:   •  MILK THISTLE PO, Take by mouth daily, Disp: , Rfl:   •  polyethylene glycol (GOLYTELY) 4000 mL solution, Take 4,000 mL by mouth once for 1 dose Take as directed by office (Patient not taking: Reported on 1/5/2023), Disp: 4000 mL, Rfl: 0  •  pseudoephedrine-guaifenesin (MUCINEX D)  MG per tablet, Take 1 tablet by mouth as needed, Disp: , Rfl:   •  Turmeric 500 MG CAPS, Take by mouth, Disp: , Rfl:   •  BIOTIN PO, Take by mouth daily (Patient not taking: Reported on 1/5/2023), Disp: , Rfl:   •  Cranberry 1000 MG CAPS, Take by mouth daily (Patient not taking: Reported on 1/5/2023), Disp: , Rfl:   •  fluticasone (FLONASE) 50 mcg/act nasal spray, 1 spray into each nostril daily (Patient not taking: Reported on 1/19/2022), Disp: 11 1 mL, Rfl: 2    Current Allergies     Allergies as of 01/05/2023 - Reviewed 01/05/2023   Allergen Reaction Noted   • Soybean oil - food allergy GI Intolerance 01/12/2021            The following portions of the patient's history were reviewed and updated as appropriate: allergies, current medications, past family history, past medical history, past social history, past surgical history and problem list      Past Medical History:   Diagnosis Date   • Gastritis     Age 19   • GERD (gastroesophageal reflux disease)    • Heart murmur        Past Surgical History:   Procedure Laterality Date   • EGD     • HYSTERECTOMY     • LAPAROSCOPIC CHOLECYSTECTOMY  1992   • OOPHORECTOMY     • MS LAPS TOTAL HYSTERECT 250 GM/< W/RMVL TUBE/OVARY N/A 2/4/2021    Procedure: ROBOTIC TOTAL LAPAROSCOPIC HYSTERECTOMY, BILATERAL SALPINGO-OOPHORECTOMY;  Surgeon: Dave Sofia MD;  [facility];  Service: Gynecology Oncology   • [address] [place] [address] History   Problem Relation Age of Onset   • Tongue cancer Mother 76   • No Known Problems Father    • No Known Problems Sister    • No Known Problems Daughter    • No Known Problems Maternal Grandmother    • No Known Problems Maternal Grandfather    • No Known Problems Paternal Grandmother    • No Known Problems Paternal Grandfather    • Ovarian cancer Cousin 48   • No Known Problems Son    • No Known Problems Son    • No Known Problems Sister    • No Known Problems Paternal Aunt    • No Known Problems Paternal Aunt    • No Known Problems Paternal Aunt    • No Known Problems Paternal Aunt    • No Known Problems Paternal Aunt          Medications have been verified  Objective   Pulse 63   Temp (!) 96 °F (35 6 °C)   Resp 18   Ht 5' 3" (1 6 m)   Wt 58 1 kg (128 lb)   LMP 10/04/2020 (Approximate)   SpO2 99%   BMI 22 67 kg/m²        Physical Exam     Physical Exam  Vitals and nursing note reviewed  Constitutional:       Appearance: Normal appearance  She is normal weight  Cardiovascular:      Rate and Rhythm: Normal rate and regular rhythm  Pulses: Normal pulses  Heart sounds: Normal heart sounds  Pulmonary:      Effort: Pulmonary effort is normal       Breath sounds: Normal breath sounds  Musculoskeletal:         General: Normal range of motion  Cervical back: Normal range of motion  Comments: Left 3rd finger laceration   125cm finger tip    Skin:     General: Skin is warm  Capillary Refill: Capillary refill takes less than 2 seconds  Neurological:      General: No focal deficit present  Mental Status: She is alert and oriented to person, place, and time  Psychiatric:         Mood and Affect: Mood normal      Laceration repair    Date/Time: 1/5/2023 11:57 AM  Performed by: Fortunato Govea PA-C  Authorized by: Fortunato Govea PA-C   Consent: Verbal consent obtained  Written consent not obtained    Consent given by: patient  Patient identity confirmed: verbally with patient  Body area: upper extremity  Location details: left long finger  Laceration length: 0 1 cm  Foreign bodies: no foreign bodies  Nerve involvement: none  Vascular damage: no    Sedation:  Patient sedated: no      Wound Dehiscence:  Superficial Wound Dehiscence: simple closure      Procedure Details:  Irrigation solution: saline  Irrigation method: syringe  Amount of cleaning: extensive  Skin closure: 4-0 nylon  Number of sutures: 1  Technique: simple  Approximation: close  Approximation difficulty: simple  Dressing: 4x4 sterile gauze  Patient tolerance: patient tolerated the procedure well with no immediate complications

## 2023-01-19 ENCOUNTER — ANNUAL EXAM (OUTPATIENT)
Dept: OBGYN CLINIC | Facility: CLINIC | Age: 48
End: 2023-01-19

## 2023-01-19 VITALS
HEIGHT: 63 IN | BODY MASS INDEX: 23.74 KG/M2 | WEIGHT: 134 LBS | DIASTOLIC BLOOD PRESSURE: 70 MMHG | SYSTOLIC BLOOD PRESSURE: 110 MMHG

## 2023-01-19 DIAGNOSIS — Z12.31 ENCOUNTER FOR SCREENING MAMMOGRAM FOR MALIGNANT NEOPLASM OF BREAST: ICD-10-CM

## 2023-01-19 DIAGNOSIS — Z01.419 ENCOUNTER FOR ANNUAL ROUTINE GYNECOLOGICAL EXAMINATION: Primary | ICD-10-CM

## 2023-01-19 DIAGNOSIS — N83.201 BILATERAL OVARIAN CYSTS: ICD-10-CM

## 2023-01-19 DIAGNOSIS — B37.31 CANDIDIASIS OF VAGINA: ICD-10-CM

## 2023-01-19 DIAGNOSIS — N83.202 BILATERAL OVARIAN CYSTS: ICD-10-CM

## 2023-01-19 RX ORDER — FLUCONAZOLE 150 MG/1
150 TABLET ORAL ONCE
Qty: 1 TABLET | Refills: 0 | Status: SHIPPED | OUTPATIENT
Start: 2023-01-19 | End: 2023-01-19

## 2023-01-19 RX ORDER — ESTRADIOL 0.07 MG/D
1 FILM, EXTENDED RELEASE TRANSDERMAL 2 TIMES WEEKLY
Qty: 24 PATCH | Refills: 4 | Status: SHIPPED | OUTPATIENT
Start: 2023-01-19 | End: 2023-01-22

## 2023-01-19 NOTE — PROGRESS NOTES
Assessment/Plan:  Pap smear deferred due to low risk status  Encourage self breast examination as well as calcium supplementation  Continue annual mammogram   Continue estrogen replacement therapy  Recommend Diflucan x1 dose  Return to office in 1 year or as needed  No problem-specific Assessment & Plan notes found for this encounter  Diagnoses and all orders for this visit:    Encounter for annual routine gynecological examination    Encounter for screening mammogram for malignant neoplasm of breast    Bilateral ovarian cysts  -     estradiol (VIVELLE-DOT) 0 075 MG/24HR; Place 1 patch on the skin 2 (two) times a week    Candidiasis of vagina  -     fluconazole (DIFLUCAN) 150 mg tablet; Take 1 tablet (150 mg total) by mouth once for 1 dose          Subjective:      Patient ID: Sandy Beauchamp is a 50 y o  female  HPI     This is a pleasant 61-year-old female presents for her GYN exam   Her GYN history significant for RA-TLHBSO 1/2021 for bilateral complex ovarian cysts revealing benign cystadenoma  She has been on estrogen replacement therapy since then, transdermal   She does get some skin irritation  We did talk about oral at this point she would like to continue her current regimen  She denies any vaginal bleeding or spotting  No changes in bowel or bladder function  She has been in a monogamous relationship for over 13 years  Pap smears have been normal as well as hysterectomy/cervical specimen  She was on antibiotics approximately 1 week ago and then subsequently developed a yeast infection  She did use Monistat 2 nights prior with minimal improvement  She does exercise and eat healthy on a regular basis  We did discuss duration of estrogen replacement therapy, consider discontinuing in 2 to 3 years  All questions answered  External genitalia is evident of slight erythema      The following portions of the patient's history were reviewed and updated as appropriate: allergies, current medications, past family history, past medical history, past social history, past surgical history and problem list     Review of Systems   Constitutional: Negative for fatigue, fever and unexpected weight change  Respiratory: Negative for cough, chest tightness, shortness of breath and wheezing  Cardiovascular: Negative  Negative for chest pain and palpitations  Gastrointestinal: Negative  Negative for abdominal distention, abdominal pain, blood in stool, constipation, diarrhea, nausea and vomiting  Genitourinary: Negative  Negative for difficulty urinating, dyspareunia, dysuria, flank pain, frequency, genital sores, hematuria, pelvic pain, urgency, vaginal bleeding, vaginal discharge and vaginal pain  Skin: Negative for rash  Objective:      /70   Ht 5' 3" (1 6 m)   Wt 60 8 kg (134 lb)   LMP 10/04/2020 (Approximate)   BMI 23 74 kg/m²          Physical Exam  Constitutional:       Appearance: Normal appearance  She is well-developed  Cardiovascular:      Rate and Rhythm: Normal rate and regular rhythm  Pulmonary:      Effort: Pulmonary effort is normal       Breath sounds: Normal breath sounds  Chest:   Breasts:     Right: No inverted nipple, mass, nipple discharge, skin change or tenderness  Left: No inverted nipple, mass, nipple discharge, skin change or tenderness  Abdominal:      General: Bowel sounds are normal  There is no distension  Palpations: Abdomen is soft  Tenderness: There is no abdominal tenderness  There is no guarding or rebound  Genitourinary:     Labia:         Right: No rash, tenderness or lesion  Left: No rash, tenderness or lesion  Vagina: Normal  No signs of injury  No vaginal discharge or tenderness  Uterus: Absent  Adnexa:         Right: No mass, tenderness or fullness  Left: No mass, tenderness or fullness       Neurological:      Mental Status: She is alert and oriented to person, place, and time    Psychiatric:         Behavior: Behavior normal        The vagina is evident of slight estrogen deficiency  The cervix is surgically absent  The cuff is well supported  There is residual Monistat adherent to the vaginal mucosa

## 2023-01-20 DIAGNOSIS — N83.202 BILATERAL OVARIAN CYSTS: ICD-10-CM

## 2023-01-20 DIAGNOSIS — N83.201 BILATERAL OVARIAN CYSTS: ICD-10-CM

## 2023-01-22 RX ORDER — ESTRADIOL 0.07 MG/D
FILM, EXTENDED RELEASE TRANSDERMAL
Qty: 8 PATCH | Refills: 8 | Status: SHIPPED | OUTPATIENT
Start: 2023-01-22

## 2023-02-23 ENCOUNTER — HOSPITAL ENCOUNTER (INPATIENT)
Facility: HOSPITAL | Age: 48
LOS: 2 days | Discharge: HOME | End: 2023-02-26
Attending: EMERGENCY MEDICINE | Admitting: SURGERY

## 2023-02-23 DIAGNOSIS — K56.609 SBO (SMALL BOWEL OBSTRUCTION) (HCC): Primary | ICD-10-CM

## 2023-02-23 RX ORDER — SODIUM CHLORIDE, SODIUM GLUCONATE, SODIUM ACETATE, POTASSIUM CHLORIDE, MAGNESIUM CHLORIDE, SODIUM PHOSPHATE, DIBASIC, AND POTASSIUM PHOSPHATE .53; .5; .37; .037; .03; .012; .00082 G/100ML; G/100ML; G/100ML; G/100ML; G/100ML; G/100ML; G/100ML
1000 INJECTION, SOLUTION INTRAVENOUS ONCE
Status: COMPLETED | OUTPATIENT
Start: 2023-02-23 | End: 2023-02-24

## 2023-02-23 RX ORDER — ONDANSETRON 2 MG/ML
4 INJECTION INTRAMUSCULAR; INTRAVENOUS ONCE
Status: COMPLETED | OUTPATIENT
Start: 2023-02-23 | End: 2023-02-23

## 2023-02-23 RX ORDER — KETOROLAC TROMETHAMINE 30 MG/ML
15 INJECTION, SOLUTION INTRAMUSCULAR; INTRAVENOUS ONCE
Status: COMPLETED | OUTPATIENT
Start: 2023-02-23 | End: 2023-02-23

## 2023-02-23 RX ADMIN — SODIUM CHLORIDE, SODIUM GLUCONATE, SODIUM ACETATE, POTASSIUM CHLORIDE, MAGNESIUM CHLORIDE, SODIUM PHOSPHATE, DIBASIC, AND POTASSIUM PHOSPHATE 1000 ML: .53; .5; .37; .037; .03; .012; .00082 INJECTION, SOLUTION INTRAVENOUS at 23:45

## 2023-02-23 RX ADMIN — KETOROLAC TROMETHAMINE 15 MG: 30 INJECTION, SOLUTION INTRAMUSCULAR; INTRAVENOUS at 23:45

## 2023-02-23 RX ADMIN — ONDANSETRON 4 MG: 2 INJECTION INTRAMUSCULAR; INTRAVENOUS at 23:45

## 2023-02-24 ENCOUNTER — APPOINTMENT (INPATIENT)
Dept: RADIOLOGY | Facility: HOSPITAL | Age: 48
End: 2023-02-24

## 2023-02-24 ENCOUNTER — APPOINTMENT (EMERGENCY)
Dept: RADIOLOGY | Facility: HOSPITAL | Age: 48
End: 2023-02-24

## 2023-02-24 PROBLEM — K56.609 SBO (SMALL BOWEL OBSTRUCTION) (HCC): Status: ACTIVE | Noted: 2023-02-24

## 2023-02-24 LAB
ALBUMIN SERPL BCP-MCNC: 3.7 G/DL (ref 3.5–5)
ALP SERPL-CCNC: 58 U/L (ref 46–116)
ALT SERPL W P-5'-P-CCNC: 16 U/L (ref 12–78)
ANION GAP SERPL CALCULATED.3IONS-SCNC: 9 MMOL/L (ref 4–13)
AST SERPL W P-5'-P-CCNC: 11 U/L (ref 5–45)
ATRIAL RATE: 76 BPM
BASOPHILS # BLD AUTO: 0.06 THOUSANDS/ÂΜL (ref 0–0.1)
BASOPHILS # BLD AUTO: 0.08 THOUSANDS/ÂΜL (ref 0–0.1)
BASOPHILS NFR BLD AUTO: 1 % (ref 0–1)
BASOPHILS NFR BLD AUTO: 1 % (ref 0–1)
BILIRUB SERPL-MCNC: 0.75 MG/DL (ref 0.2–1)
BILIRUB UR QL STRIP: NEGATIVE
BUN SERPL-MCNC: 14 MG/DL (ref 5–25)
CALCIUM SERPL-MCNC: 9.8 MG/DL (ref 8.3–10.1)
CHLORIDE SERPL-SCNC: 98 MMOL/L (ref 96–108)
CLARITY UR: CLEAR
CO2 SERPL-SCNC: 27 MMOL/L (ref 21–32)
COLOR UR: ABNORMAL
CREAT SERPL-MCNC: 0.8 MG/DL (ref 0.6–1.3)
EOSINOPHIL # BLD AUTO: 0.14 THOUSAND/ÂΜL (ref 0–0.61)
EOSINOPHIL # BLD AUTO: 0.27 THOUSAND/ÂΜL (ref 0–0.61)
EOSINOPHIL NFR BLD AUTO: 1 % (ref 0–6)
EOSINOPHIL NFR BLD AUTO: 2 % (ref 0–6)
ERYTHROCYTE [DISTWIDTH] IN BLOOD BY AUTOMATED COUNT: 12.1 % (ref 11.6–15.1)
ERYTHROCYTE [DISTWIDTH] IN BLOOD BY AUTOMATED COUNT: 12.2 % (ref 11.6–15.1)
GFR SERPL CREATININE-BSD FRML MDRD: 86 ML/MIN/1.73SQ M
GLUCOSE SERPL-MCNC: 127 MG/DL (ref 65–140)
GLUCOSE UR STRIP-MCNC: NEGATIVE MG/DL
HCT VFR BLD AUTO: 44.6 % (ref 34.8–46.1)
HCT VFR BLD AUTO: 51.6 % (ref 34.8–46.1)
HGB BLD-MCNC: 15.2 G/DL (ref 11.5–15.4)
HGB BLD-MCNC: 17.8 G/DL (ref 11.5–15.4)
HGB UR QL STRIP.AUTO: NEGATIVE
IMM GRANULOCYTES # BLD AUTO: 0.03 THOUSAND/UL (ref 0–0.2)
IMM GRANULOCYTES # BLD AUTO: 0.08 THOUSAND/UL (ref 0–0.2)
IMM GRANULOCYTES NFR BLD AUTO: 0 % (ref 0–2)
IMM GRANULOCYTES NFR BLD AUTO: 1 % (ref 0–2)
KETONES UR STRIP-MCNC: ABNORMAL MG/DL
LACTATE SERPL-SCNC: 1.2 MMOL/L (ref 0.5–2)
LEUKOCYTE ESTERASE UR QL STRIP: NEGATIVE
LIPASE SERPL-CCNC: 98 U/L (ref 73–393)
LYMPHOCYTES # BLD AUTO: 2.25 THOUSANDS/ÂΜL (ref 0.6–4.47)
LYMPHOCYTES # BLD AUTO: 2.71 THOUSANDS/ÂΜL (ref 0.6–4.47)
LYMPHOCYTES NFR BLD AUTO: 19 % (ref 14–44)
LYMPHOCYTES NFR BLD AUTO: 20 % (ref 14–44)
MCH RBC QN AUTO: 32.3 PG (ref 26.8–34.3)
MCH RBC QN AUTO: 32.5 PG (ref 26.8–34.3)
MCHC RBC AUTO-ENTMCNC: 34.1 G/DL (ref 31.4–37.4)
MCHC RBC AUTO-ENTMCNC: 34.5 G/DL (ref 31.4–37.4)
MCV RBC AUTO: 94 FL (ref 82–98)
MCV RBC AUTO: 95 FL (ref 82–98)
MONOCYTES # BLD AUTO: 1.05 THOUSAND/ÂΜL (ref 0.17–1.22)
MONOCYTES # BLD AUTO: 1.25 THOUSAND/ÂΜL (ref 0.17–1.22)
MONOCYTES NFR BLD AUTO: 9 % (ref 4–12)
MONOCYTES NFR BLD AUTO: 9 % (ref 4–12)
NEUTROPHILS # BLD AUTO: 10.31 THOUSANDS/ÂΜL (ref 1.85–7.62)
NEUTROPHILS # BLD AUTO: 7.49 THOUSANDS/ÂΜL (ref 1.85–7.62)
NEUTS SEG NFR BLD AUTO: 68 % (ref 43–75)
NEUTS SEG NFR BLD AUTO: 69 % (ref 43–75)
NITRITE UR QL STRIP: NEGATIVE
NRBC BLD AUTO-RTO: 0 /100 WBCS
NRBC BLD AUTO-RTO: 0 /100 WBCS
P AXIS: 19 DEGREES
PH UR STRIP.AUTO: 5.5 [PH]
PLATELET # BLD AUTO: 208 THOUSANDS/UL (ref 149–390)
PLATELET # BLD AUTO: 404 THOUSANDS/UL (ref 149–390)
PMV BLD AUTO: 9.9 FL (ref 8.9–12.7)
PMV BLD AUTO: 9.9 FL (ref 8.9–12.7)
POTASSIUM SERPL-SCNC: 4.1 MMOL/L (ref 3.5–5.3)
PR INTERVAL: 112 MS
PROT SERPL-MCNC: 7 G/DL (ref 6.4–8.4)
PROT UR STRIP-MCNC: NEGATIVE MG/DL
QRS AXIS: 80 DEGREES
QRSD INTERVAL: 72 MS
QT INTERVAL: 374 MS
QTC INTERVAL: 420 MS
RBC # BLD AUTO: 4.71 MILLION/UL (ref 3.81–5.12)
RBC # BLD AUTO: 5.48 MILLION/UL (ref 3.81–5.12)
SODIUM SERPL-SCNC: 134 MMOL/L (ref 135–147)
SP GR UR STRIP.AUTO: 1.02 (ref 1–1.03)
T WAVE AXIS: 80 DEGREES
UROBILINOGEN UR STRIP-ACNC: <2 MG/DL
VENTRICULAR RATE: 76 BPM
WBC # BLD AUTO: 11.15 THOUSAND/UL (ref 4.31–10.16)
WBC # BLD AUTO: 14.57 THOUSAND/UL (ref 4.31–10.16)

## 2023-02-24 RX ORDER — ENOXAPARIN SODIUM 100 MG/ML
40 INJECTION SUBCUTANEOUS DAILY
Status: DISCONTINUED | OUTPATIENT
Start: 2023-02-24 | End: 2023-02-26 | Stop reason: HOSPADM

## 2023-02-24 RX ORDER — SODIUM CHLORIDE, SODIUM GLUCONATE, SODIUM ACETATE, POTASSIUM CHLORIDE, MAGNESIUM CHLORIDE, SODIUM PHOSPHATE, DIBASIC, AND POTASSIUM PHOSPHATE .53; .5; .37; .037; .03; .012; .00082 G/100ML; G/100ML; G/100ML; G/100ML; G/100ML; G/100ML; G/100ML
1000 INJECTION, SOLUTION INTRAVENOUS ONCE
Status: COMPLETED | OUTPATIENT
Start: 2023-02-24 | End: 2023-02-24

## 2023-02-24 RX ORDER — SODIUM CHLORIDE, SODIUM GLUCONATE, SODIUM ACETATE, POTASSIUM CHLORIDE, MAGNESIUM CHLORIDE, SODIUM PHOSPHATE, DIBASIC, AND POTASSIUM PHOSPHATE .53; .5; .37; .037; .03; .012; .00082 G/100ML; G/100ML; G/100ML; G/100ML; G/100ML; G/100ML; G/100ML
100 INJECTION, SOLUTION INTRAVENOUS CONTINUOUS
Status: DISPENSED | OUTPATIENT
Start: 2023-02-24 | End: 2023-02-26

## 2023-02-24 RX ORDER — ONDANSETRON 2 MG/ML
4 INJECTION INTRAMUSCULAR; INTRAVENOUS EVERY 6 HOURS PRN
Status: DISCONTINUED | OUTPATIENT
Start: 2023-02-24 | End: 2023-02-26 | Stop reason: HOSPADM

## 2023-02-24 RX ADMIN — Medication 1 SPRAY: at 10:39

## 2023-02-24 RX ADMIN — IOHEXOL 100 ML: 350 INJECTION, SOLUTION INTRAVENOUS at 00:54

## 2023-02-24 RX ADMIN — SODIUM CHLORIDE, SODIUM GLUCONATE, SODIUM ACETATE, POTASSIUM CHLORIDE AND MAGNESIUM CHLORIDE 100 ML/HR: 526; 502; 368; 37; 30 INJECTION, SOLUTION INTRAVENOUS at 05:10

## 2023-02-24 RX ADMIN — SODIUM CHLORIDE, SODIUM GLUCONATE, SODIUM ACETATE, POTASSIUM CHLORIDE, MAGNESIUM CHLORIDE, SODIUM PHOSPHATE, DIBASIC, AND POTASSIUM PHOSPHATE 1000 ML: .53; .5; .37; .037; .03; .012; .00082 INJECTION, SOLUTION INTRAVENOUS at 03:24

## 2023-02-24 RX ADMIN — Medication 1 SPRAY: at 05:55

## 2023-02-24 RX ADMIN — Medication 1 SPRAY: at 08:13

## 2023-02-24 NOTE — PLAN OF CARE
Problem: Prexisting or High Potential for Compromised Skin Integrity  Goal: Skin integrity is maintained or improved  Description: INTERVENTIONS:  - Identify patients at risk for skin breakdown  - Assess and monitor skin integrity  - Assess and monitor nutrition and hydration status  - Monitor labs   - Assess for incontinence   - Turn and reposition patient  - Assist with mobility/ambulation  - Relieve pressure over bony prominences  - Avoid friction and shearing  - Provide appropriate hygiene as needed including keeping skin clean and dry  - Evaluate need for skin moisturizer/barrier cream  - Collaborate with interdisciplinary team   - Patient/family teaching  - Consider wound care consult   2/24/2023 1139 by Daniel Donahue RN  Outcome: Progressing  2/24/2023 1138 by Daniel Donahue RN  Outcome: Progressing     Problem: PAIN - ADULT  Goal: Verbalizes/displays adequate comfort level or baseline comfort level  Description: Interventions:  - Encourage patient to monitor pain and request assistance  - Assess pain using appropriate pain scale  - Administer analgesics based on type and severity of pain and evaluate response  - Implement non-pharmacological measures as appropriate and evaluate response  - Consider cultural and social influences on pain and pain management  - Notify physician/advanced practitioner if interventions unsuccessful or patient reports new pain  Outcome: Progressing     Problem: INFECTION - ADULT  Goal: Absence or prevention of progression during hospitalization  Description: INTERVENTIONS:  - Assess and monitor for signs and symptoms of infection  - Monitor lab/diagnostic results  - Monitor all insertion sites, i e  indwelling lines, tubes, and drains  - Monitor endotracheal if appropriate and nasal secretions for changes in amount and color  - [place] appropriate cooling/warming therapies per order  - Administer medications as ordered  - Instruct and encourage patient and family to use good hand hygiene technique  - Identify and instruct in appropriate isolation precautions for identified infection/condition  Outcome: Progressing  Goal: Absence of fever/infection during neutropenic period  Description: INTERVENTIONS:  - Monitor WBC    Outcome: Progressing     Problem: SAFETY ADULT  Goal: Patient will remain free of falls  Description: INTERVENTIONS:  - Educate patient/family on patient safety including physical limitations  - Instruct patient to call for assistance with activity   - Consult OT/PT to assist with strengthening/mobility   - Keep Call bell within reach  - Keep bed low and locked with side rails adjusted as appropriate  - Keep care items and personal belongings within reach  - Initiate and maintain comfort rounds  - Make Fall Risk Sign visible to staff  - Offer Toileting every 2 Hours, in advance of need  - Obtain necessary fall risk management equipment: yellow socks   - Apply yellow socks and bracelet for high fall risk patients  - Consider moving patient to room near nurses station  Outcome: Progressing  Goal: Maintain or return to baseline ADL function  Description: INTERVENTIONS:  -  Assess patient's ability to carry out ADLs; assess patient's baseline for ADL function and identify physical deficits which impact ability to perform ADLs (bathing, care of mouth/teeth, toileting, grooming, dressing, etc )  - Assess/evaluate cause of self-care deficits   - Assess range of motion  - Assess patient's mobility; develop plan if impaired  - Assess patient's need for assistive devices and provide as appropriate  - Encourage maximum independence but intervene and supervise when necessary  - Involve family in performance of ADLs  - Assess for home care needs following discharge   - Consider OT consult to assist with ADL evaluation and planning for discharge  - Provide patient education as appropriate  Outcome: Progressing  Goal: Maintains/Returns to pre admission functional level  Description: INTERVENTIONS:  - Perform BMAT or MOVE assessment daily    - Set and communicate daily mobility goal to care team and patient/family/caregiver  - Collaborate with rehabilitation services on mobility goals if consulted  - OOB as able, tolerating ambulation with NG tube in place  - Out of bed for toileting  - Record patient progress and toleration of activity level   Outcome: Progressing     Problem: DISCHARGE PLANNING  Goal: Discharge to home or other facility with appropriate resources  Description: INTERVENTIONS:  - Identify barriers to discharge w/patient and caregiver  - Arrange for needed discharge resources and transportation as appropriate  - Identify discharge learning needs (meds, wound care, etc )  - Arrange for interpretive services to assist at discharge as needed  - Refer to Case Management Department for coordinating discharge planning if the patient needs post-hospital services based on physician/advanced practitioner order or complex needs related to functional status, cognitive ability, or social support system  Outcome: Progressing     Problem: Knowledge Deficit  Goal: Patient/family/caregiver demonstrates understanding of disease process, treatment plan, medications, and discharge instructions  Description: Complete learning assessment and assess knowledge base    Interventions:  - Provide teaching at level of understanding  - Provide teaching via preferred learning methods  Outcome: Progressing

## 2023-02-24 NOTE — QUICK NOTE
Nurse-Patient-Provider rounds were completed with the patient's nurse today, Lydia Pulido  We discussed the plan is to keep the patient n p o  with NG tube in place for ongoing bowel decompression  Continue IV fluids for hydration and resuscitation  Repeat CBC demonstrated improvement in leukocytosis  Continue to monitor abdominal exam while awaiting return of normal bowel function  Monitor temperature trend and labs including CBC, BMP and magnesium with plan to replete electrolytes as indicated  Provided patient maintains a benign abdominal exam and demonstrates adequate gastric and bowel decompression with improvement overnight, consider Gastrografin challenge on 2/25/2023  We reviewed all of the invasive devices/lines/telemetry orders   - None  DVT Prophylaxis:  - Continue Lovenox  Pain Assessment / Plan:  - Continue current analgesic regimen  Mobility Assessment / Plan:  - Activity as tolerated  Goals / Barriers for discharge:  - Not appropriate for discharge at this time  - Case management following; case and discharge needs discussed  All questions and concerns were addressed  I spent greater than 15 minutes reviewing the plan with the patient and the nurse, and coordinating care for the day      Jarda Kemp PA-C  2/24/2023 12:05 PM

## 2023-02-24 NOTE — ED NOTES
Patient ambulated to restroom at this time     Michael Le [address], [address] [facility]  02/24/23 6961

## 2023-02-24 NOTE — ED PROVIDER NOTES
History  Chief Complaint   Patient presents with   • Abdominal Pain     Pt was complaining of onset stomach pain/cramping feeling since Tuesday morning, radiates to her lower back, no appetite, nauseous, and pt is unable to pass gas     64-year-old female with a history of GERD, cholecystectomy, hysterectomy and BSO who presents complaining of generalized abdominal pain worse in the right lower quadrant, nausea and vomiting, and subjective fevers for the past 3 days  The patient states that her pain was initially diffuse and crampy in nature but has since localized to the right lower quadrant and is occasionally sharp  Patient states that on Tuesday she had multiple episodes of nonbloody vomiting, and has had nausea since but no vomiting  Patient states that she has had decreased appetite  Patient states that she has had intermittent subjective fevers but has not checked her temperature at home  The patient states that she has not had a bowel movement or pass gas since her symptoms began  The patient denies dysuria and hematuria  Prior to Admission Medications   Prescriptions Last Dose Informant Patient Reported? Taking? BIOTIN PO   Yes No   Sig: Take by mouth daily   Bacillus Coagulans-Inulin (Probiotic) 1-250 BILLION-MG CAPS   Yes No   Sig: Take by mouth   Cholecalciferol (VITAMIN D3 PO)   Yes No   Sig: Take by mouth daily   Cranberry 1000 MG CAPS   Yes No   Sig: Take by mouth daily   Patient not taking: Reported on 2412   FOLIC ACID PO   Yes No   Sig: Take by mouth daily   MILK THISTLE PO   Yes No   Sig: Take by mouth daily   Turmeric 500 MG CAPS   Yes No   Sig: Take by mouth   estradiol (VIVELLE-DOT) 0 075 MG/24HR   No No   Sig: PLACE 1 PATCH ON THE SKIN 2 TIMES A WEEK     fluticasone (FLONASE) 50 mcg/act nasal spray   No No   Si spray into each nostril daily   Patient not taking: Reported on 2022   loratadine (CLARITIN) 10 mg tablet   Yes No   Sig: Take 10 mg by mouth daily in the early morning    polyethylene glycol (GOLYTELY) 4000 mL solution   No No   Sig: Take 4,000 mL by mouth once for 1 dose Take as directed by office   Patient not taking: Reported on 2023   pseudoephedrine-guaifenesin ([place] [address] D)  MG per tablet   Yes No   Sig: Take 1 tablet by mouth as needed      Facility-Administered Medications: None       Past Medical History:   Diagnosis Date   • Gastritis     Age 19   • GERD (gastroesophageal reflux disease)    • Heart murmur        Past Surgical History:   Procedure Laterality Date   • EGD     • HYSTERECTOMY     • LAPAROSCOPIC CHOLECYSTECTOMY     • OOPHORECTOMY     • DC LAPS TOTAL HYSTERECT 250 GM/< W/RMVL TUBE/OVARY N/A 2021    Procedure: ROBOTIC TOTAL LAPAROSCOPIC HYSTERECTOMY, BILATERAL SALPINGO-OOPHORECTOMY;  Surgeon: Krystian Jasso MD;  Location: [facility];  Service: Gynecology Oncology   • WISDOM TOOTH EXTRACTION         Family History   Problem Relation Age of Onset   • Tongue cancer Mother 76   • No Known Problems Father    • No Known Problems Sister    • No Known Problems Daughter    • No Known Problems Maternal Grandmother    • No Known Problems Maternal Grandfather    • No Known Problems Paternal Grandmother    • No Known Problems Paternal Grandfather    • Ovarian cancer Cousin 48   • No Known Problems Son    • No Known Problems Son    • No Known Problems Sister    • No Known Problems Paternal Aunt    • No Known Problems Paternal Aunt    • No Known Problems Paternal Aunt    • No Known Problems Paternal Aunt    • No Known Problems Paternal Aunt      I have reviewed and agree with the history as documented      E-Cigarette/Vaping   • E-Cigarette Use Never User      E-Cigarette/Vaping Substances   • Nicotine No    • THC No    • CBD No    • Flavoring No    • Other No    • Unknown No      Social History     Tobacco Use   • Smoking status: Former     Types: Cigarettes     Quit date:      Years since quittin 1   • Smokeless tobacco: Never Vaping Use   • Vaping Use: Never used   Substance Use Topics   • Alcohol use: Yes     Alcohol/week: 10 0 standard drinks     Types: 9 Glasses of wine, 1 Cans of beer per week     Comment: daily    • Drug use: No        Review of Systems   Constitutional: Positive for appetite change, chills and fever  Negative for diaphoresis  HENT: Negative for congestion and sore throat  Eyes: Negative for pain and redness  Respiratory: Negative for cough and shortness of breath  Cardiovascular: Negative for chest pain, palpitations and leg swelling  Gastrointestinal: Positive for abdominal pain, nausea and vomiting  Negative for diarrhea  Genitourinary: Negative for dysuria, flank pain and hematuria  Musculoskeletal: Negative for arthralgias and myalgias  Skin: Negative for color change, pallor and rash  Neurological: Negative for dizziness, syncope, weakness, light-headedness, numbness and headaches  All other systems reviewed and are negative  Physical Exam  ED Triage Vitals   Temperature Pulse Respirations Blood Pressure SpO2   02/23/23 2140 02/23/23 2140 02/23/23 2140 02/23/23 2140 02/23/23 2140   97 7 °F (36 5 °C) (!) 109 17 125/82 95 %      Temp Source Heart Rate Source Patient Position - Orthostatic VS BP Location FiO2 (%)   02/23/23 2140 02/23/23 2140 02/24/23 0112 02/24/23 0112 --   Oral Monitor Lying Right arm       Pain Score       02/23/23 2140       8             Orthostatic Vital Signs  Vitals:    02/24/23 0112 02/24/23 0115 02/24/23 0200 02/24/23 0315   BP: 104/62 104/62 115/70 115/70   Pulse: 77 74 92 94   Patient Position - Orthostatic VS: Lying          Physical Exam  Vitals and nursing note reviewed  Constitutional:       General: She is not in acute distress  Appearance: Normal appearance  She is not ill-appearing, toxic-appearing or diaphoretic  HENT:      Head: Normocephalic and atraumatic  Nose: Nose normal  No congestion or rhinorrhea        Mouth/Throat: Mouth: Mucous membranes are moist       Pharynx: Oropharynx is clear  Eyes:      General: No scleral icterus  Extraocular Movements: Extraocular movements intact  Conjunctiva/sclera: Conjunctivae normal       Pupils: Pupils are equal, round, and reactive to light  Cardiovascular:      Rate and Rhythm: Normal rate and regular rhythm  Pulses: Normal pulses  Heart sounds: Normal heart sounds  No murmur heard  No friction rub  No gallop  Pulmonary:      Effort: Pulmonary effort is normal       Breath sounds: Normal breath sounds  No wheezing, rhonchi or rales  Abdominal:      General: Abdomen is flat  There is distension  Palpations: Abdomen is soft  Tenderness: There is generalized abdominal tenderness  There is no right CVA tenderness, left CVA tenderness, guarding or rebound  Comments: Generalized tenderness worst in the RLQ  Musculoskeletal:         General: No swelling, tenderness, deformity or signs of injury  Normal range of motion  Cervical back: Normal range of motion and neck supple  No rigidity or tenderness  Right lower leg: No edema  Left lower leg: No edema  Lymphadenopathy:      Cervical: No cervical adenopathy  Skin:     General: Skin is warm and dry  Capillary Refill: Capillary refill takes less than 2 seconds  Coloration: Skin is not jaundiced or pale  Findings: No bruising, erythema, lesion or rash  Neurological:      General: No focal deficit present  Mental Status: She is alert and oriented to person, place, and time           ED Medications  Medications   phenol (CHLORASEPTIC) 1 4 % mucosal liquid 1 spray (has no administration in time range)   multi-electrolyte (PLASMALYTE-A/ISOLYTE-S PH 7 4) IV solution (has no administration in time range)   multi-electrolyte (ISOLYTE-S PH 7 4) bolus 1,000 mL (has no administration in time range)   ondansetron (ZOFRAN) injection 4 mg (has no administration in time range) enoxaparin (LOVENOX) subcutaneous injection 40 mg (has no administration in time range)   multi-electrolyte (ISOLYTE-S PH 7 4) bolus 1,000 mL (0 mL Intravenous Stopped 2/24/23 0045)   ondansetron (ZOFRAN) injection 4 mg (4 mg Intravenous Given 2/23/23 2345)   ketorolac (TORADOL) injection 15 mg (15 mg Intravenous Given 2/23/23 2345)   iohexol (OMNIPAQUE) 350 MG/ML injection (SINGLE-DOSE) 100 mL (100 mL Intravenous Given 2/24/23 0054)       Diagnostic Studies  Results Reviewed     Procedure Component Value Units Date/Time    Lactic acid, plasma [[id number]]     Lab Status: No result Specimen: Blood     UA (URINE) with reflex to Scope [[id number]]  (Abnormal) Collected: 02/24/23 0052    Lab Status: Final result Specimen: Urine, Clean Catch Updated: 02/24/23 0102     Color, UA Light Yellow     Clarity, UA Clear     Specific Gravity, UA 1 018     pH, UA 5 5     Leukocytes, UA Negative     Nitrite, UA Negative     Protein, UA Negative mg/dl      Glucose, UA Negative mg/dl      Ketones, UA 20 (1+) mg/dl      Urobilinogen, UA <2 0 mg/dl      Bilirubin, UA Negative     Occult Blood, UA Negative    Comprehensive metabolic panel [[id number]]  (Abnormal) Collected: 02/23/23 2349    Lab Status: Final result Specimen: Blood from Arm, Left Updated: 02/24/23 0034     Sodium 134 mmol/L      Potassium 4 1 mmol/L      Chloride 98 mmol/L      CO2 27 mmol/L      ANION GAP 9 mmol/L      BUN 14 mg/dL      Creatinine 0 80 mg/dL      Glucose 127 mg/dL      Calcium 9 8 mg/dL      AST 11 U/L      ALT 16 U/L      Alkaline Phosphatase 58 U/L      Total Protein 7 0 g/dL      Albumin 3 7 g/dL      Total Bilirubin 0 75 mg/dL      eGFR 86 ml/min/1 73sq m     Narrative:      Miguel A guidelines for Chronic Kidney Disease (CKD):   •  Stage 1 with normal or high GFR (GFR > 90 mL/min/1 73 square meters)  •  Stage 2 Mild CKD (GFR = 60-89 mL/min/1 73 square meters)  •  Stage 3A Moderate CKD (GFR = 45-59 mL/min/1 73 square meters)  •  Stage 3B Moderate CKD (GFR = 30-44 mL/min/1 73 square meters)  •  Stage 4 Severe CKD (GFR = 15-29 mL/min/1 73 square meters)  •  Stage 5 End Stage CKD (GFR <15 mL/min/1 73 square meters)  Note: GFR calculation is accurate only with a steady state creatinine    Lipase [[id number]]  (Normal) Collected: 02/23/23 2349    Lab Status: Final result Specimen: Blood from Arm, Left Updated: 02/24/23 0034     Lipase 98 u/L     CBC and differential [[id number]]  (Abnormal) Collected: 02/23/23 2349    Lab Status: Final result Specimen: Blood from Arm, Left Updated: 02/24/23 0005     WBC 14 57 Thousand/uL      RBC 5 48 Million/uL      Hemoglobin 17 8 g/dL      Hematocrit 51 6 %      MCV 94 fL      MCH 32 5 pg      MCHC 34 5 g/dL      RDW 12 1 %      MPV 9 9 fL      Platelets 578 Thousands/uL      nRBC 0 /100 WBCs      Neutrophils Relative 69 %      Immat GRANS % 1 %      Lymphocytes Relative 19 %      Monocytes Relative 9 %      Eosinophils Relative 1 %      Basophils Relative 1 %      Neutrophils Absolute 10 31 Thousands/µL      Immature Grans Absolute 0 08 Thousand/uL      Lymphocytes Absolute 2 71 Thousands/µL      Monocytes Absolute 1 25 Thousand/µL      Eosinophils Absolute 0 14 Thousand/µL      Basophils Absolute 0 08 Thousands/µL                  CT abdomen pelvis with contrast   Final Result by Velia Murphy MD (02/24 0155)      Acute high-grade small bowel obstruction with transition point in the right lower quadrant (601:53)  No free intraperitoneal air  Surgical consult advised  Moderate volume abdominopelvic ascites  Linear metallic density within the inferior right hepatic lobe possibly reflecting? Migrated surgical clip  Above findings discussed with Dr Stephanie Pike at 1:52 AM on 2/24/2023        Workstation performed: [id number]         XR abdomen 1 view kub    (Results Pending)         Procedures  Procedures      ED Course                                       Medical Decision Making  15-year-old female with a history of GERD, cholecystectomy, hysterectomy and BSO who presents complaining of generalized abdominal pain worse in the right lower quadrant, nausea and vomiting, and subjective fevers for the past 3 days  The patient has not had a bowel movement or passed gas since her symptoms began  Vitals are within the normal limits  On exam the patient is well-appearing, heart is regular rate and rhythm, lungs are clear to auscultation bilaterally, abdomen is distended but soft with diffuse tenderness worst in the right lower quadrant but no rebound or guarding, no lower extremity edema  Suspect appendicitis  Also considering bowel obstruction, diverticulitis, and pancreatitis  Will order CBC, CMP, lipase, UA,, CT of the abdomen and pelvis with IV contrast   We will treat the patient's symptoms with Toradol, Zofran, and IV fluids  The patient states that her pain is significantly improved after treatment  EKG rate 76, sinus rhythm, normal axis, normal intervals, no ST elevation or depression, similar to prior  White blood cell count is 14 57   UA shows +1 ketones but is otherwise unremarkable  The remainder the patient's lab work is unremarkable  CT of the abdomen and pelvis is read as "Acute high-grade small bowel obstruction with transition point in the right lower quadrant (601:53)  No free intraperitoneal air  Surgical consult advised  Moderate volume abdominopelvic ascites "  The patient is discussed with Dr Madelaine Barbour with general surgery and admitted to their service for high-grade small bowel obstruction  SBO (small bowel obstruction) ([facility] [address] ): acute illness or injury  Amount and/or Complexity of Data Reviewed  Labs: ordered  Radiology: ordered  Risk  Prescription drug management  Decision regarding hospitalization              Disposition  Final diagnoses:   [facility]) [facility] )     Time reflects when diagnosis was documented in both MDM as applicable and the Disposition within this note     Time User Action Codes Description Comment    2/24/2023  1:57 AM Chase Gupta Add [K56 609] SBO (small bowel obstruction) [facility])       ED Disposition     ED Disposition   Admit    Condition   Stable    Date/Time   Fri Feb 24, 2023  3:03 AM    Comment   Case was discussed with Dr Tian Polanco and the patient's admission status was agreed to be Admission Status: inpatient status to the service of Dr Tian Polanco  Follow-up Information    None         Patient's Medications   Discharge Prescriptions    No medications on file     No discharge procedures on file  PDMP Review       Value Time User    PDMP Reviewed  Yes 2/4/2021 11:53 AM Kris Mckenna MD           ED Provider  Attending physically available and evaluated Margaret Love I managed the patient along with the ED Attending      Electronically Signed by         Cash Marley,   02/24/23 0052

## 2023-02-24 NOTE — ED ATTENDING ATTESTATION
2/23/2023  IRajesh MD, saw and evaluated the patient  I have discussed the patient with the resident/non-physician practitioner and agree with the resident's/non-physician practitioner's findings, Plan of Care, and MDM as documented in the resident's/non-physician practitioner's note, except where noted  All available labs and Radiology studies were reviewed  I was present for key portions of any procedure(s) performed by the resident/non-physician practitioner and I was immediately available to provide assistance  At this point I agree with the current assessment done in the Emergency Department  I have conducted an independent evaluation of this patient a history and physical is as follows:    ED Course         Critical Care Time  CriticalCare Time  Performed by: Rajesh Lubin MD  Authorized by: Rajesh Lubin MD     Critical care provider statement:     Critical care time (minutes):  31    Critical care time was exclusive of:  Separately billable procedures and treating other patients and teaching time    Critical care was necessary to treat or prevent imminent or life-threatening deterioration of the following conditions:  Dehydration (high grade small bowel obstruction)    Critical care was time spent personally by me on the following activities:  Discussions with consultants, evaluation of patient's response to treatment, examination of patient, re-evaluation of patient's condition, review of old charts, obtaining history from patient or surrogate and development of treatment plan with patient or surrogate        45 yo female with hx of choley, hysterectomy, c/o crampy abdominal pain for two days worse in rlq, worsening over last two days  Nausea, vomiting on Tuesday, no bm for two days  Pt with subjective fever  No urinary symptoms  No cp, no sob  Vss, afebrile, lungs cta, rrr, abdomen soft generalized tenderness, no rebound, no guarding  Labs, ct a/p  Ivf, zofran, pain meds

## 2023-02-24 NOTE — H&P
H&P Exam - General Surgery   Emmanuel Washington 46 y o  female MRN: [id number]  Unit/Bed#: ED 28 Encounter: [id number]    Assessment/Plan     Assessment:  45 yo female with small bowel obstruction  CT findings with dilated small bowel and a transition point in the right lower quadrant  Leukocytosis to 14 5 with pending lactic acid level  We recommended a trial of medical management and discussed what surgical management would entail if the need arises  NG tube placed in the ED with immediate return of 300 cc bilious fluid  Plan:  - admit to Acute Care Surgery  - NPO  - give a second 1L isolyte bolus, then start isolyte @ 100 cc/hr  - will check lactic acid  - NGT to low continuous suction  - serial abdominal exams    History of Present Illness     HPI:  Emmanuel Washington is a 46 y o  female who presents with abdominal pain and constipation  She noticed new onset abdominal pain in the right lower quadrant on Tuesday 2/21 with associated nausea and vomiting  Throughout the day she felt the urge to defecate, but was unable to  Since then she has had persistent nausea, increasing abdominal pain and distention  Her last bowel movement was Tuesday morning  She has only intermittently been passing gas, and has not had a bowel movement since then  Subjective fevers and chills  No history of similar symptoms, no history of bowel obstructions  Nothing significant to eat or drink since Tuesday  Past surgical history significant for a laparoscopic total hysterectomy (02/2021) and laparoscopic cholecystectomy (1992)  Medical history only significant for GERD  She takes no medications on a regular basis  Prior to evaluation in the ED she received a 1 L isolyte bolus and 1x dose of IV toradol  Review of Systems   Constitutional: Positive for appetite change, chills and fever  Respiratory: Negative for cough and shortness of breath  Cardiovascular: Negative for chest pain     Gastrointestinal: Positive for abdominal distention, abdominal pain, constipation and nausea  Negative for diarrhea and vomiting  Genitourinary: Negative for difficulty urinating and dysuria  Skin: Negative for color change  All other systems reviewed and are negative        Historical Information   Past Medical History:   Diagnosis Date   • Gastritis     Age 19   • GERD (gastroesophageal reflux disease)    • Heart murmur      Past Surgical History:   Procedure Laterality Date   • EGD     • HYSTERECTOMY     • LAPAROSCOPIC CHOLECYSTECTOMY     • OOPHORECTOMY     • NM LAPS TOTAL HYSTERECT 250 GM/< W/RMVL TUBE/OVARY N/A 2021    Procedure: ROBOTIC TOTAL LAPAROSCOPIC HYSTERECTOMY, BILATERAL SALPINGO-OOPHORECTOMY;  Surgeon: Laura Smith MD;  Location: [facility];  Service: Gynecology Oncology   • [address] EXTRACTION       Social History   Social History     Substance and Sexual Activity   Alcohol Use Yes   • Alcohol/week: 10 0 standard drinks   • Types: 9 Glasses of wine, 1 Cans of beer per week    Comment: daily      Social History     Substance and Sexual Activity   Drug Use No     Social History     Tobacco Use   Smoking Status Former   • Types: Cigarettes   • Quit date:    • Years since quittin 1   Smokeless Tobacco Never     E-Cigarette/Vaping   • E-Cigarette Use Never User      E-Cigarette/Vaping Substances   • Nicotine No    • THC No    • CBD No    • Flavoring No    • Other No    • Unknown No      Family History: non-contributory    Meds/Allergies   all medications and allergies reviewed  Allergies   Allergen Reactions   • Soybean Oil - Food Allergy GI Intolerance       Objective   First Vitals:   Blood Pressure: 125/82 (23)  Pulse: (!) 109 (23)  Temperature: 97 7 °F (36 5 °C) (23)  Temp Source: Oral (23)  Respirations: 17 (23)  Height: 5' 3" (160 cm) (23)  Weight - Scale: 58 1 kg (128 lb) (23)  SpO2: 95 % (23)    Current Vitals: Blood Pressure: 115/70 (02/24/23 0315)  Pulse: 94 (02/24/23 0315)  Temperature: 97 7 °F (36 5 °C) (02/23/23 2140)  Temp Source: Oral (02/23/23 2140)  Respirations: 20 (02/24/23 0315)  Height: 5' 3" (160 cm) (02/23/23 2140)  Weight - Scale: 58 1 kg (128 lb) (02/23/23 2140)  SpO2: 95 % (02/24/23 0315)      Intake/Output Summary (Last 24 hours) at 2/24/2023 0316  Last data filed at 2/24/2023 0045  Gross per 24 hour   Intake 1000 ml   Output --   Net 1000 ml       Invasive Devices     Peripheral Intravenous Line  Duration           Peripheral IV 02/23/23 Left Antecubital <1 day          Drain  Duration           NG/OG/Enteral Tube Nasogastric 18 Fr Right nare <1 day                Physical Exam  Vitals reviewed  Constitutional:       General: She is in acute distress  Appearance: She is normal weight  She is not ill-appearing  HENT:      Mouth/Throat:      Mouth: Mucous membranes are dry  Cardiovascular:      Rate and Rhythm: Regular rhythm  Tachycardia present  Pulmonary:      Effort: Pulmonary effort is normal  No respiratory distress  Abdominal:      Comments: Distended, soft, generalized tenderness, slight rebound tenderness, no guarding   Musculoskeletal:      Right lower leg: No edema  Left lower leg: No edema  Skin:     General: Skin is warm and dry  Coloration: Skin is not jaundiced or pale  Neurological:      Mental Status: She is alert and oriented to person, place, and time  Lab Results:   I have personally reviewed pertinent lab results    , CBC:   Lab Results   Component Value Date    WBC 14 57 (H) 02/23/2023    HGB 17 8 (H) 02/23/2023    HCT 51 6 (H) 02/23/2023    MCV 94 02/23/2023     (H) 02/23/2023    MCH 32 5 02/23/2023    MCHC 34 5 02/23/2023    RDW 12 1 02/23/2023    MPV 9 9 02/23/2023    NRBC 0 02/23/2023   , CMP:   Lab Results   Component Value Date    SODIUM 134 (L) 02/23/2023    K 4 1 02/23/2023    CL 98 02/23/2023    CO2 27 02/23/2023    BUN 14 02/23/2023    CREATININE 0 80 02/23/2023    CALCIUM 9 8 02/23/2023    AST 11 02/23/2023    ALT 16 02/23/2023    ALKPHOS 58 02/23/2023    EGFR 86 02/23/2023     Imaging: I have personally reviewed pertinent reports  and I have personally reviewed pertinent films in PACS  EKG, Pathology, and Other Studies: I have personally reviewed pertinent reports        Code Status: No Order  Advance Directive and Living Will:      Power of :    POLST:

## 2023-02-24 NOTE — CASE MANAGEMENT
Case Management Assessment & Discharge Planning Note    Patient name Woody Sheldon  Location [address] [place] [address] 817/PPHP [id number] MRN [id number]  : 1975 Date 2023       Current Admission Date: 2023  Current Admission Diagnosis:SBO (small bowel obstruction) [facility])   Patient Active Problem List    Diagnosis Date Noted   • SBO (small bowel obstruction) ([address] ) 2023   • Occipital neuralgia of left side 2022   • Allergic rhinitis 2021   • Palpitation 2021   • Heart murmur 2021   • Surgical menopause 2021   • Bilateral ovarian cysts 2021   • Exposure to SARS-associated coronavirus 2020   • Acute non-recurrent frontal sinusitis 2020   • Acute bacterial conjunctivitis of both eyes 2020      LOS (days): 0  Geometric Mean LOS (GMLOS) (days): 2 30  Days to GMLOS:1 8     OBJECTIVE:    Risk of Unplanned Readmission Score: 7 52         Current admission status: Inpatient       Preferred Pharmacy:   [id number], [address]  [address]  Phone: [phone] Fax: [phone]    [facility] [address] [place] [address] [place] [id number] [place] [address]  Phone: [phone] Fax: [facility] [place] [address]  [address]  [facility] [address]  Phone: [phone] Fax: [phone]    Primary Care Provider: Wes Mccormick    Primary Insurance: BLUE CROSS  Secondary Insurance:     ASSESSMENT:  [address] [facility], [address] [facility] Representative - Spouse   Primary Phone: [phone] (Mobile)  Home Phone: [phone]                         Readmission Root Cause  30 Day Readmission: No    Patient Information  Admitted from[de-identified] Home  Mental Status: Alert  During Assessment patient was accompanied by: Not accompanied during assessment  Assessment information provided by[de-identified] Spouse (Pt requested CM call her spouse for opening assessment )  Primary Caregiver: Self  Support Systems: Spouse/significant other  [place] of Residence: [address] [facility] do you live in?: [facility], [address] entry access options   Select all that apply : Stairs  Number of steps to enter home : 3  Type of Current Residence: 2 story home  Upon entering residence, is there a bedroom on the main floor (no further steps)?: No  A bedroom is located on the following floor levels of residence (select all that apply):: 2nd Floor  Upon entering residence, is there a bathroom on the main floor (no further steps)?: Yes  Number of steps to 2nd floor from main floor: One Flight  In the last 12 months, was there a time when you were not able to pay the mortgage or rent on time?: No  In the last 12 months, how many places have you lived?: 1  In the last 12 months, was there a time when you did not have a steady place to sleep or slept in a shelter (including now)?: No  Homeless/housing insecurity resource given?: N/A  Living Arrangements: Lives w/ Spouse/significant other    Activities of Daily Living Prior to Admission  Functional Status: Independent  Completes ADLs independently?: Yes  Ambulates independently?: Yes  Does patient use assisted devices?: No  Does patient currently own DME?: No  Does patient have a history of Outpatient Therapy (PT/OT)?: No  Does the patient have a history of Short-Term Rehab?: No  Does patient have a history of HHC?: No  Does patient currently have [facility] [address]?: No         Patient Information Continued  Income Source: Employed  Within the past 12 months, you worried that your food would run out before you got the money to buy more : Never true  Within the past 12 months, the food you bought just didn't last and you didn't have money to get more : Never true  Food insecurity resource given?: N/A  Does patient receive dialysis treatments?: No  Does patient have a history of Mental Health Diagnosis?: No         Means of Transportation  Means of Transport to Appts[de-identified] Drives Self  In the past 12 months, has lack of transportation kept you from medical appointments or from getting medications?: No  In the past 12 months, has lack of transportation kept you from meetings, work, or from getting things needed for daily living?: No  Was application for public transport provided?: N/A        DISCHARGE DETAILS:    Discharge planning discussed with[de-identified] Pt's spouse  Freedom of Choice: Yes     CM contacted family/caregiver?: Yes             Contacts  Patient Contacts: Amberly Causey  Relationship to Patient[de-identified] Family  Contact Method: Phone  Phone Number: [phone]  Reason/Outcome: Discharge Planning

## 2023-02-25 ENCOUNTER — APPOINTMENT (INPATIENT)
Dept: RADIOLOGY | Facility: HOSPITAL | Age: 48
End: 2023-02-25

## 2023-02-25 LAB
ANION GAP SERPL CALCULATED.3IONS-SCNC: 12 MMOL/L (ref 4–13)
BUN SERPL-MCNC: 14 MG/DL (ref 5–25)
CALCIUM SERPL-MCNC: 8.2 MG/DL (ref 8.3–10.1)
CHLORIDE SERPL-SCNC: 101 MMOL/L (ref 96–108)
CO2 SERPL-SCNC: 26 MMOL/L (ref 21–32)
CREAT SERPL-MCNC: 0.69 MG/DL (ref 0.6–1.3)
ERYTHROCYTE [DISTWIDTH] IN BLOOD BY AUTOMATED COUNT: 12 % (ref 11.6–15.1)
GFR SERPL CREATININE-BSD FRML MDRD: 101 ML/MIN/1.73SQ M
GLUCOSE SERPL-MCNC: 70 MG/DL (ref 65–140)
HCT VFR BLD AUTO: 44.5 % (ref 34.8–46.1)
HGB BLD-MCNC: 14.4 G/DL (ref 11.5–15.4)
MAGNESIUM SERPL-MCNC: 2.4 MG/DL (ref 1.6–2.6)
MCH RBC QN AUTO: 31.3 PG (ref 26.8–34.3)
MCHC RBC AUTO-ENTMCNC: 32.4 G/DL (ref 31.4–37.4)
MCV RBC AUTO: 97 FL (ref 82–98)
PLATELET # BLD AUTO: 309 THOUSANDS/UL (ref 149–390)
PMV BLD AUTO: 9.8 FL (ref 8.9–12.7)
POTASSIUM SERPL-SCNC: 3.5 MMOL/L (ref 3.5–5.3)
RBC # BLD AUTO: 4.6 MILLION/UL (ref 3.81–5.12)
SODIUM SERPL-SCNC: 139 MMOL/L (ref 135–147)
WBC # BLD AUTO: 12.3 THOUSAND/UL (ref 4.31–10.16)

## 2023-02-25 RX ADMIN — SODIUM CHLORIDE, SODIUM GLUCONATE, SODIUM ACETATE, POTASSIUM CHLORIDE AND MAGNESIUM CHLORIDE 100 ML/HR: 526; 502; 368; 37; 30 INJECTION, SOLUTION INTRAVENOUS at 02:46

## 2023-02-25 RX ADMIN — DIATRIZOATE MEGLUMINE AND DIATRIZOATE SODIUM 120 ML: 660; 100 LIQUID ORAL; RECTAL at 09:30

## 2023-02-25 NOTE — PROGRESS NOTES
Progress Note - Lacey Damon 46 y o  female MRN: [id number]    Unit/Bed#: [facility] 817-01 Encounter: [id number]      Assessment: Lacey Damon is a 46 y o  female with likely adhesive SBO     NGT 1650 cc bilious  +Flatus    Plan:  Gastrograffin challenge today  NPO NGT  Pain control PRN  Strict I/Os  OOB ambulate      Subjective:   Patient seen and examined bedside  Endorses flatus since 2am this morning  No BM  OOB and ambulating  Objective:     Vitals: Blood pressure 167/91, pulse 81, temperature 98 6 °F (37 °C), resp  rate 20, height 5' 3" (1 6 m), weight 58 1 kg (128 lb), last menstrual period 10/04/2020, SpO2 96 %, not currently breastfeeding  ,Body mass index is 22 67 kg/m²  Intake/Output Summary (Last 24 hours) at 2/25/2023 0819  Last data filed at 2/25/2023 0245  Gross per 24 hour   Intake 1000 ml   Output 1250 ml   Net -250 ml       Physical Exam:   General - no acute distress, responsive and cooperative  CV - warm, regular rate  Pulm - normal work of breathing, no respiratory distress  Abd - soft, nondistended, nontender, NG bilious output  Neuro - m/s grossly intact, cn grossly intact  Ext - moving all extremities       Invasive Devices     Peripheral Intravenous Line  Duration           Peripheral IV 02/23/23 Left Antecubital 1 day          Drain  Duration           NG/OG/Enteral Tube Nasogastric 18 Fr Right nare 1 day                Lab, Imaging and other studies: I have personally reviewed pertinent reports      VTE Pharmacologic Prophylaxis: Enoxaparin (Lovenox)  VTE Mechanical Prophylaxis: sequential compression device

## 2023-02-25 NOTE — PLAN OF CARE
Problem: Prexisting or High Potential for Compromised Skin Integrity  Goal: Skin integrity is maintained or improved  Description: INTERVENTIONS:  - Identify patients at risk for skin breakdown  - Assess and monitor skin integrity  - Assess and monitor nutrition and hydration status  - Monitor labs   - Assess for incontinence   - Turn and reposition patient  - Assist with mobility/ambulation  - Relieve pressure over bony prominences  - Avoid friction and shearing  - Provide appropriate hygiene as needed including keeping skin clean and dry  - Evaluate need for skin moisturizer/barrier cream  - Collaborate with interdisciplinary team   - Patient/family teaching  - Consider wound care consult   Outcome: Progressing     Problem: PAIN - ADULT  Goal: Verbalizes/displays adequate comfort level or baseline comfort level  Description: Interventions:  - Encourage patient to monitor pain and request assistance  - Assess pain using appropriate pain scale  - Administer analgesics based on type and severity of pain and evaluate response  - Implement non-pharmacological measures as appropriate and evaluate response  - Consider cultural and social influences on pain and pain management  - Notify physician/advanced practitioner if interventions unsuccessful or patient reports new pain  Outcome: Progressing     Problem: INFECTION - ADULT  Goal: Absence or prevention of progression during hospitalization  Description: INTERVENTIONS:  - Assess and monitor for signs and symptoms of infection  - Monitor lab/diagnostic results  - Monitor all insertion sites, i e  indwelling lines, tubes, and drains  - Monitor endotracheal if appropriate and nasal secretions for changes in amount and color  - [place] appropriate cooling/warming therapies per order  - Administer medications as ordered  - Instruct and encourage patient and family to use good hand hygiene technique  - Identify and instruct in appropriate isolation precautions for identified infection/condition  Outcome: Progressing     Problem: DISCHARGE PLANNING  Goal: Discharge to home or other facility with appropriate resources  Description: INTERVENTIONS:  - Identify barriers to discharge w/patient and caregiver  - Arrange for needed discharge resources and transportation as appropriate  - Identify discharge learning needs (meds, wound care, etc )  - Arrange for interpretive services to assist at discharge as needed  - Refer to Case Management Department for coordinating discharge planning if the patient needs post-hospital services based on physician/advanced practitioner order or complex needs related to functional status, cognitive ability, or social support system  Outcome: Progressing     Problem: Knowledge Deficit  Goal: Patient/family/caregiver demonstrates understanding of disease process, treatment plan, medications, and discharge instructions  Description: Complete learning assessment and assess knowledge base  Interventions:  - Provide teaching at level of understanding  - Provide teaching via preferred learning methods  Outcome: Progressing     Problem: Nutrition/Hydration-ADULT  Goal: Nutrient/Hydration intake appropriate for improving, restoring or maintaining nutritional needs  Description: Monitor and assess patient's nutrition/hydration status for malnutrition  Collaborate with interdisciplinary team and initiate plan and interventions as ordered  Monitor patient's weight and dietary intake as ordered or per policy  Utilize nutrition screening tool and intervene as necessary  Determine patient's food preferences and provide high-protein, high-caloric foods as appropriate       INTERVENTIONS:  - Monitor oral intake, urinary output, labs, and treatment plans  - Assess nutrition and hydration status and recommend course of action  - Evaluate amount of meals eaten  - Assist patient with eating if necessary   - Allow adequate time for meals  - Recommend/ encourage appropriate diets, oral nutritional supplements, and vitamin/mineral supplements  - Order, calculate, and assess calorie counts as needed  - Recommend, monitor, and adjust tube feedings and TPN/PPN based on assessed needs  - Assess need for intravenous fluids  - Provide specific nutrition/hydration education as appropriate  - Include patient/family/caregiver in decisions related to nutrition  Outcome: Progressing     Problem: GASTROINTESTINAL - ADULT  Goal: Minimal or absence of nausea and/or vomiting  Description: INTERVENTIONS:  - Administer IV fluids if ordered to ensure adequate hydration  - Maintain NPO status until nausea and vomiting are resolved  - Nasogastric tube if ordered  - Administer ordered antiemetic medications as needed  - Provide nonpharmacologic comfort measures as appropriate  - Advance diet as tolerated, if ordered  - Consider nutrition services referral to assist patient with adequate nutrition and appropriate food choices  Outcome: Progressing  Goal: Maintains adequate nutritional intake  Description: INTERVENTIONS:  - Monitor percentage of each meal consumed  - Identify factors contributing to decreased intake, treat as appropriate  - Assist with meals as needed  - Monitor I&O, weight, and lab values if indicated  - Obtain nutrition services referral as needed  Outcome: Progressing     Problem: METABOLIC, FLUID AND ELECTROLYTES - ADULT  Goal: Electrolytes maintained within normal limits  Description: INTERVENTIONS:  - Monitor labs and assess patient for signs and symptoms of electrolyte imbalances  - Administer electrolyte replacement as ordered  - Monitor response to electrolyte replacements, including repeat lab results as appropriate  - Instruct patient on fluid and nutrition as appropriate  Outcome: Progressing

## 2023-02-25 NOTE — UTILIZATION REVIEW
Initial Clinical Review    Admission: Date/Time/Statement:   Admission Orders (From admission, onward)     Ordered        02/24/23 0322  Inpatient Admission  Once                      Orders Placed This Encounter   Procedures   • Inpatient Admission     Standing Status:   Standing     Number of Occurrences:   1     Order Specific Question:   Level of Care     Answer:   Med Surg [16]     Order Specific Question:   Estimated length of stay     Answer:   More than 2 Midnights     Order Specific Question:   Certification     Answer:   I certify that inpatient services are medically necessary for this patient for a duration of greater than two midnights  See H&P and MD Progress Notes for additional information about the patient's course of treatment  ED Arrival Information     Expected   -    Arrival   2/23/2023 21:27    Acuity   Urgent            Means of arrival   Walk-In    Escorted by   Spouse    Service   Surgery-General    Admission type   Emergency            Arrival complaint   ABD pain swollen            Chief Complaint   Patient presents with   • Abdominal Pain     Pt was complaining of onset stomach pain/cramping feeling since Tuesday morning, radiates to her lower back, no appetite, nauseous, and pt is unable to pass gas       Initial Presentation: 46 y o  female  Presents with abdominal pain and constipation  She reports RLQ pain started on 2/21 with associated N/V  She reports she had an urge to defecate but was unable, last BM was Tuesday am   Since then she had persistent nausea, increasing abd pain and distention  She has a past surgical hx of laparoscopic total hysterectomy 2/21 and laparoscopic cholecystectomy 1992  Medical hx  Of GERD  She takes no medications on a regular basis  She received 1 L Isolyte bolus and toradol iv x 1  CT abd shopwed acute high grade small bowel obstruction with a transition point in the RLQ  Moderate volume abdominoplevic ascites   She is admitted inpatient due to her SBO      Date: 2/25/23     Day 2: She endorses flatus since 2 am , no BM, OOB and ambulating  NGT to LIWS  Gastrograffin challenge today, Pain control prn, strict I/O's,     ED Triage Vitals   Temperature Pulse Respirations Blood Pressure SpO2   02/23/23 2140 02/23/23 2140 02/23/23 2140 02/23/23 2140 02/23/23 2140   97 7 °F (36 5 °C) (!) 109 17 125/82 95 %      Temp Source Heart Rate Source Patient Position - Orthostatic VS BP Location FiO2 (%)   02/23/23 2140 02/23/23 2140 02/24/23 0112 02/24/23 0112 --   Oral Monitor Lying Right arm       Pain Score       02/23/23 2140       8          Wt Readings from Last 1 Encounters:   02/23/23 58 1 kg (128 lb)     Additional Vital Signs:     Date/Time Temp Pulse Resp BP MAP (mmHg) SpO2 O2 Device Patient Position - Orthostatic VS   02/25/23 14:21:17 97 9 °F (36 6 °C) 70 18 160/90 113 98 % -- --   02/25/23 06:50:41 98 6 °F (37 °C) 81 -- 167/91 116 96 % -- --   02/25/23 06:50:29 98 6 °F (37 °C) 73 -- 167/91 116 96 % -- --   02/24/23 21:50:25 98 1 °F (36 7 °C) 86 20 131/95 107 96 % -- --   02/24/23 19:17:38 98 1 °F (36 7 °C) 71 18 126/87 100 95 % -- --   02/24/23 15:49:57 97 8 °F (36 6 °C) 93 20 138/73 -- 96 % -- --   02/24/23 0715 -- 90 18 129/84 99 95 % -- --   02/24/23 0600 -- 72 18 143/83 106 96 % -- --   02/24/23 0400 -- 74 18 135/86 105 96 % -- --   02/24/23 0315 -- 94 20 115/70 87 95 % -- --   02/24/23 0200 -- 92 18 115/70 85 99 % -- --   02/24/23 0115 -- 74 20 104/62 78 99 % None (Room air) --   02/24/23 0112 -- 77 18 104/62 -- 99 % None (Room air) Lying       Pertinent Labs/Diagnostic Test Results:   XR abdomen 1 view kub   Final Result by Jossy Junior MD (02/24 1113)      Endotracheal and enteric tubes in adequate position  Workstation performed: [id number]         CT abdomen pelvis with contrast   Final Result by Alberto Hunter MD (02/24 0155)      Acute high-grade small bowel obstruction with transition point in the right lower quadrant (601:53)    No free intraperitoneal air  Surgical consult advised  Moderate volume abdominopelvic ascites  Linear metallic density within the inferior right hepatic lobe possibly reflecting? Migrated surgical clip  Above findings discussed with Dr Melida Hardy at 1:52 AM on 2/24/2023        Workstation performed: [id number]         FL small bowel    (Results Pending) 2/25          Results from last 7 days   Lab Units 02/25/23  0603 02/24/23  0747 02/23/23  2349   WBC Thousand/uL 12 30* 11 15* 14 57*   HEMOGLOBIN g/dL 14 4 15 2 17 8*   HEMATOCRIT % 44 5 44 6 51 6*   PLATELETS Thousands/uL 309 208 404*   NEUTROS ABS Thousands/µL  --  7 49 10 31*         Results from last 7 days   Lab Units 02/25/23  0603 02/23/23  2349   SODIUM mmol/L 139 134*   POTASSIUM mmol/L 3 5 4 1   CHLORIDE mmol/L 101 98   CO2 mmol/L 26 27   ANION GAP mmol/L 12 9   BUN mg/dL 14 14   CREATININE mg/dL 0 69 0 80   EGFR ml/min/1 73sq m 101 86   CALCIUM mg/dL 8 2* 9 8   MAGNESIUM mg/dL 2 4  --      Results from last 7 days   Lab Units 02/23/23  2349   AST U/L 11   ALT U/L 16   ALK PHOS U/L 58   TOTAL PROTEIN g/dL 7 0   ALBUMIN g/dL 3 7   TOTAL BILIRUBIN mg/dL 0 75         Results from last 7 days   Lab Units 02/25/23  0603 02/23/23  2349   GLUCOSE RANDOM mg/dL 70 127       Results from last 7 days   Lab Units 02/24/23  0324   LACTIC ACID mmol/L 1 2       Results from last 7 days   Lab Units 02/23/23  2349   LIPASE u/L 98       Results from last 7 days   Lab Units 02/24/23  0052   CLARITY UA  Clear   COLOR UA  Light Yellow   SPEC GRAV UA  1 018   PH UA  5 5   GLUCOSE UA mg/dl Negative   KETONES UA mg/dl 20 (1+)*   BLOOD UA  Negative   PROTEIN UA mg/dl Negative   NITRITE UA  Negative   BILIRUBIN UA  Negative   UROBILINOGEN UA (BE) mg/dl <2 0   LEUKOCYTES UA  Negative       ED Treatment:   Medication Administration from 02/23/2023 2127 to 02/24/2023 1119       Date/Time Order Dose Route Action Comments     02/23/2023 2345 EST multi-electrolyte (ISOLYTE-S PH 7 4) bolus 1,000 mL 1,000 mL Intravenous New Bag --     02/23/2023 2345 EST ondansetron (ZOFRAN) injection 4 mg 4 mg Intravenous Given --     02/23/2023 2345 EST ketorolac (TORADOL) injection 15 mg 15 mg Intravenous Given --     02/24/2023 0054 EST iohexol (OMNIPAQUE) 350 MG/ML injection (SINGLE-DOSE) 100 mL 100 mL Intravenous Given --     02/24/2023 1039 EST phenol (CHLORASEPTIC) 1 4 % mucosal liquid 1 spray 1 spray Mouth/Throat Given --     02/24/2023 0813 EST phenol (CHLORASEPTIC) 1 4 % mucosal liquid 1 spray 1 spray Mouth/Throat Given --     02/24/2023 0555 EST phenol (CHLORASEPTIC) 1 4 % mucosal liquid 1 spray 1 spray Mouth/Throat Given --     02/24/2023 0510 EST multi-electrolyte (PLASMALYTE-A/ISOLYTE-S PH 7 4) IV solution 100 mL/hr Intravenous New Bag --     02/24/2023 0324 EST multi-electrolyte (ISOLYTE-S PH 7 4) bolus 1,000 mL 1,000 mL Intravenous New Bag --        Past Medical History:   Diagnosis Date   • Gastritis     Age 19   • GERD (gastroesophageal reflux disease)    • Heart murmur      Present on Admission:  • SBO (small bowel obstruction) ([facility])      Admitting Diagnosis: SBO (small bowel obstruction) ([facility]) [K56 609]  Abdominal pain [R10 9]  Age/Sex: 46 y o  female       Admission Orders:  Scheduled Medications:  enoxaparin, 40 mg, Subcutaneous, Daily      Continuous IV Infusions:  multi-electrolyte, 100 mL/hr, Intravenous, Continuous      PRN Meds:  ondansetron, 4 mg, Intravenous, Q6H PRN  phenol, 1 spray, Mouth/Throat, Q2H PRN      Nursing Orders - VS - ambulate tid - up as tolerated - I/O q shift - SCD's to le's - diet NPO - NG tube to [employer] Utilization Review Department  ATTENTION: Please call with any questions or concerns to [phone] and carefully listen to the prompts so that you are directed to the right person   All voicemails are confidential   Elin Buerger all requests for admission clinical reviews, approved or denied determinations and any other requests to dedicated fax number below belonging to the campus where the patient is receiving treatment   List of dedicated fax numbers for the Facilities:  [address] [place] [address] DENIALS (Administrative/Medical Necessity) [phone]   [address]  (Maternity/NICU/Pediatrics) [phone]   [address] [phone]   Mendy Lino [address] [phone]   [address] [address] [facility] [phone]   [facility] 134 5 [facility] [phone]

## 2023-02-26 VITALS
RESPIRATION RATE: 18 BRPM | TEMPERATURE: 97.9 F | DIASTOLIC BLOOD PRESSURE: 65 MMHG | BODY MASS INDEX: 22.68 KG/M2 | SYSTOLIC BLOOD PRESSURE: 120 MMHG | HEIGHT: 63 IN | WEIGHT: 128 LBS | OXYGEN SATURATION: 97 % | HEART RATE: 73 BPM

## 2023-02-26 RX ADMIN — SODIUM CHLORIDE, SODIUM GLUCONATE, SODIUM ACETATE, POTASSIUM CHLORIDE AND MAGNESIUM CHLORIDE 100 ML/HR: 526; 502; 368; 37; 30 INJECTION, SOLUTION INTRAVENOUS at 01:59

## 2023-02-26 NOTE — PLAN OF CARE
Problem: PAIN - ADULT  Goal: Verbalizes/displays adequate comfort level or baseline comfort level  Description: Interventions:  - Encourage patient to monitor pain and request assistance  - Assess pain using appropriate pain scale  - Administer analgesics based on type and severity of pain and evaluate response  - Implement non-pharmacological measures as appropriate and evaluate response  - Consider cultural and social influences on pain and pain management  - Notify physician/advanced practitioner if interventions unsuccessful or patient reports new pain  Outcome: Progressing     Problem: INFECTION - ADULT  Goal: Absence or prevention of progression during hospitalization  Description: INTERVENTIONS:  - Assess and monitor for signs and symptoms of infection  - Monitor lab/diagnostic results  - Monitor all insertion sites, i e  indwelling lines, tubes, and drains  - Monitor endotracheal if appropriate and nasal secretions for changes in amount and color  - [place] appropriate cooling/warming therapies per order  - Administer medications as ordered  - Instruct and encourage patient and family to use good hand hygiene technique  - Identify and instruct in appropriate isolation precautions for identified infection/condition  Outcome: Progressing     Problem: Nutrition/Hydration-ADULT  Goal: Nutrient/Hydration intake appropriate for improving, restoring or maintaining nutritional needs  Description: Monitor and assess patient's nutrition/hydration status for malnutrition  Collaborate with interdisciplinary team and initiate plan and interventions as ordered  Monitor patient's weight and dietary intake as ordered or per policy  Utilize nutrition screening tool and intervene as necessary  Determine patient's food preferences and provide high-protein, high-caloric foods as appropriate       INTERVENTIONS:  - Monitor oral intake, urinary output, labs, and treatment plans  - Assess nutrition and hydration status and recommend course of action  - Evaluate amount of meals eaten  - Assist patient with eating if necessary   - Allow adequate time for meals  - Recommend/ encourage appropriate diets, oral nutritional supplements, and vitamin/mineral supplements  - Order, calculate, and assess calorie counts as needed  - Recommend, monitor, and adjust tube feedings and TPN/PPN based on assessed needs  - Assess need for intravenous fluids  - Provide specific nutrition/hydration education as appropriate  - Include patient/family/caregiver in decisions related to nutrition  Outcome: Progressing     Problem: METABOLIC, FLUID AND ELECTROLYTES - ADULT  Goal: Electrolytes maintained within normal limits  Description: INTERVENTIONS:  - Monitor labs and assess patient for signs and symptoms of electrolyte imbalances  - Administer electrolyte replacement as ordered  - Monitor response to electrolyte replacements, including repeat lab results as appropriate  - Instruct patient on fluid and nutrition as appropriate  Outcome: Progressing

## 2023-02-26 NOTE — PLAN OF CARE
Problem: Prexisting or High Potential for Compromised Skin Integrity  Goal: Skin integrity is maintained or improved  Description: INTERVENTIONS:  - Identify patients at risk for skin breakdown  - Assess and monitor skin integrity  - Assess and monitor nutrition and hydration status  - Monitor labs   - Assess for incontinence   - Turn and reposition patient  - Assist with mobility/ambulation  - Relieve pressure over bony prominences  - Avoid friction and shearing  - Provide appropriate hygiene as needed including keeping skin clean and dry  - Evaluate need for skin moisturizer/barrier cream  - Collaborate with interdisciplinary team   - Patient/family teaching  - Consider wound care consult   2/26/2023 1833 by Melonie Eden RN  Outcome: Completed  2/26/2023 0713 by Melonie Eden RN  Outcome: Progressing     Problem: PAIN - ADULT  Goal: Verbalizes/displays adequate comfort level or baseline comfort level  Description: Interventions:  - Encourage patient to monitor pain and request assistance  - Assess pain using appropriate pain scale  - Administer analgesics based on type and severity of pain and evaluate response  - Implement non-pharmacological measures as appropriate and evaluate response  - Consider cultural and social influences on pain and pain management  - Notify physician/advanced practitioner if interventions unsuccessful or patient reports new pain  2/26/2023 1833 by Melonie Eden RN  Outcome: Completed  2/26/2023 0713 by Melonie Eden RN  Outcome: Progressing     Problem: INFECTION - ADULT  Goal: Absence or prevention of progression during hospitalization  Description: INTERVENTIONS:  - Assess and monitor for signs and symptoms of infection  - Monitor lab/diagnostic results  - Monitor all insertion sites, i e  indwelling lines, tubes, and drains  - Monitor endotracheal if appropriate and nasal secretions for changes in amount and color  - [place] appropriate cooling/warming therapies per order  - Administer medications as ordered  - Instruct and encourage patient and family to use good hand hygiene technique  - Identify and instruct in appropriate isolation precautions for identified infection/condition  2/26/2023 1833 by Tone Steel RN  Outcome: Completed  2/26/2023 0713 by Tone Steel RN  Outcome: Progressing     Problem: DISCHARGE PLANNING  Goal: Discharge to home or other facility with appropriate resources  Description: INTERVENTIONS:  - Identify barriers to discharge w/patient and caregiver  - Arrange for needed discharge resources and transportation as appropriate  - Identify discharge learning needs (meds, wound care, etc )  - Arrange for interpretive services to assist at discharge as needed  - Refer to Case Management Department for coordinating discharge planning if the patient needs post-hospital services based on physician/advanced practitioner order or complex needs related to functional status, cognitive ability, or social support system  2/26/2023 1833 by Tone Steel RN  Outcome: Completed  2/26/2023 0713 by Tone Steel RN  Outcome: Progressing     Problem: Knowledge Deficit  Goal: Patient/family/caregiver demonstrates understanding of disease process, treatment plan, medications, and discharge instructions  Description: Complete learning assessment and assess knowledge base  Interventions:  - Provide teaching at level of understanding  - Provide teaching via preferred learning methods  2/26/2023 1833 by Tone Steel RN  Outcome: Completed  2/26/2023 0713 by Tone Steel RN  Outcome: Progressing     Problem: Nutrition/Hydration-ADULT  Goal: Nutrient/Hydration intake appropriate for improving, restoring or maintaining nutritional needs  Description: Monitor and assess patient's nutrition/hydration status for malnutrition  Collaborate with interdisciplinary team and initiate plan and interventions as ordered    Monitor patient's weight and dietary intake as ordered or per policy  Utilize nutrition screening tool and intervene as necessary  Determine patient's food preferences and provide high-protein, high-caloric foods as appropriate       INTERVENTIONS:  - Monitor oral intake, urinary output, labs, and treatment plans  - Assess nutrition and hydration status and recommend course of action  - Evaluate amount of meals eaten  - Assist patient with eating if necessary   - Allow adequate time for meals  - Recommend/ encourage appropriate diets, oral nutritional supplements, and vitamin/mineral supplements  - Order, calculate, and assess calorie counts as needed  - Recommend, monitor, and adjust tube feedings and TPN/PPN based on assessed needs  - Assess need for intravenous fluids  - Provide specific nutrition/hydration education as appropriate  - Include patient/family/caregiver in decisions related to nutrition  2/26/2023 1833 by Ion Moffett RN  Outcome: Completed  2/26/2023 0713 by Ion Moffett RN  Outcome: Progressing     Problem: GASTROINTESTINAL - ADULT  Goal: Minimal or absence of nausea and/or vomiting  Description: INTERVENTIONS:  - Administer IV fluids if ordered to ensure adequate hydration  - Maintain NPO status until nausea and vomiting are resolved  - Nasogastric tube if ordered  - Administer ordered antiemetic medications as needed  - Provide nonpharmacologic comfort measures as appropriate  - Advance diet as tolerated, if ordered  - Consider nutrition services referral to assist patient with adequate nutrition and appropriate food choices  2/26/2023 1833 by Ion Moffett RN  Outcome: Completed  2/26/2023 0713 by Ion Moffett RN  Outcome: Progressing  Goal: Maintains adequate nutritional intake  Description: INTERVENTIONS:  - Monitor percentage of each meal consumed  - Identify factors contributing to decreased intake, treat as appropriate  - Assist with meals as needed  - Monitor I&O, weight, and lab values if indicated  - Obtain nutrition services referral as needed  2/26/2023 1833 by Sanjuana Campos RN  Outcome: Completed  2/26/2023 0713 by Sanjuana Campos RN  Outcome: Progressing     Problem: METABOLIC, FLUID AND ELECTROLYTES - ADULT  Goal: Electrolytes maintained within normal limits  Description: INTERVENTIONS:  - Monitor labs and assess patient for signs and symptoms of electrolyte imbalances  - Administer electrolyte replacement as ordered  - Monitor response to electrolyte replacements, including repeat lab results as appropriate  - Instruct patient on fluid and nutrition as appropriate  2/26/2023 1833 by Sanjuana Campos RN  Outcome: Completed  2/26/2023 0713 by Sanjuana Campos RN  Outcome: Progressing

## 2023-02-26 NOTE — DISCHARGE SUMMARY
Discharge Summary    Conner Alanis 46 y o  female MRN: [id number]    Unit/Bed#: [facility] 738-29 Encounter: [id number]    Admission Date: 2/23/2023     Discharge Date:/2/26/2023    Attending: Radha John DO  Operative Surgeon: n/a    Consultants: none    Admitting Diagnosis: SBO (small bowel obstruction) ([address] ) [K56 609]  Abdominal pain [R10 9]    Principle Diagnosis: same as above    Secondary Diagnosis:  Past Medical History:   Diagnosis Date   • Gastritis     Age 19   • GERD (gastroesophageal reflux disease)    • Heart murmur      Past Surgical History:   Procedure Laterality Date   • EGD     • HYSTERECTOMY     • LAPAROSCOPIC CHOLECYSTECTOMY  1992   • OOPHORECTOMY     • VA LAPS TOTAL HYSTERECT 250 GM/< W/RMVL TUBE/OVARY N/A 2/4/2021    Procedure: ROBOTIC TOTAL LAPAROSCOPIC HYSTERECTOMY, BILATERAL SALPINGO-OOPHORECTOMY;  Surgeon: Patricia Alanis MD;  Location: [facility];  Service: Gynecology Oncology   • WISDOM TOOTH EXTRACTION  1991        Procedures Performed:     Imaging:Procedure: XR abdomen 1 view kub    Result Date: 2/24/2023  Narrative: ABDOMEN INDICATION:   NGT placement  COMPARISON:  CT of the abdomen and pelvis from earlier the same day  VIEWS:  AP supine Images: 2 FINDINGS: The distal tip of an endotracheal tube is seen, approximately 2 4 cm above the mis  An enteric tube projects over the region of the stomach  Only the upper abdomen imaged  Several dilated loops of bowel are seen  No definite evidence of pneumoperitoneum  No pathologic calcifications in the visualized abdomen  Excreted IV contrast is seen in the bilateral collecting systems  Visualized osseous structures are unremarkable for the patient's age  Surgical clips are seen in the right upper quadrant  Impression: Endotracheal and enteric tubes in adequate position  Workstation performed: [id number]     Procedure: FL small bowel    Result Date: 2/25/2023  Narrative: SMALL BOWEL FOLLOW THROUGH INDICATION:   Rule out SBO    COMPARISON: CT 2/24/2023 IMAGES:  2 TECHNIQUE:    view of the abdomen was obtained  Oral contrast was then administered to the patient and followed through the small bowel to the colon utilizing overhead radiographs at periodic intervals  Spot imaging of the terminal ileum was also performed  FINDINGS: Contrast is identified within dilated loops of small bowel as well as throughout the colon  Nasogastric tube tip overlies the stomach  Impression: Contrast present throughout the large bowel  Distended loops of small bowel again seen  Findings in keeping with incomplete small bowel obstruction  Workstation performed: [id number]     Procedure: CT abdomen pelvis with contrast    Result Date: 2/24/2023  Narrative: CT ABDOMEN AND PELVIS WITH IV CONTRAST INDICATION:   RLQ abdominal pain (Age >= 14y) RLQ pain  COMPARISON:  None  TECHNIQUE:  CT examination of the abdomen and pelvis was performed  Axial, sagittal, and coronal 2D reformatted images were created from the source data and submitted for interpretation  Radiation dose length product (DLP) for this visit:  319 18 mGy-cm   This examination, like all CT scans performed in the [facility], was performed utilizing techniques to minimize radiation dose exposure, including the use of iterative  reconstruction and automated exposure control  IV Contrast:  100 mL of iohexol (OMNIPAQUE) Enteric Contrast:  Enteric contrast was not administered  FINDINGS: ABDOMEN LOWER CHEST:  No clinically significant abnormality identified in the visualized lower chest  LIVER/BILIARY TREE:  There is a small linear metallic density noted in the inferior right hepatic lobe,? Migrated surgical clip  The liver is otherwise unremarkable  GALLBLADDER:  Gallbladder is surgically absent  SPLEEN:  Unremarkable  PANCREAS:  Unremarkable  ADRENAL GLANDS:  Unremarkable  KIDNEYS/URETERS:  Unremarkable  No hydronephrosis   STOMACH AND BOWEL:  Multiple abnormally dilated proximal and mid small bowel loops are seen with mural hyperemia  There are collapsed terminal ileal/distal small bowel loops noted in the pelvis  There is focal transition point seen in the right lower quadrant (2:118, 601:53)  APPENDIX:  A normal appendix was visualized  ABDOMINOPELVIC CAVITY:  There is moderate volume abdominopelvic ascites  No pneumoperitoneum  No lymphadenopathy  VESSELS:  Unremarkable for patient's age  PELVIS REPRODUCTIVE ORGANS:  Surgical changes of prior hysterectomy  URINARY BLADDER:  Unremarkable  ABDOMINAL WALL/INGUINAL REGIONS:  Unremarkable  OSSEOUS STRUCTURES:  No acute fracture or destructive osseous lesion  Impression: Acute high-grade small bowel obstruction with transition point in the right lower quadrant (601:53)  No free intraperitoneal air  Surgical consult advised  Moderate volume abdominopelvic ascites  Linear metallic density within the inferior right hepatic lobe possibly reflecting? Migrated surgical clip  Above findings discussed with Dr Marino Leung at 1:52 AM on 2/24/2023  Workstation performed: [id number]       Discharge Medications:  See after visit summary for reconciled discharge medications provided to patient and family  Brief HPI (per H/P Erika Sotelo MD):   HPI:  Camila Muñoz is a 46 y o  female who presents with abdominal pain and constipation  She noticed new onset abdominal pain in the right lower quadrant on Tuesday 2/21 with associated nausea and vomiting  Throughout the day she felt the urge to defecate, but was unable to  Since then she has had persistent nausea, increasing abdominal pain and distention  Her last bowel movement was Tuesday morning  She has only intermittently been passing gas, and has not had a bowel movement since then  Subjective fevers and chills  No history of similar symptoms, no history of bowel obstructions  Nothing significant to eat or drink since Tuesday    Past surgical history significant for a laparoscopic total hysterectomy (02/2021) and laparoscopic cholecystectomy (1992)  Medical history only significant for GERD  She takes no medications on a regular basis  Prior to evaluation in the ED she received a 1 L isolyte bolus and 1x dose of IV toradol  Hospital Course: Lacey Damon is a 46 y o  female who presented 2/23/2023 per [facility] and was admitted, an NG tube was placed and was started on IV fluids  After patient was allowed to decompress for about 24 hours  Gastrografin challenge was tainted which showed contrast into the large intestine and patient had return of bowel function  NG tube was removed and patient was started on clears which she tolerated  Patient was then given a regular diet today, which she tolerated as well  She was then deemed appropriate for discharge  Patient was discharged on HD 2  On the day of discharge, the patient was voiding spontaneously, ambulating at baseline, and pain was well controlled  The patient was sent home on home medication  She understood all instructions for discharge  She was also given the names and numbers of the providers as well as instructions for follow up appointments  Condition at Discharge: good     Provisions for Follow-Up Care:  See after visit summary for information related to follow-up care and any pertinent home health orders  Disposition: Home    Discharge instructions/Information to patient and family:   See after visit summary for information provided to patient and family  Planned Readmission: No    Discharge Statement   I spent 25 minutes discharging the patient  This time was spent on the day of discharge  I had direct contact with the patient on the day of discharge  Additional documentation is required if more than 30 minutes were spent on discharge

## 2023-02-26 NOTE — PROGRESS NOTES
Progress Note - General Surgery   Lindon Im 46 y o  female MRN: [id number]  Unit/Bed#: [facility] 596-08 Encounter: [id number]      Assessment:  50yo F admitted for management of a (now-resolved) partial SBO  Afebrile, vitals WNL, and saturating adequately on room air  Passing flatus and loose bowel movements    Plan:  - Gastrograffin challenge yesterday without evidence of obstruction  - NGT removed, clear liquids initiated and tolerated without issue  - Will give a full clear liquid breakfast tray and if tolerated progress to regular diet  - Possible discharge home this evening vs tomorrow  - Analgesics/antiemetics PRN  - DVT prophylaxis  - OOB / ambulation as tolerated    Subjective/Objective     Subjective:   No acute events overnight  This AM the patient reported almost complete absence of abdominal pain w/ no fevers/chills, nausea/emesis, or dyspnea  OOB/ambulating  Passing flatus and a loose bowel movement last evening  Tolerating her clear liquids  Objective:     Blood pressure 131/82, pulse 72, temperature 98 1 °F (36 7 °C), resp  rate 18, height 5' 3" (1 6 m), weight 58 1 kg (128 lb), last menstrual period 10/04/2020, SpO2 98 %, not currently breastfeeding  ,Body mass index is 22 67 kg/m²  Gen: Awake, alert, and in no acute distress  Head: Normocephalic, atraumatic  Neck: No obvious JVD, trachea midline  Cardiovascular: Regular rate  Respiratory:  In no acute respiratory distress w/ no use of accessory muscles of respiration  Abd: Soft, slightly distended, and non-tender to palpation w/ no guarding/rigidity or signs of peritonitis  Extremities: No visible wounds/ulcerations to the B/L upper/lower extremities  Skin: Warm/dry  Psychiatric: Appropriate mood/affect    Intake/Output Summary (Last 24 hours) at 2/26/2023 0620  Last data filed at 2/26/2023 0157  Gross per 24 hour   Intake 2958 33 ml   Output 105 ml   Net 2853 33 ml       Invasive Devices     Peripheral Intravenous Line  Duration Peripheral IV 02/23/23 Left Antecubital 2 days                I have personally reviewed pertinent lab results    , CBC:   No results found for: WBC, HGB, HCT, MCV, PLT, ADJUSTEDWBC, MCH, MCHC, RDW, MPV, NRBC, CMP:   No results found for: SODIUM, K, CL, CO2, ANIONGAP, BUN, CREATININE, GLUCOSE, CALCIUM, AST, ALT, ALKPHOS, PROT, BILITOT, EGFR, Coagulation: No results found for: PT, INR, APTT, Urinalysis: No results found for: COLORU, CLARITYU, SPECGRAV, PHUR, LEUKOCYTESUR, NITRITE, PROTEINUA, GLUCOSEU, KETONESU, BILIRUBINUR, BLOODU, Amylase: No results found for: AMYLASE, Lipase: No results found for: LIPASE

## 2023-02-26 NOTE — PLAN OF CARE
Problem: Prexisting or High Potential for Compromised Skin Integrity  Goal: Skin integrity is maintained or improved  Description: INTERVENTIONS:  - Identify patients at risk for skin breakdown  - Assess and monitor skin integrity  - Assess and monitor nutrition and hydration status  - Monitor labs   - Assess for incontinence   - Turn and reposition patient  - Assist with mobility/ambulation  - Relieve pressure over bony prominences  - Avoid friction and shearing  - Provide appropriate hygiene as needed including keeping skin clean and dry  - Evaluate need for skin moisturizer/barrier cream  - Collaborate with interdisciplinary team   - Patient/family teaching  - Consider wound care consult   Outcome: Progressing     Problem: PAIN - ADULT  Goal: Verbalizes/displays adequate comfort level or baseline comfort level  Description: Interventions:  - Encourage patient to monitor pain and request assistance  - Assess pain using appropriate pain scale  - Administer analgesics based on type and severity of pain and evaluate response  - Implement non-pharmacological measures as appropriate and evaluate response  - Consider cultural and social influences on pain and pain management  - Notify physician/advanced practitioner if interventions unsuccessful or patient reports new pain  Outcome: Progressing     Problem: INFECTION - ADULT  Goal: Absence or prevention of progression during hospitalization  Description: INTERVENTIONS:  - Assess and monitor for signs and symptoms of infection  - Monitor lab/diagnostic results  - Monitor all insertion sites, i e  indwelling lines, tubes, and drains  - Monitor endotracheal if appropriate and nasal secretions for changes in amount and color  - [place] appropriate cooling/warming therapies per order  - Administer medications as ordered  - Instruct and encourage patient and family to use good hand hygiene technique  - Identify and instruct in appropriate isolation precautions for identified infection/condition  Outcome: Progressing     Problem: DISCHARGE PLANNING  Goal: Discharge to home or other facility with appropriate resources  Description: INTERVENTIONS:  - Identify barriers to discharge w/patient and caregiver  - Arrange for needed discharge resources and transportation as appropriate  - Identify discharge learning needs (meds, wound care, etc )  - Arrange for interpretive services to assist at discharge as needed  - Refer to Case Management Department for coordinating discharge planning if the patient needs post-hospital services based on physician/advanced practitioner order or complex needs related to functional status, cognitive ability, or social support system  Outcome: Progressing     Problem: Knowledge Deficit  Goal: Patient/family/caregiver demonstrates understanding of disease process, treatment plan, medications, and discharge instructions  Description: Complete learning assessment and assess knowledge base  Interventions:  - Provide teaching at level of understanding  - Provide teaching via preferred learning methods  Outcome: Progressing     Problem: Nutrition/Hydration-ADULT  Goal: Nutrient/Hydration intake appropriate for improving, restoring or maintaining nutritional needs  Description: Monitor and assess patient's nutrition/hydration status for malnutrition  Collaborate with interdisciplinary team and initiate plan and interventions as ordered  Monitor patient's weight and dietary intake as ordered or per policy  Utilize nutrition screening tool and intervene as necessary  Determine patient's food preferences and provide high-protein, high-caloric foods as appropriate       INTERVENTIONS:  - Monitor oral intake, urinary output, labs, and treatment plans  - Assess nutrition and hydration status and recommend course of action  - Evaluate amount of meals eaten  - Assist patient with eating if necessary   - Allow adequate time for meals  - Recommend/ encourage appropriate diets, oral nutritional supplements, and vitamin/mineral supplements  - Order, calculate, and assess calorie counts as needed  - Recommend, monitor, and adjust tube feedings and TPN/PPN based on assessed needs  - Assess need for intravenous fluids  - Provide specific nutrition/hydration education as appropriate  - Include patient/family/caregiver in decisions related to nutrition  Outcome: Progressing     Problem: GASTROINTESTINAL - ADULT  Goal: Minimal or absence of nausea and/or vomiting  Description: INTERVENTIONS:  - Administer IV fluids if ordered to ensure adequate hydration  - Maintain NPO status until nausea and vomiting are resolved  - Nasogastric tube if ordered  - Administer ordered antiemetic medications as needed  - Provide nonpharmacologic comfort measures as appropriate  - Advance diet as tolerated, if ordered  - Consider nutrition services referral to assist patient with adequate nutrition and appropriate food choices  Outcome: Progressing  Goal: Maintains adequate nutritional intake  Description: INTERVENTIONS:  - Monitor percentage of each meal consumed  - Identify factors contributing to decreased intake, treat as appropriate  - Assist with meals as needed  - Monitor I&O, weight, and lab values if indicated  - Obtain nutrition services referral as needed  Outcome: Progressing     Problem: METABOLIC, FLUID AND ELECTROLYTES - ADULT  Goal: Electrolytes maintained within normal limits  Description: INTERVENTIONS:  - Monitor labs and assess patient for signs and symptoms of electrolyte imbalances  - Administer electrolyte replacement as ordered  - Monitor response to electrolyte replacements, including repeat lab results as appropriate  - Instruct patient on fluid and nutrition as appropriate  Outcome: Progressing

## 2023-02-27 ENCOUNTER — TRANSITIONAL CARE MANAGEMENT (OUTPATIENT)
Dept: FAMILY MEDICINE CLINIC | Facility: CLINIC | Age: 48
End: 2023-02-27

## 2023-02-27 NOTE — UTILIZATION REVIEW
NOTIFICATION OF INPATIENT ADMISSION   AUTHORIZATION REQUEST   SERVICING FACILITY:   [facility]  Address: [address] [place] [address], [address] [place] [address]  Tax ID: [id number]  NPI: [id number] ATTENDING PROVIDER:  Attending Name and NPI#: Dagmar Dial [[id number]]  Address: [address] [place] [address]  Phone: [phone]   ADMISSION INFORMATION:  Place of Service: [address] [place] [address] Code: 21  Inpatient Admission Date/Time: 2/24/23  3:22 AM  Discharge Date/Time: 2/26/2023  6:34 PM  Admitting Diagnosis Code/Description:  SBO (small bowel obstruction) ([facility] ) [D05 277]  Abdominal pain [R10 9]     UTILIZATION REVIEW CONTACT:  Cindy Knight Utilization   Network Utilization Review Department  Phone: [phone]  Fax: [phone]  Email: [email or web address]  Contact for approvals/pending authorizations, clinical reviews, and discharge  PHYSICIAN ADVISORY SERVICES:  Medical Necessity Denial & Sfrc-pt-Xvht Review  Phone: [phone]  Fax: [phone]  Email: [email or web address]

## 2023-03-03 ENCOUNTER — TELEPHONE (OUTPATIENT)
Dept: FAMILY MEDICINE CLINIC | Facility: CLINIC | Age: 48
End: 2023-03-03

## 2023-03-03 NOTE — TELEPHONE ENCOUNTER
Called patient, recently hospitalized for bowel obstruction  She wants to know if she can take stool softener or try Miralax as she is moving bowels but does feel there is stool high in bowel  She will see Dr Chin Oquendo the end of March  Please review

## 2023-03-03 NOTE — TELEPHONE ENCOUNTER
Patient was released 2/26 from hospital and unable to come in days available for TCM    Patient has some questions and would like a call back from the nurse    Patient can be reached [phone]

## 2023-03-09 ENCOUNTER — TELEPHONE (OUTPATIENT)
Dept: NEUROLOGY | Facility: CLINIC | Age: 48
End: 2023-03-09

## 2023-03-09 NOTE — TELEPHONE ENCOUNTER
Left voicemail message for patient to call back to confirm appt with jennifer on [phone] [facility] office neurology

## 2023-03-10 ENCOUNTER — TELEPHONE (OUTPATIENT)
Dept: CARDIOLOGY CLINIC | Facility: CLINIC | Age: 48
End: 2023-03-10

## 2023-03-13 ENCOUNTER — CONSULT (OUTPATIENT)
Dept: NEUROLOGY | Facility: CLINIC | Age: 48
End: 2023-03-13
Payer: COMMERCIAL

## 2023-03-13 VITALS
HEIGHT: 63 IN | SYSTOLIC BLOOD PRESSURE: 120 MMHG | DIASTOLIC BLOOD PRESSURE: 80 MMHG | HEART RATE: 56 BPM | WEIGHT: 128 LBS | BODY MASS INDEX: 22.68 KG/M2

## 2023-03-13 DIAGNOSIS — R13.10 DYSPHAGIA: ICD-10-CM

## 2023-03-13 DIAGNOSIS — M54.81 OCCIPITAL NEURALGIA OF LEFT SIDE: Primary | ICD-10-CM

## 2023-03-13 PROCEDURE — 99204 OFFICE O/P NEW MOD 45 MIN: CPT | Performed by: STUDENT IN AN ORGANIZED HEALTH CARE EDUCATION/TRAINING PROGRAM

## 2023-03-13 NOTE — PATIENT INSTRUCTIONS
Patient Instructions:  -referral to ENT for evaluation of episodic difficulty with swallowing  -for occipital neuralgia - if symptoms become more prominent/intrusive, please contact the office. Will discuss referral to pain management vs starting a medication for pain relief  -follow up with neurology as clinically indicated

## 2023-03-13 NOTE — PROGRESS NOTES
"  [facility] Neurology Consult  PATIENT:  Allison Yeung  MRN:  [id number]  :  1975  DATE OF SERVICE:  3/13/2023  REFERRED BY: Kamryn Silva MD  PMD: Kennedi Mccormick    Assessment/Plan:     Allison Yeung is a very pleasant 48 y.o. female with no significant PMHx who presents for evaluation of neck pain and swallowing difficulties.     L occipital neuralgia  -pt endorses ongoing dull pain behind the L ear and endorses paroxysms of pain which radiate from the neck upward in the same distribution  -symptoms are currently not significantly bothersome. Discussed that if symptoms become more severe/frequent will refer for ONB vs start medical management    Episodic dysphagia  -referral for ENT evaluation    CC:   Neck pain, dysphagia    History of Present Illness:     48 y.o. female with no significant PMHx who presents for evaluation of neck pain and swallowing difficulties.     States that symptoms have been present for the past 6 years. She endorses ongoing dull pain that is present behind her L ear. This pain is persistent but she will sometimes have paroxysms of shooting pain radiating upward from her neck on the L side described as \"zings.\"    Intermittent swallowing difficulties have been present over the last 6 years as well. Her L sided pain is not associated with these episodes. She states that they will occur randomly and has not been able to identify a specific trigger. She describes her symptoms as a sudden choking sensation during which she cannot swallow. States that she feels like her throat is \"seizing up.\" States that these symptoms last for a few seconds at most. She denies any associated anxiety with these symptoms. States that it seems to occur randomly - it has happened while she is lying in bed, while driving, while sitting looking at her phone.    Denies history of head/neck surgery. Denies history of any other significant musculoskeletal complaints. Denies fatigueable " weakness. Denies any other neurologic complaints. history of surgery or musculoskeletal issues previously.     Past Medical History:     Past Medical History:   Diagnosis Date    Gastritis     Age 18    GERD (gastroesophageal reflux disease)     Heart murmur        Patient Active Problem List   Diagnosis    Acute non-recurrent frontal sinusitis    Acute bacterial conjunctivitis of both eyes    Exposure to SARS-associated coronavirus    Bilateral ovarian cysts    Surgical menopause    Palpitation    Heart murmur    Allergic rhinitis    Occipital neuralgia of left side    SBO (small bowel obstruction) ([facility])       Medications:      Current Outpatient Medications   Medication Sig Dispense Refill    Bacillus Coagulans-Inulin (Probiotic) 1-250 BILLION-MG CAPS Take by mouth      BIOTIN PO Take by mouth daily      Cholecalciferol (VITAMIN D3 PO) Take by mouth daily      estradiol (VIVELLE-DOT) 0.075 MG/24HR PLACE 1 PATCH ON THE SKIN 2 TIMES A WEEK. 8 patch 8    FOLIC ACID PO Take by mouth daily      loratadine (CLARITIN) 10 mg tablet Take 10 mg by mouth daily in the early morning       MILK THISTLE PO Take by mouth daily      pseudoephedrine-guaifenesin (MUCINEX D)  MG per tablet Take 1 tablet by mouth as needed      Turmeric 500 MG CAPS Take by mouth       No current facility-administered medications for this visit.        Allergies:      Allergies   Allergen Reactions    Soybean Oil - Food Allergy GI Intolerance       Family History:     Family History   Problem Relation Age of Onset    Tongue cancer Mother 68    No Known Problems Father     No Known Problems Sister     No Known Problems Daughter     No Known Problems Maternal Grandmother     No Known Problems Maternal Grandfather     No Known Problems Paternal Grandmother     No Known Problems Paternal Grandfather     Ovarian cancer Cousin 50    No Known Problems Son     No Known Problems Son     No Known Problems Sister     No Known Problems Paternal Aunt     No  Known Problems Paternal Aunt     No Known Problems Paternal Aunt     No Known Problems Paternal Aunt     No Known Problems Paternal Aunt        Social History:       Social History     Socioeconomic History    Marital status: /Civil Union     Spouse name: Not on file    Number of children: Not on file    Years of education: Not on file    Highest education level: Not on file   Occupational History    Not on file   Tobacco Use    Smoking status: Former     Types: Cigarettes     Quit date:      Years since quittin.2     Passive exposure: Never    Smokeless tobacco: Never   Vaping Use    Vaping Use: Never used   Substance and Sexual Activity    Alcohol use: Yes     Alcohol/week: 10.0 standard drinks     Types: 9 Glasses of wine, 1 Cans of beer per week     Comment: daily     Drug use: No    Sexual activity: Yes     Partners: Male     Birth control/protection: Female Sterilization   Other Topics Concern    Not on file   Social History Narrative    Not on file     Social Determinants of Health     Financial Resource Strain: Low Risk     Difficulty of Paying Living Expenses: Not hard at all   Food Insecurity: No Food Insecurity    Worried About Running Out of Food in the Last Year: Never true    Ran Out of Food in the Last Year: Never true   Transportation Needs: No Transportation Needs    Lack of Transportation (Medical): No    Lack of Transportation (Non-Medical): No   Physical Activity: Sufficiently Active    Days of Exercise per Week: 5 days    Minutes of Exercise per Session: 90 min   Stress: No Stress Concern Present    Feeling of Stress : Not at all   Social Connections: Not on file   Intimate Partner Violence: Not At Risk    Fear of Current or Ex-Partner: No    Emotionally Abused: No    Physically Abused: No    Sexually Abused: No   Housing Stability: Low Risk     Unable to Pay for Housing in the Last Year: No    Number of Places Lived in the Last Year: 1    Unstable Housing in the Last Year: No  "        Objective:   /80 (BP Location: Right arm, Patient Position: Sitting, Cuff Size: Adult)   Pulse 56   Ht 5' 3.05\" (1.601 m)   Wt 58.1 kg (128 lb)   LMP 10/04/2020 (Approximate)   BMI 22.64 kg/m²     General: Patient is not in any acute/apparent distress, well nourished, well developed and cooperative.   HEENT: normocephalic, atraumatic, moist membranes  Neck: supple  Heart: regular heart rate and rhythm, no murmurs, rubs and or gallops.   Chest: Clear to auscultation bilaterally  Abdomen: soft and non-tender.   Extremities: no edema noted   Skin: no lesions or rash  Musculosketal: no bony abnormalities    Neurologic Examination:   Mental status: alert, awake, oriented X 3 and following commands.     Speech/Language: Speech is fluent without any dysarthria, no aphasia noted, can name, repeat, read and write and comprehension intact    Cranial Nerves:   CN I: smell not tested  CN II: Visual fields full to confrontation,   CN III, IV, VI: Extraocular movements intact bilaterally. Pupils equal round and reactive to light bilaterally.  CN V: Facial sensation is normal.  CN VII: Full and symmetric facial movement.  CN VIII: Hearing is normal.  CN IX, X: Palate elevates symmetrically.   CN XI: Shoulder shrug strength is normal.  CN XII: Tongue midline without atrophy or fasciculations.    Motor:   Strength 5/5 in all 4 extremities.  Bulk/tone - normal.  Fasiculations - none    Sensory:   Sensation intact to soft touch in all 4 extremities.    Cerebellar:   Finger-to-nose intact, normal heel to shin.    Reflexes: 2+ in all 4 extremities  Pathologic reflexes - babinski reflex negative    Gait:   Normal gait       Review of Systems:     Review of Systems   Constitutional: Negative.  Negative for appetite change and fever.   HENT: Positive for trouble swallowing. Negative for hearing loss, tinnitus and voice change.    Eyes: Negative.  Negative for photophobia, pain and visual disturbance.   Respiratory: " Negative.  Negative for shortness of breath.    Cardiovascular: Negative.  Negative for palpitations.   Gastrointestinal: Negative.  Negative for nausea and vomiting.   Endocrine: Negative.  Negative for cold intolerance.   Genitourinary: Negative.  Negative for dysuria, frequency and urgency.   Musculoskeletal: Positive for myalgias and neck pain. Negative for gait problem.   Skin: Negative.  Negative for rash.   Allergic/Immunologic: Negative.    Neurological: Negative.  Negative for dizziness, tremors, seizures, syncope, facial asymmetry, speech difficulty, weakness, light-headedness, numbness and headaches.   Hematological: Negative.  Does not bruise/bleed easily.   Psychiatric/Behavioral: Negative.  Negative for confusion, hallucinations and sleep disturbance.     I have spent a total time of 51 minutes on 3/13/23 in caring for this patient including Risks and benefits of tx options, Instructions for management, Patient and family education, Risk factor reductions, Impressions, Documenting in the medical record, Reviewing / ordering tests, medicine, procedures  , and Obtaining or reviewing history  .

## 2023-03-24 ENCOUNTER — TELEPHONE (OUTPATIENT)
Dept: CARDIOLOGY CLINIC | Facility: CLINIC | Age: 48
End: 2023-03-24

## 2023-05-30 ENCOUNTER — OFFICE VISIT (OUTPATIENT)
Dept: CARDIOLOGY CLINIC | Facility: CLINIC | Age: 48
End: 2023-05-30

## 2023-05-30 VITALS
SYSTOLIC BLOOD PRESSURE: 114 MMHG | WEIGHT: 131 LBS | DIASTOLIC BLOOD PRESSURE: 68 MMHG | BODY MASS INDEX: 22.36 KG/M2 | HEIGHT: 64 IN | OXYGEN SATURATION: 98 % | HEART RATE: 87 BPM

## 2023-05-30 DIAGNOSIS — R01.1 HEART MURMUR: ICD-10-CM

## 2023-05-30 DIAGNOSIS — R00.2 PALPITATION: Primary | ICD-10-CM

## 2023-05-30 NOTE — ASSESSMENT & PLAN NOTE
The patient has a faint systolic murmur  Echocardiogram was not approved  There is no evidence to suggest valvular abnormalities however

## 2023-05-30 NOTE — PROGRESS NOTES
"Assessment/Plan:    Palpitation  Monitor symptoms of palpitation probably associated with premature atrial beats and rare premature ventricular beats  We will continue observation  Heart murmur  The patient has a faint systolic murmur  Echocardiogram was not approved  There is no evidence to suggest valvular abnormalities however  Diagnoses and all orders for this visit:    Palpitation    Heart murmur          Subjective: Minor symptoms of palpitation  Patient ID: Nicki Palomino is a 46 y o  female  The patient presented to this office for the purpose of cardiac follow-up  She is known to have a history of heart murmur that is presumed not to be associated with any significant valvular abnormalities  The patient has mild symptoms of palpitation but denies any symptoms of dizziness or syncope  She denies any symptoms of chest pain  She has no leg edema  The following portions of the patient's history were reviewed and updated as appropriate: allergies, current medications, past family history, past medical history, past social history, past surgical history and problem list     Review of Systems   Respiratory: Negative for apnea, cough, chest tightness, shortness of breath and wheezing  Cardiovascular: Positive for palpitations  Negative for chest pain and leg swelling  Gastrointestinal: Negative for abdominal pain  Neurological: Negative for dizziness and light-headedness  Psychiatric/Behavioral: Negative  Objective: Stable cardiac wise  /68 (BP Location: Left arm, Patient Position: Sitting, Cuff Size: Standard)   Pulse 87   Ht 5' 3 5\" (1 613 m)   Wt 59 4 kg (131 lb)   LMP 10/04/2020 (Approximate)   SpO2 98%   BMI 22 84 kg/m²          Physical Exam  Vitals reviewed  Constitutional:       General: She is not in acute distress  Appearance: She is well-developed  She is not diaphoretic  HENT:      Head: Normocephalic and atraumatic     Neck:      " Thyroid: No thyromegaly  Vascular: No JVD  Cardiovascular:      Rate and Rhythm: Normal rate and regular rhythm  Heart sounds: S1 normal and S2 normal  Murmur heard  Systolic murmur is present with a grade of 1/6  No friction rub  No gallop  Pulmonary:      Effort: Pulmonary effort is normal  No respiratory distress  Breath sounds: No wheezing or rales  Chest:      Chest wall: No tenderness  Abdominal:      Palpations: Abdomen is soft  Musculoskeletal:      Cervical back: Normal range of motion and neck supple  Right lower leg: No edema  Left lower leg: No edema  Skin:     General: Skin is warm and dry  Neurological:      Mental Status: She is oriented to person, place, and time     Psychiatric:         Mood and Affect: Mood normal          Behavior: Behavior normal

## 2023-05-30 NOTE — ASSESSMENT & PLAN NOTE
Monitor symptoms of palpitation probably associated with premature atrial beats and rare premature ventricular beats  We will continue observation

## 2023-08-14 ENCOUNTER — TELEPHONE (OUTPATIENT)
Dept: OBGYN CLINIC | Facility: CLINIC | Age: 48
End: 2023-08-14

## 2023-08-14 NOTE — TELEPHONE ENCOUNTER
Patient here for yearly 1/2023 - hx hysterectomy 2021 -  had intercourse on 8/5/2023 then felt vaginal swelling next day (thought she was starting with yeast infection), then started with red vaginal spotting since 8/9/2023 (on pad or liner) consistently every day since.   Using Vivelle Dot patch

## 2023-08-17 ENCOUNTER — OFFICE VISIT (OUTPATIENT)
Dept: OBGYN CLINIC | Facility: CLINIC | Age: 48
End: 2023-08-17
Payer: COMMERCIAL

## 2023-08-17 VITALS
HEIGHT: 64 IN | SYSTOLIC BLOOD PRESSURE: 116 MMHG | BODY MASS INDEX: 23.22 KG/M2 | DIASTOLIC BLOOD PRESSURE: 76 MMHG | WEIGHT: 136 LBS

## 2023-08-17 DIAGNOSIS — N93.9 VAGINAL BLEEDING: Primary | ICD-10-CM

## 2023-08-17 DIAGNOSIS — E89.40 SURGICAL MENOPAUSE: ICD-10-CM

## 2023-08-17 DIAGNOSIS — Z90.710 H/O TOTAL HYSTERECTOMY: ICD-10-CM

## 2023-08-17 PROCEDURE — 99213 OFFICE O/P EST LOW 20 MIN: CPT | Performed by: OBSTETRICS & GYNECOLOGY

## 2023-08-17 NOTE — PROGRESS NOTES
Assessment/Plan:  She will remain on pelvic rest.  Recommend using a vaginal moisturizer. Will monitor symptoms. She will call if there is any further bleeding. All questions answered at this time. No problem-specific Assessment & Plan notes found for this encounter. Diagnoses and all orders for this visit:    Vaginal bleeding    Surgical menopause    H/O total hysterectomy          Subjective:      Patient ID: Tien Govea is a 46 y.o. female. HPI     K[place]etoh 8/5, then swelling next day, started Monistat, then started red vaginal spotting 8/9 consistently daily basis using panty liner with streaking noted. Has not been sexually active since. She does not find intercourse dry or abrasive. She continues to use the estrogen patch. History of RA-TLHBSO 2021. She was hospitalized with bowel obstruction 2/2023, managed conservatively, requiring no surgical intervention. The following portions of the patient's history were reviewed and updated as appropriate: allergies, current medications, past family history, past medical history, past social history, past surgical history and problem list.    Review of Systems   Constitutional: Negative for fatigue, fever and unexpected weight change. Respiratory: Negative for cough, chest tightness, shortness of breath and wheezing. Cardiovascular: Negative. Negative for chest pain and palpitations. Gastrointestinal: Negative. Negative for abdominal distention, abdominal pain, blood in stool, constipation, diarrhea, nausea and vomiting. Genitourinary: Positive for vaginal bleeding. Negative for difficulty urinating, dyspareunia, dysuria, flank pain, frequency, genital sores, hematuria, pelvic pain, urgency, vaginal discharge and vaginal pain. Skin: Negative for rash.          Objective:      /76   Ht 5' 3.5" (1.613 m)   Wt 61.7 kg (136 lb)   LMP 10/04/2020 (Approximate)   BMI 23.71 kg/m²          Physical Exam  Abdominal: General: Bowel sounds are normal. There is no distension. Palpations: Abdomen is soft. Tenderness: There is no abdominal tenderness. There is no rebound. Genitourinary:     Labia:         Right: No rash, tenderness or lesion. Left: No rash, tenderness or lesion. Vagina: No signs of injury. No vaginal discharge or tenderness. Uterus: Absent. Neurological:      Mental Status: She is oriented to person, place, and time. Psychiatric:         Behavior: Behavior normal.       Using various speculums, external genitalia, introitus scant brown old blood noted. Upon inserting speculum, vagina well estrogenized. Cervix is surgically absent, the cuff is well supported. There are no lesions or abrasions noted. No evidence of bleeding/old blood in vaginal vault.   (Uncertain where blood was coming from)

## 2023-10-30 ENCOUNTER — HOSPITAL ENCOUNTER (OUTPATIENT)
Dept: RADIOLOGY | Age: 48
Discharge: HOME | End: 2023-10-30
Payer: COMMERCIAL

## 2023-10-30 VITALS — HEIGHT: 64 IN | BODY MASS INDEX: 22.2 KG/M2 | WEIGHT: 130 LBS

## 2023-10-30 DIAGNOSIS — Z12.31 VISIT FOR SCREENING MAMMOGRAM: ICD-10-CM

## 2023-10-30 PROCEDURE — 77063 BREAST TOMOSYNTHESIS BI: CPT

## 2023-10-30 PROCEDURE — 77067 SCR MAMMO BI INCL CAD: CPT

## 2023-11-16 ENCOUNTER — HOSPITAL ENCOUNTER (EMERGENCY)
Facility: HOSPITAL | Age: 48
Discharge: HOME | End: 2023-11-16
Attending: EMERGENCY MEDICINE
Payer: COMMERCIAL

## 2023-11-16 VITALS
TEMPERATURE: 97.7 F | RESPIRATION RATE: 18 BRPM | DIASTOLIC BLOOD PRESSURE: 89 MMHG | OXYGEN SATURATION: 100 % | SYSTOLIC BLOOD PRESSURE: 142 MMHG | HEART RATE: 68 BPM
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
## 2023-11-16 DIAGNOSIS — S01.81XA LACERATION OF FOREHEAD, INITIAL ENCOUNTER: ICD-10-CM

## 2023-11-16 PROCEDURE — 99282 EMERGENCY DEPT VISIT SF MDM: CPT

## 2023-11-16 PROCEDURE — 12011 RPR F/E/E/N/L/M 2.5 CM/<: CPT | Performed by: EMERGENCY MEDICINE

## 2023-11-16 PROCEDURE — 99284 EMERGENCY DEPT VISIT MOD MDM: CPT | Performed by: EMERGENCY MEDICINE

## 2023-11-16 RX ORDER — AMOXICILLIN AND CLAVULANATE POTASSIUM 875; 125 MG/1; MG/1
1 TABLET, FILM COATED ORAL ONCE
Status: COMPLETED | OUTPATIENT
Start: 2023-11-16 | End: 2023-11-16

## 2023-11-16 RX ORDER — AMOXICILLIN AND CLAVULANATE POTASSIUM 875; 125 MG/1; MG/1
1 TABLET, FILM COATED ORAL EVERY 12 HOURS
Qty: 14 TABLET | Refills: 0 | Status: SHIPPED | OUTPATIENT
Start: 2023-11-16 | End: 2023-11-23

## 2023-11-16 RX ORDER — LIDOCAINE HYDROCHLORIDE AND EPINEPHRINE 10; 10 MG/ML; UG/ML
10 INJECTION, SOLUTION INFILTRATION; PERINEURAL ONCE
Status: COMPLETED | OUTPATIENT
Start: 2023-11-16 | End: 2023-11-16

## 2023-11-16 RX ADMIN — LIDOCAINE HYDROCHLORIDE,EPINEPHRINE BITARTRATE 10 ML: 10; .01 INJECTION, SOLUTION INFILTRATION; PERINEURAL at 19:22

## 2023-11-16 RX ADMIN — AMOXICILLIN AND CLAVULANATE POTASSIUM 1 TABLET: 875; 125 TABLET, FILM COATED ORAL at 19:51

## 2023-11-17 NOTE — ED PROVIDER NOTES
History  Chief Complaint   Patient presents with    Cat Scratch     Pt reports cat scratch to head. Unsure if rabies is up to date. Reports cat stays inside. Bleeding controlled at this time. 53-year-old female patient presenting with cat scratch onset today. Patient states that she was introducing her home cat to a stray cat when her home cat scratched her in the head. Patient has lacerations to bilateral forehead with possible bite to the left arm. Patient states that this is a home cat. Patient is up-to-date on vaccinations since 2019. Patient's Tdap is updated. Prior to Admission Medications   Prescriptions Last Dose Informant Patient Reported? Taking?    BIOTIN PO  Self Yes No   Sig: Take by mouth daily   Patient not taking: Reported on 8/17/2023   Bacillus Coagulans-Inulin (Probiotic) 1-250 BILLION-MG CAPS  Self Yes No   Sig: Take by mouth   Cholecalciferol (VITAMIN D3 PO)  Self Yes No   Sig: Take by mouth daily   FOLIC ACID PO  Self Yes No   Sig: Take by mouth daily   MILK THISTLE PO  Self Yes No   Sig: Take by mouth daily   Turmeric 500 MG CAPS  Self Yes No   Sig: Take by mouth   Patient not taking: Reported on 8/17/2023   estradiol (VIVELLE-DOT) 0.075 MG/24HR  Self No No   Sig: PLACE 1 PATCH ON THE SKIN 2 TIMES A WEEK.   loratadine (CLARITIN) 10 mg tablet  Self Yes No   Sig: Take 10 mg by mouth daily in the early morning    pseudoephedrine-guaifenesin (MUCINEX D)  MG per tablet  Self Yes No   Sig: Take 1 tablet by mouth as needed      Facility-Administered Medications: None       Past Medical History:   Diagnosis Date    Bowel obstruction ([address]) 02/2022    Gastritis     Age 19    GERD (gastroesophageal reflux disease)     Heart murmur        Past Surgical History:   Procedure Laterality Date    EGD      HYSTERECTOMY      LAPAROSCOPIC CHOLECYSTECTOMY  1992    OOPHORECTOMY      AR LAPS TOTAL HYSTERECT 250 GM/< W/RMVL TUBE/OVARY N/A 2/4/2021    Procedure: ROBOTIC TOTAL LAPAROSCOPIC HYSTERECTOMY, BILATERAL SALPINGO-OOPHORECTOMY;  Surgeon: Daphne Law MD;  [facility];  Service: Gynecology Oncology    WISDOM TOOTH EXTRACTION         Family History   Problem Relation Age of Onset    Tongue cancer Mother 76    No Known Problems Father     No Known Problems Sister     No Known Problems Daughter     No Known Problems Maternal Grandmother     No Known Problems Maternal Grandfather     No Known Problems Paternal Grandmother     No Known Problems Paternal Grandfather     Ovarian cancer Cousin 48    No Known Problems Son     No Known Problems Son     No Known Problems Sister     No Known Problems Paternal Aunt     No Known Problems Paternal Aunt     No Known Problems Paternal Aunt     No Known Problems Paternal Aunt     No Known Problems Paternal Aunt      I have reviewed and agree with the history as documented. E-Cigarette/Vaping    E-Cigarette Use Never User      E-Cigarette/Vaping Substances    Nicotine No     THC No     CBD No     Flavoring No     Other No     Unknown No      Social History     Tobacco Use    Smoking status: Former     Types: Cigarettes     Quit date:      Years since quittin.8     Passive exposure: Never    Smokeless tobacco: Never   Vaping Use    Vaping Use: Never used   Substance Use Topics    Alcohol use: Yes     Alcohol/week: 10.0 standard drinks of alcohol     Types: 9 Glasses of wine, 1 Cans of beer per week     Comment: daily     Drug use: No        Review of Systems   Skin:  Positive for wound (Cat scratches). All other systems reviewed and are negative.       Physical Exam  ED Triage Vitals   Temperature Pulse Respirations Blood Pressure SpO2   230 23   97.7 °F (36.5 °C) 68 18 142/89 100 %      Temp Source Heart Rate Source Patient Position - Orthostatic VS BP Location FiO2 (%)   23 1900 23 18523 --   Oral Monitor Lying Right arm       Pain Score       --                    Orthostatic Vital Signs  Vitals:    11/16/23 1858   BP: 142/89   Pulse: 68   Patient Position - Orthostatic VS: Lying       Physical Exam  Vitals reviewed. Constitutional:       Appearance: Normal appearance. HENT:      Head: Normocephalic and atraumatic. Nose: Nose normal.      Mouth/Throat:      Mouth: Mucous membranes are moist.      Pharynx: Oropharynx is clear. Eyes:      Extraocular Movements: Extraocular movements intact. Conjunctiva/sclera: Conjunctivae normal.   Cardiovascular:      Rate and Rhythm: Normal rate and regular rhythm. Pulses: Normal pulses. Heart sounds: Normal heart sounds. Pulmonary:      Effort: Pulmonary effort is normal.      Breath sounds: Normal breath sounds. Abdominal:      General: Bowel sounds are normal.      Palpations: Abdomen is soft. Tenderness: There is no abdominal tenderness. Musculoskeletal:         General: Normal range of motion. Cervical back: Normal range of motion. Skin:     General: Skin is warm and dry. Findings: Lesion (Lacerations to right forehead and scalp, moderately deep. Actively bleeding. 2 cm each. Patient also has 3 superficial lacerations to left forehead. Small puncture abrasions to left forearm.) present. Neurological:      General: No focal deficit present. Mental Status: She is alert and oriented to person, place, and time. Mental status is at baseline. ED Medications  Medications   lidocaine-epinephrine (XYLOCAINE/EPINEPHRINE) 1 %-1:100,000 injection 10 mL (10 mL Infiltration Given by Other 11/16/23 1922)   amoxicillin-clavulanate (AUGMENTIN) 875-125 mg per tablet 1 tablet (1 tablet Oral Given 11/16/23 1951)       Diagnostic Studies  Results Reviewed       None                   No orders to display         Procedures  Universal Protocol:  Consent: Verbal consent obtained.   Risks and benefits: risks, benefits and alternatives were discussed  Consent given by: patient  Time out: Immediately prior to procedure a "time out" was called to verify the correct patient, procedure, equipment, support staff and site/side marked as required. Laceration repair    Date/Time: 11/16/2023 7:54 PM    Performed by: Antonia Gonzalez MD  Authorized by: Antonia Gonzalez MD  Body area: head/neck  Location details: forehead  Laceration length: 2 cm  Tendon involvement: none  Nerve involvement: none  Anesthesia: local infiltration    Anesthesia:  Local Anesthetic: lidocaine 1% with epinephrine  Anesthetic total: 2 mL      Procedure Details:  Irrigation solution: tap water  Irrigation method: syringe  Amount of cleaning: standard  Skin closure: 4-0 Prolene  Number of sutures: 2  Technique: simple  Approximation: loose  Approximation difficulty: simple  Patient tolerance: patient tolerated the procedure well with no immediate complications      Universal Protocol:  Consent: Verbal consent obtained. Risks and benefits: risks, benefits and alternatives were discussed  Patient identity confirmed: verbally with patient  Laceration repair    Date/Time: 11/16/2023 7:56 PM    Performed by: Antonia Gonzalez MD  Authorized by: Antonia Gonzalez MD  Body area: head/neck  Location details: scalp  Laceration length: 2 cm  Anesthesia: local infiltration    Anesthesia:  Local Anesthetic: lidocaine 1% with epinephrine  Anesthetic total: 2 mL    Wound Dehiscence:  Superficial Wound Dehiscence: simple closure      Procedure Details:  Irrigation solution: tap water  Irrigation method: syringe  Skin closure: staples  Number of sutures: 3  Approximation difficulty: simple            ED Course                             SBIRT 20yo+      Flowsheet Row Most Recent Value   Initial Alcohol Screen: US AUDIT-C     1. How often do you have a drink containing alcohol? 0 Filed at: 11/16/2023 6535   3b. FEMALE Any Age, or MALE 65+: How often do you have 4 or more drinks on one occassion?  0 Filed at: 11/16/2023 8465 Audit-C Score 0 Filed at: 11/16/2023 1859   CHERYL: How many times in the past year have you. .. Used an illegal drug or used a prescription medication for non-medical reasons? Never Filed at: 11/16/2023 1859                  Medical Decision Making  78-year-old female patient presenting with cat scratch. The cat that scratched her was her cat that stays at home. On exam, patient has lacerations to her forehead and scalp. Superficial lacerations to the left, moderately deep lacerations to the right. Right forehead laceration was sutured. Right scalp laceration was stapled with 3 staples. Lacerations were cleaned with fluids. Stable for discharge with follow-up with PCP. Instructed to return in a week for suture and staple removal.  Patient was discharged on Augmentin for possible cat bite. Patient's Tdap was updated on 2023 in January. Risk  Prescription drug management. Disposition  Final diagnoses:   Cat scratch   Laceration of forehead, initial encounter   Cat bite, initial encounter     Time reflects when diagnosis was documented in both MDM as applicable and the Disposition within this note       Time User Action Codes Description Comment    11/16/2023  7:57 PM Lady Fisherman [facility] Add [P32.41HQ] Cat scratch     11/16/2023  7:57 PM Ady [address] Laceration of forehead, initial encounter     11/16/2023  7:58 PM Phyllis Glynn Add [W55.01XA] Cat bite, initial encounter           ED Disposition       ED Disposition   Discharge    Condition   Stable    Date/Time   Thu Nov 16, 2023 [address] discharge to home/self care.                    Follow-up Information       Follow up With Specialties Details Why Contact Info Additional 1200 [facility] Medicine Schedule an appointment as soon as possible for a visit   1211 [facility] 24 [phone]       [address] [place] [address] Emergency Department Emergency Medicine Schedule an appointment as soon as possible for a visit in 1 week  [address] [address]  [facility] Emergency Department, [address], [place], [place], [facility]            Discharge Medication List as of 11/16/2023  7:59 PM        START taking these medications    Details   amoxicillin-clavulanate (AUGMENTIN) 875-125 mg per tablet Take 1 tablet by mouth every 12 (twelve) hours for 7 days, Starting Thu 11/16/2023, Until Thu 11/23/2023, Normal           CONTINUE these medications which have NOT CHANGED    Details   Bacillus Coagulans-Inulin (Probiotic) 1-250 BILLION-MG CAPS Take by mouth, Historical Med      BIOTIN PO Take by mouth daily, Historical Med      Cholecalciferol (VITAMIN D3 PO) Take by mouth daily, Historical Med      estradiol (VIVELLE-DOT) 0.075 MG/24HR PLACE 1 PATCH ON THE SKIN 2 TIMES A WEEK., Normal      FOLIC ACID PO Take by mouth daily, Historical Med      loratadine (CLARITIN) 10 mg tablet Take 10 mg by mouth daily in the early morning , Historical Med      MILK THISTLE PO Take by mouth daily, Historical Med      pseudoephedrine-guaifenesin (MUCINEX D)  MG per tablet Take 1 tablet by mouth as needed, Historical Med      Turmeric 500 MG CAPS Take by mouth, Historical Med           No discharge procedures on file. PDMP Review         Value Time User    PDMP Reviewed  Yes 2/4/2021 11:53 AM Vipin Johnson MD             ED Provider  Attending physically available and evaluated Minus Boast. I managed the patient along with the ED Attending.     Electronically Signed by           Kathy Brink MD  11/16/23 0700

## 2023-11-17 NOTE — ED ATTENDING ATTESTATION
Final Diagnoses:     1. Cat scratch    2. Laceration of forehead, initial encounter    3. Cat bite, initial encounter           I, Rivas Meng MD, saw and evaluated the patient. All available labs and X-rays were ordered by me or the resident / non-physician and have been reviewed by myself. I discussed the patient with the resident / non-physician and agree with the resident's / non-physician practitioner's findings and plan as documented in the resident's / non-physician practicitioner's note, except where noted. At this point, I agree with the current assessment done in the ED. I was present during key portions of all procedures performed unless otherwise stated. HPI: This is a 46 y.o. female presenting for evaluation of mauled by a cat  It's her own cat; there's a stray cat that might be introduced to them? During introduction, tried to bring her cat in, but the cat got angry  Scratched on head. 3 lacerations superficial on the LEFT  On RIGHT, 1 laceration. Bite on LEFT arm (superficial puncture marks)   NURSING TRIAGE:   Chief Complaint   Patient presents with    Cat Scratch     Pt reports cat scratch to head. Unsure if rabies is up to date. Reports cat stays inside. Bleeding controlled at this time. PHYSICAL: ASSESSMENT + PLAN:   Pertinent: Lacerations as above. General: VS reviewed  Appears in NAD  awake, alert. Well-nourished, well-developed. Appears stated age. Speaking normally in full sentences. Head: Normocephalic, atraumatic  Eyes: EOM-I. No diplopia. No hyphema. No subconjunctival hemorrhages. Symmetrical lids. ENT: Atraumatic external nose and ears. MMM  No malocclusion. No stridor. Normal phonation. No drooling. Normal swallowing. Neck: No JVD. CV: No pallor noted  Lungs:   No tachypnea  No respiratory distress  MSK:   FROM spontaneously  Skin: as above   Neuro: Awake, alert, GCS15, CN II-XII grossly intact.    Motor grossly intact. Psychiatric/Behavioral: interacting normally; appropriate mood/affect.  Exam: deferred    Vitals:    23 1858 23 1900   BP: 142/89    BP Location: Right arm    Pulse: 68    Resp: 18    Temp:  97.7 °F (36.5 °C)   TempSrc:  Oral   SpO2: 100%     Tetanus uknown ? give today vs check chart. Beni. Her own cat; can observe 10 days. Approximate wounds  Discussed skin care. There are no obvious limitations to social determinants of care. Nursing note reviewed. Vitals reviewed. Orders placed by myself and/or advanced practitioner / resident. Previous chart was reviewed  No language barrier. History obtained from patient. There are no limitations to the history obtained:     Past Medical: Past Surgical:    has a past medical history of Bowel obstruction ([address]) (2022), Gastritis, GERD (gastroesophageal reflux disease), and Heart murmur. has a past surgical history that includes Laparoscopic cholecystectomy (); [place] tooth extraction (); EGD; pr laps total hysterect 250 gm/< w/rmvl tube/ovary (N/A, 2021); Hysterectomy; and Oophorectomy. Social: Cardiac (Echo/Cath)   Social History     Substance and Sexual Activity   Alcohol Use Yes    Alcohol/week: 10.0 standard drinks of alcohol    Types: 9 Glasses of wine, 1 Cans of beer per week    Comment: daily      Social History     Tobacco Use   Smoking Status Former    Types: Cigarettes    Quit date:     Years since quittin.8    Passive exposure: Never   Smokeless Tobacco Never     Social History     Substance and Sexual Activity   Drug Use No    No results found for this or any previous visit. No results found for this or any previous visit. No results found for this or any previous visit.      Labs: Imaging:   Labs Reviewed - No data to display No orders to display      Medications: Code Status:   Medications   lidocaine-epinephrine (XYLOCAINE/EPINEPHRINE) 1 %-1:100,000 injection 10 mL (10 mL Infiltration Given by Other 11/16/23 1922)   amoxicillin-clavulanate (AUGMENTIN) 875-125 mg per tablet 1 tablet (1 tablet Oral Given 11/16/23 1951)    Code Status: Prior  Advance Directive and Living Will:      Power of :    POLST:       Orders Placed This Encounter   Procedures    Laceration repair    Laceration repair     Time reflects when diagnosis was documented in both MDM as applicable and the Disposition within this note       Time User Action Codes Description Comment    11/16/2023  7:57 PM Rubye Conquest Add [S00.41LT] Cat scratch     11/16/2023  7:57 PM Rubye Conquest Add [S01.81XA] Laceration of forehead, initial encounter     11/16/2023  7:58 PM Rubye Conquest Add [W55.01XA] Cat bite, initial encounter           ED Disposition       ED Disposition   Discharge    Condition   Stable    Date/Time   Thu Nov 16, 2023  7:57 PM    Comment   Aftab Ashraf discharge to home/self care. Follow-up Information       Follow up With Specialties Details Why Contact Info Additional 1200 [facility] Family Medicine Schedule an appointment as soon as possible for a visit   [address] [phone]       [address] [place] [address] Emergency Department Emergency Medicine Schedule an appointment as soon as possible for a visit in 1 week  [address] [address]  [facility] Emergency Department, [address] [facility], [facility], [address] [facility],[address]Th Floor, [facility]          Patient's Medications   Discharge Prescriptions    AMOXICILLIN-CLAVULANATE (AUGMENTIN) 875-125 MG PER TABLET    Take 1 tablet by mouth every 12 (twelve) hours for 7 days       Start Date: 11/16/2023End Date: 11/23/2023       Order Dose: 1 tablet       Quantity: 14 tablet    Refills: 0     No discharge procedures on file. Prior to Admission Medications   Prescriptions Last Dose Informant Patient Reported? Taking?    BIOTIN PO  Self Yes No   Sig: Take by mouth daily   Patient not taking: Reported on 8/17/2023   Bacillus Coagulans-Inulin (Probiotic) 1-250 BILLION-MG CAPS  Self Yes No   Sig: Take by mouth   Cholecalciferol (VITAMIN D3 PO)  Self Yes No   Sig: Take by mouth daily   FOLIC ACID PO  Self Yes No   Sig: Take by mouth daily   MILK THISTLE PO  Self Yes No   Sig: Take by mouth daily   Turmeric 500 MG CAPS  Self Yes No   Sig: Take by mouth   Patient not taking: Reported on 8/17/2023   estradiol (VIVELLE-DOT) 0.075 MG/24HR  Self No No   Sig: PLACE 1 PATCH ON THE SKIN 2 TIMES A WEEK.   loratadine (CLARITIN) 10 mg tablet  Self Yes No   Sig: Take 10 mg by mouth daily in the early morning    pseudoephedrine-guaifenesin (MUCINEX D)  MG per tablet  Self Yes No   Sig: Take 1 tablet by mouth as needed      Facility-Administered Medications: None                        Portions of the record may have been created with voice recognition software. Occasional wrong word or "sound a like" substitutions may have occurred due to the inherent limitations of voice recognition software. Read the chart carefully and recognize, using context, where substitutions have occurred.     Electronically signed by:  Rosendo Goyal

## 2023-11-26 ENCOUNTER — OFFICE VISIT (OUTPATIENT)
Dept: URGENT CARE | Age: 48
End: 2023-11-26
Payer: COMMERCIAL

## 2023-11-26 VITALS
RESPIRATION RATE: 18 BRPM | HEART RATE: 66 BPM | TEMPERATURE: 98 F | BODY MASS INDEX: 22.67 KG/M2 | OXYGEN SATURATION: 99 % | WEIGHT: 130 LBS

## 2023-11-26 DIAGNOSIS — Z48.02 ENCOUNTER FOR STAPLE REMOVAL: Primary | ICD-10-CM

## 2023-11-26 PROCEDURE — S9083 URGENT CARE CENTER GLOBAL: HCPCS

## 2023-11-26 PROCEDURE — G0382 LEV 3 HOSP TYPE B ED VISIT: HCPCS

## 2023-11-26 NOTE — PROGRESS NOTES
[facility]        NAME: Peter Goldberg is a 46 y.o. female  : 1975    MRN: [id number]  DATE: 2023  TIME: 12:14 PM      Assessment and Plan     Encounter for staple removal [Z48.02]  1. Encounter for staple removal  Suture removal            Patient Instructions     recommended to apply sun lotion to area for the next 6 months to prevent scarring. Can apply topical Neosporin as needed. PCP follow-up in 3 to 5 days. Proceed to the ER if symptoms worsen. Chief Complaint     Chief Complaint   Patient presents with    Suture / Staple Removal         History of Present Illness     Patient is a 49-year-old female who presents for suture removal.  Patient 3 staples applied to the right scalp approximately 10 days ago. Patient also received stitches to the forehead that she said she removed on her own. Patient was previously scribed Augmentin at time of injury which she completed. Suture / Staple Removal        Review of Systems     Review of Systems   Constitutional:  Negative for fever. Gastrointestinal:  Negative for diarrhea and vomiting. Skin:  Positive for wound. All other systems reviewed and are negative.         Current Medications       Current Outpatient Medications:     Bacillus Coagulans-Inulin (Probiotic) 1-250 BILLION-MG CAPS, Take by mouth, Disp: , Rfl:     Cholecalciferol (VITAMIN D3 PO), Take by mouth daily, Disp: , Rfl:     estradiol (VIVELLE-DOT) 0.075 MG/24HR, PLACE 1 PATCH ON THE SKIN 2 TIMES A WEEK., Disp: 8 patch, Rfl: 8    FOLIC ACID PO, Take by mouth daily, Disp: , Rfl:     loratadine (CLARITIN) 10 mg tablet, Take 10 mg by mouth daily in the early morning , Disp: , Rfl:     MILK THISTLE PO, Take by mouth daily, Disp: , Rfl:     BIOTIN PO, Take by mouth daily (Patient not taking: Reported on 2023), Disp: , Rfl:     pseudoephedrine-guaifenesin (MUCINEX D)  MG per tablet, Take 1 tablet by mouth as needed (Patient not taking: Reported on 11/26/2023), Disp: , Rfl:     Turmeric 500 MG CAPS, Take by mouth (Patient not taking: Reported on 8/17/2023), Disp: , Rfl:     Current Allergies     Allergies as of 11/26/2023 - Reviewed 11/26/2023   Allergen Reaction Noted    Soybean oil - food allergy GI Intolerance 01/12/2021              The following portions of the patient's history were reviewed and updated as appropriate: allergies, current medications, past family history, past medical history, past social history, past surgical history and problem list.     Past Medical History:   Diagnosis Date    Bowel obstruction ([address]) 02/2022    Gastritis     Age 19    GERD (gastroesophageal reflux disease)     Heart murmur        Past Surgical History:   Procedure Laterality Date    EGD      HYSTERECTOMY      LAPAROSCOPIC CHOLECYSTECTOMY  1992    OOPHORECTOMY      NE LAPS TOTAL HYSTERECT 250 GM/< W/RMVL TUBE/OVARY N/A 2/4/2021    Procedure: ROBOTIC TOTAL LAPAROSCOPIC HYSTERECTOMY, BILATERAL SALPINGO-OOPHORECTOMY;  Surgeon: Katherine Smith MD;  Location: [facility];  Service: Gynecology Oncology    WISDOM TOOTH EXTRACTION  1991       Family History   Problem Relation Age of Onset    Tongue cancer Mother 76    No Known Problems Father     No Known Problems Sister     No Known Problems Daughter     No Known Problems Maternal Grandmother     No Known Problems Maternal Grandfather     No Known Problems Paternal Grandmother     No Known Problems Paternal Grandfather     Ovarian cancer Cousin 48    No Known Problems Son     No Known Problems Son     No Known Problems Sister     No Known Problems Paternal Aunt     No Known Problems Paternal Aunt     No Known Problems Paternal Aunt     No Known Problems Paternal Aunt     No Known Problems Paternal Aunt          Medications have been verified.         Objective     Pulse 66   Temp 98 °F (36.7 °C)   Resp 18   Wt 59 kg (130 lb)   LMP 10/04/2020 (Approximate)   SpO2 99%   BMI 22.67 kg/m²   Patient's last menstrual period was 10/04/2020 (approximate). Physical Exam     Physical Exam  Vitals and nursing note reviewed. Constitutional:       Appearance: Normal appearance. HENT:      Head:     Skin:     General: Skin is warm. Capillary Refill: Capillary refill takes less than 2 seconds. Neurological:      Mental Status: She is alert. Psychiatric:         Mood and Affect: Mood normal.         Behavior: Behavior normal.         Thought Content: Thought content normal.         Judgment: Judgment normal.     Suture removal    Date/Time: 11/26/2023 11:30 AM    Performed by: BINH Noyola  Authorized by: BINH Noyola  Universal Protocol:  Consent: Verbal consent obtained. Risks and benefits: risks, benefits and alternatives were discussed  Consent given by: patient  Patient understanding: patient states understanding of the procedure being performed      Patient location:  Clinic  Location:     Location:  [address] [place] [address] location:  Scalp  Procedure details: Tools used: Other (comment) (staple remover)    Wound appearance:  Clean, no sign(s) of infection, good wound healing and nontender    Number of staples removed:  3  Post-procedure details:     Patient tolerance of procedure:   Tolerated well, no immediate complications

## 2023-11-26 NOTE — PATIENT INSTRUCTIONS
recommended to apply sun lotion to area for the next 6 months to prevent scarring. Can apply topical Neosporin as needed. PCP follow-up in 3 to 5 days. Proceed to the ER if symptoms worsen.

## 2024-01-23 ENCOUNTER — ANNUAL EXAM (OUTPATIENT)
Dept: OBGYN CLINIC | Facility: CLINIC | Age: 49
End: 2024-01-23
Payer: COMMERCIAL

## 2024-01-23 VITALS
WEIGHT: 133.6 LBS | SYSTOLIC BLOOD PRESSURE: 108 MMHG | HEIGHT: 64 IN | DIASTOLIC BLOOD PRESSURE: 70 MMHG | BODY MASS INDEX: 22.81 KG/M2

## 2024-01-23 DIAGNOSIS — Z90.722 H/O TOTAL HYSTERECTOMY WITH BILATERAL SALPINGO-OOPHORECTOMY (BSO): ICD-10-CM

## 2024-01-23 DIAGNOSIS — Z87.42 HISTORY OF OVARIAN CYST: ICD-10-CM

## 2024-01-23 DIAGNOSIS — N83.201 BILATERAL OVARIAN CYSTS: ICD-10-CM

## 2024-01-23 DIAGNOSIS — Z01.419 ENCOUNTER FOR ANNUAL ROUTINE GYNECOLOGICAL EXAMINATION: Primary | ICD-10-CM

## 2024-01-23 DIAGNOSIS — Z90.710 H/O TOTAL HYSTERECTOMY WITH BILATERAL SALPINGO-OOPHORECTOMY (BSO): ICD-10-CM

## 2024-01-23 DIAGNOSIS — E89.40 SURGICAL MENOPAUSE: ICD-10-CM

## 2024-01-23 DIAGNOSIS — Z12.31 ENCOUNTER FOR SCREENING MAMMOGRAM FOR MALIGNANT NEOPLASM OF BREAST: ICD-10-CM

## 2024-01-23 DIAGNOSIS — Z90.79 H/O TOTAL HYSTERECTOMY WITH BILATERAL SALPINGO-OOPHORECTOMY (BSO): ICD-10-CM

## 2024-01-23 DIAGNOSIS — N83.202 BILATERAL OVARIAN CYSTS: ICD-10-CM

## 2024-01-23 PROCEDURE — S0612 ANNUAL GYNECOLOGICAL EXAMINA: HCPCS | Performed by: OBSTETRICS & GYNECOLOGY

## 2024-01-23 RX ORDER — ESTRADIOL 0.07 MG/D
1 FILM, EXTENDED RELEASE TRANSDERMAL 2 TIMES WEEKLY
Qty: 8 PATCH | Refills: 8 | Status: SHIPPED | OUTPATIENT
Start: 2024-01-25

## 2024-01-23 NOTE — PROGRESS NOTES
Assessment/Plan:  Pap smear deferred due to low risk test.  Encourage self breast examination as well as calcium supplementation.  Continue annual mammogram.  Reviewed colon cancer screening, up-to-date.  Discussed estrogen replacement patch versus changing to oral.  All questions answered.  She will notify me if she is interested.  Return to office in 1 year or as needed  No problem-specific Assessment & Plan notes found for this encounter.       Diagnoses and all orders for this visit:    Encounter for annual routine gynecological examination    Encounter for screening mammogram for malignant neoplasm of breast    H/O total hysterectomy with bilateral salpingo-oophorectomy (BSO)    History of ovarian cyst    Surgical menopause    Bilateral ovarian cysts  -     estradiol (VIVELLE-DOT) 0.075 MG/24HR; Place 1 patch on the skin 2 (two) times a week          Subjective:      Patient ID: Ramya Yeung is a 48 y.o. female.    HPI    This is a pleasant 48-year-old female P3 ( x 3) presents for her GYN exam.  Her GYN history significant for RA-TL 2021 for bilateral complex ovarian cysts revealing benignBSO cystadenoma.  She has been on estrogen replacement since then, transdermal.  Over the years she has noticed more skin irritation with the patch.  She has also had increased episodes of insomnia.  Her mood and irritability have improved since she started estrogen.  There is been no further vaginal bleeding or spotting.  No changes in bowel or bladder function.  She has been in a monogamous relationship for over 14 years.  Pap smears have always been normal.  Hysterectomy/cervical specimen was also benign.    Insomnia    Mood improved    Patch, dry     10/23 mammo  Colon  f/u 10 yrs    The following portions of the patient's history were reviewed and updated as appropriate: allergies, current medications, past family history, past medical history, past social history, past surgical history, and problem  "list.    Review of Systems   Constitutional:  Negative for fatigue, fever and unexpected weight change.   Respiratory:  Negative for cough, chest tightness, shortness of breath and wheezing.    Cardiovascular: Negative.  Negative for chest pain and palpitations.   Gastrointestinal: Negative.  Negative for abdominal distention, abdominal pain, blood in stool, constipation, diarrhea, nausea and vomiting.   Genitourinary: Negative.  Negative for difficulty urinating, dyspareunia, dysuria, flank pain, frequency, genital sores, hematuria, pelvic pain, urgency, vaginal bleeding, vaginal discharge and vaginal pain.   Skin:  Negative for rash.         Objective:      /70   Ht 5' 3.5\" (1.613 m)   Wt 60.6 kg (133 lb 9.6 oz)   LMP 10/04/2020 (Approximate)   BMI 23.29 kg/m²          Physical Exam  Constitutional:       Appearance: Normal appearance. She is well-developed.   HENT:      Head: Normocephalic and atraumatic.   Cardiovascular:      Rate and Rhythm: Normal rate and regular rhythm.   Pulmonary:      Effort: Pulmonary effort is normal.      Breath sounds: Normal breath sounds.   Chest:   Breasts:     Right: No inverted nipple, mass, nipple discharge, skin change or tenderness.      Left: No inverted nipple, mass, nipple discharge, skin change or tenderness.   Abdominal:      General: Bowel sounds are normal. There is no distension.      Palpations: Abdomen is soft.      Tenderness: There is no abdominal tenderness. There is no guarding or rebound.   Genitourinary:     Labia:         Right: No rash, tenderness or lesion.         Left: No rash, tenderness or lesion.       Vagina: Normal. No signs of injury. No vaginal discharge or tenderness.      Uterus: Absent.       Adnexa:         Right: No mass, tenderness or fullness.          Left: No mass, tenderness or fullness.     Neurological:      Mental Status: She is alert.   Psychiatric:         Behavior: Behavior normal.         External genitalia is within " normal limits.  The vagina is well estrogenized.  Cervix is surgically absent.  The cuff is well supported.

## 2024-02-08 ENCOUNTER — PATIENT MESSAGE (OUTPATIENT)
Dept: OBGYN CLINIC | Facility: CLINIC | Age: 49
End: 2024-02-08

## 2024-02-08 DIAGNOSIS — Z78.0 MENOPAUSE: ICD-10-CM

## 2024-02-08 DIAGNOSIS — Z78.0 MENOPAUSE: Primary | ICD-10-CM

## 2024-02-08 RX ORDER — ESTRADIOL 0.5 MG/1
0.5 TABLET ORAL DAILY
Qty: 30 TABLET | Refills: 3 | Status: SHIPPED | OUTPATIENT
Start: 2024-02-08

## 2024-02-21 PROBLEM — J01.10 ACUTE NON-RECURRENT FRONTAL SINUSITIS: Status: RESOLVED | Noted: 2020-02-13 | Resolved: 2024-02-21

## 2024-02-21 PROBLEM — H10.33 ACUTE BACTERIAL CONJUNCTIVITIS OF BOTH EYES: Status: RESOLVED | Noted: 2020-02-13 | Resolved: 2024-02-21

## 2024-03-19 ENCOUNTER — TELEPHONE (OUTPATIENT)
Age: 49
End: 2024-03-19

## 2024-03-19 RX ORDER — ESTRADIOL 0.5 MG/1
0.5 TABLET ORAL DAILY
Qty: 30 TABLET | Refills: 5 | Status: SHIPPED | OUTPATIENT
Start: 2024-03-19

## 2024-03-19 NOTE — TELEPHONE ENCOUNTER
Patient called requesting refill for Estradiol. Patient made aware medication was refilled on 3/19/24 for 30 with 5 refills to [facility] pharmacy. Patient instructed to contact the pharmacy to obtain refills of medication. Patient verbalized understanding.

## 2024-08-16 DIAGNOSIS — Z78.0 MENOPAUSE: ICD-10-CM

## 2024-08-16 RX ORDER — ESTRADIOL 0.5 MG/1
TABLET ORAL
Qty: 30 TABLET | Refills: 5 | Status: SHIPPED | OUTPATIENT
Start: 2024-08-16

## 2024-10-31 ENCOUNTER — HOSPITAL ENCOUNTER (OUTPATIENT)
Dept: RADIOLOGY | Age: 49
Discharge: HOME | End: 2024-10-31
Payer: COMMERCIAL

## 2024-10-31 DIAGNOSIS — Z12.31 VISIT FOR SCREENING MAMMOGRAM: ICD-10-CM

## 2024-10-31 PROCEDURE — 77067 SCR MAMMO BI INCL CAD: CPT

## 2024-10-31 PROCEDURE — 77063 BREAST TOMOSYNTHESIS BI: CPT

## 2024-11-08 ENCOUNTER — HOSPITAL ENCOUNTER (OUTPATIENT)
Dept: ULTRASOUND IMAGING | Facility: CLINIC | Age: 49
End: 2024-11-08
Payer: COMMERCIAL

## 2024-11-08 DIAGNOSIS — R92.8 ABNORMAL MAMMOGRAM: ICD-10-CM

## 2024-11-08 PROCEDURE — 76642 ULTRASOUND BREAST LIMITED: CPT

## 2025-01-30 ENCOUNTER — ANNUAL EXAM (OUTPATIENT)
Dept: OBGYN CLINIC | Facility: CLINIC | Age: 50
End: 2025-01-30
Payer: COMMERCIAL

## 2025-01-30 VITALS
SYSTOLIC BLOOD PRESSURE: 100 MMHG | BODY MASS INDEX: 23.73 KG/M2 | DIASTOLIC BLOOD PRESSURE: 66 MMHG | WEIGHT: 139 LBS | HEIGHT: 64 IN

## 2025-01-30 DIAGNOSIS — Z90.710 H/O TOTAL HYSTERECTOMY WITH BILATERAL SALPINGO-OOPHORECTOMY (BSO): ICD-10-CM

## 2025-01-30 DIAGNOSIS — Z01.419 ENCOUNTER FOR ANNUAL ROUTINE GYNECOLOGICAL EXAMINATION: Primary | ICD-10-CM

## 2025-01-30 DIAGNOSIS — E89.40 SURGICAL MENOPAUSE: ICD-10-CM

## 2025-01-30 DIAGNOSIS — Z12.31 ENCOUNTER FOR SCREENING MAMMOGRAM FOR MALIGNANT NEOPLASM OF BREAST: ICD-10-CM

## 2025-01-30 DIAGNOSIS — Z90.79 H/O TOTAL HYSTERECTOMY WITH BILATERAL SALPINGO-OOPHORECTOMY (BSO): ICD-10-CM

## 2025-01-30 DIAGNOSIS — Z90.722 H/O TOTAL HYSTERECTOMY WITH BILATERAL SALPINGO-OOPHORECTOMY (BSO): ICD-10-CM

## 2025-01-30 PROCEDURE — S0612 ANNUAL GYNECOLOGICAL EXAMINA: HCPCS | Performed by: OBSTETRICS & GYNECOLOGY

## 2025-01-30 NOTE — PROGRESS NOTES
Name: Ramya Yeung      : 1975      MRN: [id number]  Encounter Provider: Cheryl Jaquez DO  Encounter Date: 2025   Encounter department: [facility]  :  Assessment & Plan  Encounter for annual routine gynecological examination         Encounter for screening mammogram for malignant neoplasm of breast         H/O total hysterectomy with bilateral salpingo-oophorectomy (BSO)         Surgical menopause         Pap smear deferred due to low risk status.  Encouraged self breast examination as well as calcium supplementation.  She is scheduled for 6-month follow-up breast ultrasound 2025 followed by her annual screening mammogram 2025.  Reviewed colon cancer screening, up to date.  Discussed estrogen replacement therapy.  At this point she would like to discontinue.  She will monitor her symptoms over the next month and call with update.  Continue follow-up with primary care.  Return to office in 1 year or as needed    History of Present Illness   HPI  Ramya Yeung is a 49 y.o. female who presents     This is a pleasant 49-year-old female P3 ( x 3) presents for her GYN exam.  Her GYN history significant for [facility] BSO 2021 revealing benign cystadenoma.  She was then placed on estrogen replacement therapy since then.  At this point she is inquiring discontinuing estrogen.  She denies any hot flashes or night sweats.  No changes in bowel or bladder function.  She has been in a monogamous relationship for over 15 years.  Pap smears have always been normal.  Hysterectomy/cervical specimen was also benign.      Colonoscopy 2023, normal  Mammogram  2023 history of small bowel obstruction, managed conservatively, CT scan of abdomen and pelvis more prominent right lower quadrant    History obtained from: patient    Review of Systems   Constitutional:  Negative for fatigue, fever and unexpected weight change.   Respiratory:  Negative for cough, chest tightness, shortness  "of breath and wheezing.    Cardiovascular: Negative.  Negative for chest pain and palpitations.   Gastrointestinal: Negative.  Negative for abdominal distention, abdominal pain, blood in stool, constipation, diarrhea, nausea and vomiting.   Genitourinary: Negative.  Negative for difficulty urinating, dyspareunia, dysuria, flank pain, frequency, genital sores, hematuria, pelvic pain, urgency, vaginal bleeding, vaginal discharge and vaginal pain.   Skin:  Negative for rash.     Current Outpatient Medications on File Prior to Visit   Medication Sig Dispense Refill    Bacillus Coagulans-Inulin (Probiotic) 1-250 BILLION-MG CAPS Take by mouth      BIOTIN PO Take by mouth daily      Cholecalciferol (VITAMIN D3 PO) Take by mouth daily      estradiol (ESTRACE) 0.5 MG tablet TAKE 1 TABLET(0.5 MG) BY MOUTH DAILY 30 tablet 5    FOLIC ACID PO Take by mouth daily      loratadine (CLARITIN) 10 mg tablet Take 10 mg by mouth daily in the early morning       MILK THISTLE PO Take by mouth daily      pseudoephedrine-guaifenesin (MUCINEX D)  MG per tablet Take 1 tablet by mouth as needed      Turmeric 500 MG CAPS Take by mouth       No current facility-administered medications on file prior to visit.         Objective   /66   Ht 5' 3.5\" (1.613 m)   Wt 63 kg (139 lb)   LMP 10/04/2020 (Approximate)   BMI 24.24 kg/m²      Physical Exam  Constitutional:       Appearance: Normal appearance. She is well-developed.   HENT:      Head: Normocephalic and atraumatic.   Cardiovascular:      Rate and Rhythm: Normal rate and regular rhythm.   Pulmonary:      Effort: Pulmonary effort is normal.      Breath sounds: Normal breath sounds.   Chest:   Breasts:     Right: No inverted nipple, mass, nipple discharge, skin change or tenderness.      Left: No inverted nipple, mass, nipple discharge, skin change or tenderness.   Abdominal:      General: Bowel sounds are normal. There is no distension.      Palpations: Abdomen is soft.      " Tenderness: There is no abdominal tenderness. There is no guarding or rebound.   Genitourinary:     Labia:         Right: No rash, tenderness or lesion.         Left: No rash, tenderness or lesion.       Vagina: Normal. No signs of injury. No vaginal discharge or tenderness.      Uterus: Absent.       Adnexa:         Right: No mass, tenderness or fullness.          Left: No mass, tenderness or fullness.     Neurological:      Mental Status: She is alert.   Psychiatric:         Behavior: Behavior normal.       external genitalia is within normal limits.  The vagina is well estrogenized.  Cervix is surgically absent.  The cuff is well supported.  Administrative Statements   I have spent a total time of 25 minutes in caring for this patient on the day of the visit/encounter including Impressions, Counseling / Coordination of care, Documenting in the medical record, Reviewing / ordering tests, medicine, procedures  , and Obtaining or reviewing history  .

## 2025-02-25 ENCOUNTER — OFFICE VISIT (OUTPATIENT)
Dept: SURGERY | Facility: CLINIC | Age: 50
End: 2025-02-25
Payer: COMMERCIAL

## 2025-02-25 DIAGNOSIS — K43.9 ABDOMINAL WALL HERNIA: Primary | ICD-10-CM

## 2025-02-25 PROCEDURE — 99202 OFFICE O/P NEW SF 15 MIN: CPT | Performed by: SURGERY

## 2025-02-25 NOTE — PROGRESS NOTES
Name: Ramya Yeung      : 1975      MRN: [id number]  Encounter Provider: Keith Jensen MD  Encounter Date: 2025   [facility]  :  Assessment & Plan  Abdominal wall hernia  I told her I did not feel any hernia defects.  Will order an ultrasound to see if there is anything visible.  I told her should it occur again, she could take a picture of it and use [employer] to send it to us.  Will call her with ultrasound results when available  Orders:  •  US abdomen limited; Future        History of Present Illness   HPI  Ramya Yeung is a 50 y.o. female who presents for evaluation of hernia.  She gets a very painful bulging in the left upper quadrant on occasion when she moves a certain direction..  No other issues      Review of Systems       Objective   LMP 10/04/2020 (Approximate)      Physical Exam  Abdomen: Flat soft nontender, several laparoscopic incisions well-healed, no abdominal wall defects in the left upper quadrant in the area of interest

## 2025-03-25 ENCOUNTER — HOSPITAL ENCOUNTER (OUTPATIENT)
Dept: RADIOLOGY | Age: 50
Discharge: HOME | End: 2025-03-25
Payer: COMMERCIAL

## 2025-03-25 DIAGNOSIS — K43.9 ABDOMINAL WALL HERNIA: ICD-10-CM

## 2025-03-25 PROCEDURE — 76705 ECHO EXAM OF ABDOMEN: CPT

## 2025-04-14 DIAGNOSIS — Z78.0 MENOPAUSE: ICD-10-CM

## 2025-04-14 RX ORDER — ESTRADIOL 0.5 MG/1
0.5 TABLET ORAL DAILY
Qty: 90 TABLET | Refills: 2 | Status: SHIPPED | OUTPATIENT
Start: 2025-04-14

## 2025-05-12 ENCOUNTER — HOSPITAL ENCOUNTER (OUTPATIENT)
Dept: ULTRASOUND IMAGING | Facility: CLINIC | Age: 50
Discharge: HOME | End: 2025-05-12
Payer: COMMERCIAL

## 2025-05-12 DIAGNOSIS — R92.8 ABNORMAL MAMMOGRAM: ICD-10-CM

## 2025-05-12 PROCEDURE — 76642 ULTRASOUND BREAST LIMITED: CPT

## 2025-05-22 ENCOUNTER — OFFICE VISIT (OUTPATIENT)
Dept: FAMILY MEDICINE CLINIC | Facility: CLINIC | Age: 50
End: 2025-05-22

## 2025-05-22 VITALS
TEMPERATURE: 97.9 F | DIASTOLIC BLOOD PRESSURE: 75 MMHG | OXYGEN SATURATION: 100 % | SYSTOLIC BLOOD PRESSURE: 115 MMHG | HEART RATE: 66 BPM

## 2025-05-22 DIAGNOSIS — Z13.6 SCREENING FOR CARDIOVASCULAR CONDITION: ICD-10-CM

## 2025-05-22 DIAGNOSIS — R19.00 ABDOMINAL WALL BULGE: ICD-10-CM

## 2025-05-22 DIAGNOSIS — R19.00 ABDOMINAL SWELLING: Primary | ICD-10-CM

## 2025-05-22 NOTE — PROGRESS NOTES
Name: Ramya Yeung      : 1975      MRN: [id number]  Encounter Provider: Patricia Cormier MD  Encounter Date: 2025   Encounter department: [facility] [place]  :  Assessment & Plan  Abdominal swelling  49 yo female w/ pmhx of hysterectomy and cholecystectomy with progressively worsening lower abdominal swelling and pressure exacerbated by exercise. No acute abdomen on exam. Differential at this time includes sports hernia, abdominal wall seroma, IBS. Will order CT of abdomen and pelvis. Follow up after completion of imaging. If symptoms persist follow up with PCP. Discussed Return/ED parameters with patient.    Orders:    CT abdomen pelvis wo contrast; Future    Abdominal wall bulge  Patient with hx of multiple previous abdominal surgeries with LUQ bulge when bending and lifting items. Small abdominal wall defect palpated on exam, reproducible. Will order CT scan for further evaluation. Follow up after completion of imaging. If symptoms persist follow up with PCP. Discussed Return/ED parameters with patient.         Screening for cardiovascular condition    Orders:    Lipid panel; Future    Comprehensive metabolic panel; Future           History of Present Illness   Ms. Yeung presents to clinic due to concern for lower abdominal swelling as well as concern for left upper quadrant bulge.     Patient states since about January she began to notice progressively worsening swelling in her lower abdomen. She states that in the morning when she first wakes up the swelling is not there and her stomach area is flat. Patient states that after she works out with  or runs the swelling will begin and will last until she goes to sleep then be resolved in the AM. She states that the swelling is associated with a pressure sensation of discomfort. Denies changes in bowel habits or urinary symptoms. Patient reports she has noticed the swelling gets worse with abdominal  exercises or squats. Patient reports she has tried multiple changes to her diet without change in her symptoms such as cutting out alcohol and doing a soup cleanse. Denies bloating or gas pain. Reports laparoscopic hysterectomy with oophorectomy several years ago. States she has also had a small bowel obstruction two years ago that was medically managed. She states she initially noticed the intermittent lower abdominal swelling  after discharge from the hospital from the O. Patient denies nausea or emesis.     Patient also reports that when she bends down to  an item she notices a painful lump that pops out of her left upper quadrant near a previous surgical incision. She states she can reduce the bulge but it is painful and uncomfortable to do so. She saw general surgery who did not find evidence of a hernia and also completed an abdominal US which did not show evidence of hernia.           Review of Systems   Constitutional:  Negative for chills and fever.   Eyes:  Negative for visual disturbance.   Respiratory:  Negative for cough and shortness of breath.    Cardiovascular:  Negative for chest pain and palpitations.   Gastrointestinal:  Positive for abdominal distention and abdominal pain. Negative for constipation, nausea and vomiting.   Genitourinary:  Negative for difficulty urinating and dysuria.   Musculoskeletal:  Negative for arthralgias and myalgias.   Skin:  Negative for rash.   Allergic/Immunologic: Positive for food allergies.   Neurological:  Negative for dizziness and headaches.       Objective   /75 (BP Location: Right arm, Patient Position: Sitting, Cuff Size: Standard)   Pulse 66   Temp 97.9 °F (36.6 °C) (Temporal)   LMP 10/04/2020 (Approximate)   SpO2 100%      Physical Exam  Vitals and nursing note reviewed.   Constitutional:       General: She is not in acute distress.     Appearance: Normal appearance. She is well-developed. She is not ill-appearing.   HENT:      Head:  Normocephalic and atraumatic.     Cardiovascular:      Rate and Rhythm: Normal rate and regular rhythm.      Heart sounds: No murmur heard.  Pulmonary:      Effort: Pulmonary effort is normal. No respiratory distress.      Breath sounds: Normal breath sounds.   Abdominal:      General: There is distension (lower abdomen).      Palpations: Abdomen is soft.      Tenderness: There is abdominal tenderness (b/l lower quadrant).      Hernia: A hernia (left upper quadrant ~1cm defect to palpation) is present.     Musculoskeletal:      Cervical back: Neck supple.      Right lower leg: No edema.      Left lower leg: No edema.     Skin:     General: Skin is warm and dry.     Neurological:      Mental Status: She is alert and oriented to person, place, and time.

## 2025-06-02 ENCOUNTER — TELEPHONE (OUTPATIENT)
Dept: FAMILY MEDICINE CLINIC | Facility: CLINIC | Age: 50
End: 2025-06-02

## 2025-06-02 ENCOUNTER — HOSPITAL ENCOUNTER (OUTPATIENT)
Dept: RADIOLOGY | Facility: HOSPITAL | Age: 50
Discharge: HOME | End: 2025-06-02

## 2025-06-02 DIAGNOSIS — R19.00 ABDOMINAL SWELLING: ICD-10-CM

## 2025-06-02 NOTE — TELEPHONE ENCOUNTER
Patient called stating she went to her CT today and wasn't able to get it. They stated she needed an insurance approval for the imaging. She can be reached at [phone].

## 2025-06-03 ENCOUNTER — RESULTS FOLLOW-UP (OUTPATIENT)
Dept: OBGYN CLINIC | Facility: CLINIC | Age: 50
End: 2025-06-03

## 2025-06-06 ENCOUNTER — HOSPITAL ENCOUNTER (OUTPATIENT)
Dept: RADIOLOGY | Facility: HOSPITAL | Age: 50
Discharge: HOME | End: 2025-06-06
Payer: COMMERCIAL

## 2025-06-06 PROCEDURE — 74176 CT ABD & PELVIS W/O CONTRAST: CPT

## 2025-06-23 ENCOUNTER — APPOINTMENT (OUTPATIENT)
Dept: LAB | Facility: CLINIC | Age: 50
End: 2025-06-23
Payer: COMMERCIAL

## 2025-06-23 ENCOUNTER — RESULTS FOLLOW-UP (OUTPATIENT)
Dept: FAMILY MEDICINE CLINIC | Facility: CLINIC | Age: 50
End: 2025-06-23

## 2025-06-23 DIAGNOSIS — Z13.6 SCREENING FOR CARDIOVASCULAR CONDITION: ICD-10-CM

## 2025-06-23 LAB
ALBUMIN SERPL BCG-MCNC: 4.4 G/DL (ref 3.5–5)
ALP SERPL-CCNC: 53 U/L (ref 34–104)
ALT SERPL W P-5'-P-CCNC: 16 U/L (ref 7–52)
ANION GAP SERPL CALCULATED.3IONS-SCNC: 7 MMOL/L (ref 4–13)
AST SERPL W P-5'-P-CCNC: 19 U/L (ref 13–39)
BILIRUB SERPL-MCNC: 0.77 MG/DL (ref 0.2–1)
BUN SERPL-MCNC: 13 MG/DL (ref 5–25)
CALCIUM SERPL-MCNC: 9.5 MG/DL (ref 8.4–10.2)
CHLORIDE SERPL-SCNC: 104 MMOL/L (ref 96–108)
CHOLEST SERPL-MCNC: 228 MG/DL (ref ?–200)
CO2 SERPL-SCNC: 27 MMOL/L (ref 21–32)
CREAT SERPL-MCNC: 0.83 MG/DL (ref 0.6–1.3)
GFR SERPL CREATININE-BSD FRML MDRD: 81 ML/MIN/1.73SQ M
GLUCOSE P FAST SERPL-MCNC: 93 MG/DL (ref 65–99)
HDLC SERPL-MCNC: 89 MG/DL
LDLC SERPL CALC-MCNC: 121 MG/DL (ref 0–100)
NONHDLC SERPL-MCNC: 139 MG/DL
POTASSIUM SERPL-SCNC: 4.3 MMOL/L (ref 3.5–5.3)
PROT SERPL-MCNC: 6.8 G/DL (ref 6.4–8.4)
SODIUM SERPL-SCNC: 138 MMOL/L (ref 135–147)
TRIGL SERPL-MCNC: 88 MG/DL (ref ?–150)

## 2025-06-23 PROCEDURE — 36415 COLL VENOUS BLD VENIPUNCTURE: CPT

## 2025-06-23 PROCEDURE — 80061 LIPID PANEL: CPT

## 2025-06-23 PROCEDURE — 80053 COMPREHEN METABOLIC PANEL: CPT

## 2025-06-26 ENCOUNTER — OFFICE VISIT (OUTPATIENT)
Dept: FAMILY MEDICINE CLINIC | Facility: CLINIC | Age: 50
End: 2025-06-26

## 2025-06-26 VITALS
BODY MASS INDEX: 23.22 KG/M2 | RESPIRATION RATE: 20 BRPM | DIASTOLIC BLOOD PRESSURE: 74 MMHG | OXYGEN SATURATION: 99 % | WEIGHT: 136 LBS | TEMPERATURE: 98.7 F | HEIGHT: 64 IN | SYSTOLIC BLOOD PRESSURE: 110 MMHG | HEART RATE: 70 BPM

## 2025-06-26 DIAGNOSIS — Z00.00 ANNUAL PHYSICAL EXAM: Primary | ICD-10-CM

## 2025-06-26 PROCEDURE — 99396 PREV VISIT EST AGE 40-64: CPT | Performed by: FAMILY MEDICINE

## 2025-06-26 NOTE — PATIENT INSTRUCTIONS
"Patient Education     Routine physical for adults   The Basics   Written by the doctors and editors at [facility]   What is a physical? -- A physical is a routine visit, or \"check-up,\" with your doctor. You might also hear it called a \"wellness visit\" or \"preventive visit.\"  During each visit, the doctor will:   Ask about your physical and mental health   Ask about your habits, behaviors, and lifestyle   Do an exam   Give you vaccines if needed   Talk to you about any medicines you take   Give advice about your health   Answer your questions  Getting regular check-ups is an important part of taking care of your health. It can help your doctor find and treat any problems you have. But it's also important for preventing health problems.  A routine physical is different from a \"sick visit.\" A sick visit is when you see a doctor because of a health concern or problem. Since physicals are scheduled ahead of time, you can think about what you want to ask the doctor.  How often should I get a physical? -- It depends on your age and health. In general, for people age 21 years and older:   If you are younger than 50 years, you might be able to get a physical every 3 years.   If you are 50 years or older, your doctor might recommend a physical every year.  If you have an ongoing health condition, like diabetes or high blood pressure, your doctor will probably want to see you more often.  What happens during a physical? -- In general, each visit will include:   Physical exam - The doctor or nurse will check your height, weight, heart rate, and blood pressure. They will also look at your eyes and ears. They will ask about how you are feeling and whether you have any symptoms that bother you.   Medicines - It's a good idea to bring a list of all the medicines you take to each doctor visit. Your doctor will talk to you about your medicines and answer any questions. Tell them if you are having any side effects that bother you. You " "should also tell them if you are having trouble paying for any of your medicines.   Habits and behaviors - This includes:   Your diet   Your exercise habits   Whether you smoke, drink alcohol, or use drugs   Whether you are sexually active   Whether you feel safe at home  Your doctor will talk to you about things you can do to improve your health and lower your risk of health problems. They will also offer help and support. For example, if you want to quit smoking, they can give you advice and might prescribe medicines. If you want to improve your diet or get more physical activity, they can help you with this, too.   Lab tests, if needed - The tests you get will depend on your age and situation. For example, your doctor might want to check your:   Cholesterol   Blood sugar   Iron level   Vaccines - The recommended vaccines will depend on your age, health, and what vaccines you already had. Vaccines are very important because they can prevent certain serious or deadly infections.   Discussion of screening - \"Screening\" means checking for diseases or other health problems before they cause symptoms. Your doctor can recommend screening based on your age, risk, and preferences. This might include tests to check for:   Cancer, such as breast, prostate, cervical, ovarian, colorectal, prostate, lung, or skin cancer   Sexually transmitted infections, such as chlamydia and gonorrhea   Mental health conditions like depression and anxiety  Your doctor will talk to you about the different types of screening tests. They can help you decide which screenings to have. They can also explain what the results might mean.   Answering questions - The physical is a good time to ask the doctor or nurse questions about your health. If needed, they can refer you to other doctors or specialists, too.  Adults older than 65 years often need other care, too. As you get older, your doctor will talk to you about:   How to prevent falling at " home   Hearing or vision tests   Memory testing   How to take your medicines safely   Making sure that you have the help and support you need at home  All topics are updated as new evidence becomes available and our peer review process is complete.  This topic retrieved from [employer] on: May 02, 2024.  Topic 002108 Version 1.0  Release: 32.4.3 - C32.122  © 2024 UpToDate, Inc. and/or its affiliates. All rights reserved.  Consumer Information Use and Disclaimer   Disclaimer: This generalized information is a limited summary of diagnosis, treatment, and/or medication information. It is not meant to be comprehensive and should be used as a tool to help the user understand and/or assess potential diagnostic and treatment options. It does NOT include all information about conditions, treatments, medications, side effects, or risks that may apply to a specific patient. It is not intended to be medical advice or a substitute for the medical advice, diagnosis, or treatment of a health care provider based on the health care provider's examination and assessment of a patient's specific and unique circumstances. Patients must speak with a health care provider for complete information about their health, medical questions, and treatment options, including any risks or benefits regarding use of medications. This information does not endorse any treatments or medications as safe, effective, or approved for treating a specific patient. UpToDate, Inc. and its affiliates disclaim any warranty or liability relating to this information or the use thereof.The use of this information is governed by the Terms of Use, available at https://www.[employer]-taguwer.com/en/know/clinical-effectiveness-terms. 2024© UpToDate, Inc. and its affiliates and/or licensors. All rights reserved.  Copyright   © 2024 UpToDate, Inc. and/or its affiliates. All rights reserved.

## 2025-06-26 NOTE — PROGRESS NOTES
Adult Annual Physical  Name: Ramya Yeung      : 1975      MRN: [id number]  Encounter Provider: Patricia Cormier MD  Encounter Date: 2025   Encounter department: [facility] [place]    :  Assessment & Plan  Annual physical exam  Due for screening mammogram, scheduled for oct 2025. Had hysterectomy, cervical cancer screening not indicated. Up to date on immunizations. Reviewed CMP and lipid panel from 2025, results wnl.              Preventive Screenings:  - Diabetes Screening: screening up-to-date  - Cholesterol Screening: screening up-to-date   - Hepatitis C screening: screening up-to-date   - HIV screening: screening up-to-date   - Cervical cancer screening: screening not indicated   - Breast cancer screening: screening up-to-date   - Colon cancer screening: screening up-to-date   - Lung cancer screening: screening not indicated     Immunizations:  - Immunizations due: Prevnar 20 and Zoster (Shingrix)      Depression Screening and Follow-up Plan: Patient was screened for depression during today's encounter. They screened negative with a PHQ-2 score of 0.          History of Present Illness     Adult Annual Physical:  Patient presents for annual physical. Abdominal swelling has improved. Notices certain exercises will increase the swelling. .     Diet and Physical Activity:  - Diet/Nutrition: well balanced diet, limited junk food, consuming 3-5 servings of fruits/vegetables daily, adequate fiber intake and adequate whole grain intake.  - Exercise: vigorous cardiovascular exercise, strength training exercises, 3-4 times a week on average, 5-7 times a week on average and 30-60 minutes on average.    Depression Screening:  - PHQ-2 Score: 0    General Health:  - Sleep: sleeps well and 7-8 hours of sleep on average.  - Hearing: normal hearing right ear, normal hearing left ear and normal hearing bilateral ears.  - Vision: most recent eye exam > 1 year ago and  "wears glasses and contacts.  - Dental: no dental visits for > 1 year and brushes teeth twice daily.    /GYN Health:  - Follows with GYN: yes.   - Menopause: postmenopausal.   - History of STDs: no  - Contraception: hysterectomy.      Advanced Care Planning:  - Has an advanced directive?: no    - Has a durable medical POA?: no    Had hysterectomy   Review of Systems   Constitutional:  Negative for chills and fever.   Eyes:  Negative for visual disturbance.   Respiratory:  Negative for cough and shortness of breath.    Cardiovascular:  Negative for chest pain and palpitations.   Gastrointestinal:  Negative for constipation and nausea.   Genitourinary:  Negative for difficulty urinating and dysuria.   Musculoskeletal:  Negative for arthralgias and myalgias.   Skin:  Negative for rash.   Neurological:  Negative for dizziness and headaches.         Objective   /74   Pulse 70   Temp 98.7 °F (37.1 °C) (Temporal)   Resp 20   Ht 5' 3.5\" (1.613 m)   Wt 61.7 kg (136 lb)   LMP 10/04/2020 (Approximate)   SpO2 99%   BMI 23.71 kg/m²     Physical Exam  Vitals and nursing note reviewed.   Constitutional:       General: She is not in acute distress.     Appearance: Normal appearance. She is well-developed. She is not ill-appearing.   HENT:      Head: Normocephalic and atraumatic.     Eyes:      Conjunctiva/sclera: Conjunctivae normal.       Cardiovascular:      Rate and Rhythm: Normal rate and regular rhythm.      Heart sounds: No murmur heard.  Pulmonary:      Effort: Pulmonary effort is normal. No respiratory distress.      Breath sounds: Normal breath sounds.   Abdominal:      Palpations: Abdomen is soft.      Tenderness: There is no abdominal tenderness.     Musculoskeletal:         General: No swelling.      Cervical back: Neck supple.     Skin:     General: Skin is warm and dry.     Neurological:      Mental Status: She is alert and oriented to person, place, and time.     Psychiatric:         Mood and Affect: " Mood normal.

## (undated) DEVICE — TIP COVER ACCESSORY

## (undated) DEVICE — FENESTRATED BIPOLAR FORCEPS: Brand: ENDOWRIST

## (undated) DEVICE — PREMIUM DRY TRAY LF: Brand: MEDLINE INDUSTRIES, INC.

## (undated) DEVICE — SUT STRATAFIX SPIRAL 2-0 CT-1 30 CM SXPP1B410

## (undated) DEVICE — OCCLUDER COLPO-PNEUMO

## (undated) DEVICE — ENDOPATH PNEUMONEEDLE INSUFFLATION NEEDLES WITH LUER LOCK CONNECTORS 120MM: Brand: ENDOPATH

## (undated) DEVICE — LARGE NEEDLE DRIVER: Brand: ENDOWRIST

## (undated) DEVICE — SUT VICRYL 0 CT-1 27 IN J260H

## (undated) DEVICE — VESSEL SEALER EXTEND: Brand: ENDOWRIST

## (undated) DEVICE — INTENDED FOR TISSUE SEPARATION, AND OTHER PROCEDURES THAT REQUIRE A SHARP SURGICAL BLADE TO PUNCTURE OR CUT.: Brand: BARD-PARKER SAFETY BLADES SIZE 11, STERILE

## (undated) DEVICE — BLADELESS OBTURATOR: Brand: WECK VISTA

## (undated) DEVICE — TOWEL SURG XR DETECT GREEN STRL RFD

## (undated) DEVICE — CANNULA SEAL

## (undated) DEVICE — 40595 XL TRENDELENBURG POSITIONING KIT: Brand: 40595 XL TRENDELENBURG POSITIONING KIT

## (undated) DEVICE — NEEDLE 25G X 1 1/2

## (undated) DEVICE — 1820 FOAM BLOCK NEEDLE COUNTER: Brand: DEVON

## (undated) DEVICE — SPECIMEN TRAP: Brand: ARGYLE

## (undated) DEVICE — CHLORHEXIDINE 4PCT 4 OZ

## (undated) DEVICE — PROGRASP FORCEPS: Brand: ENDOWRIST

## (undated) DEVICE — SUT MONOCRYL 4-0 PS-2 27 IN Y426H

## (undated) DEVICE — SYRINGE 50ML LL

## (undated) DEVICE — HEAVY DUTY TABLE COVER: Brand: CONVERTORS

## (undated) DEVICE — PAD GROUNDING ADULT

## (undated) DEVICE — VISUALIZATION SYSTEM: Brand: CLEARIFY

## (undated) DEVICE — ADHESIVE SKIN HIGH VISCOSITY EXOFIN 1ML

## (undated) DEVICE — MONOPOLAR CURVED SCISSORS: Brand: ENDOWRIST

## (undated) DEVICE — KIT, BETHLEHEM ROBOTIC PROST: Brand: CARDINAL HEALTH

## (undated) DEVICE — GLOVE INDICATOR PI UNDERGLOVE SZ 6.5 BLUE

## (undated) DEVICE — CHLORAPREP HI-LITE 26ML ORANGE

## (undated) DEVICE — ASTOUND STANDARD SURGICAL GOWN, XL: Brand: CONVERTORS

## (undated) DEVICE — AIRSEAL TUBE SMOKE EVAC LUMENX3 FILTERED

## (undated) DEVICE — LIGHT HANDLE COVER SLEEVE DISP BLUE STELLAR

## (undated) DEVICE — DRAPE EQUIPMENT RF WAND

## (undated) DEVICE — UTERINE MANIPULATOR RUMI 6.7 X 8 CM

## (undated) DEVICE — VIAL DECANTER

## (undated) DEVICE — GLOVE PI ULTRA TOUCH SZ.6.5

## (undated) DEVICE — TRAY FOLEY 16FR URIMETER SILICONE SURESTEP

## (undated) DEVICE — SPONGE 4 X 4 XRAY 16 PLY STRL LF RFD